# Patient Record
Sex: FEMALE | Race: BLACK OR AFRICAN AMERICAN | NOT HISPANIC OR LATINO | Employment: PART TIME | URBAN - METROPOLITAN AREA
[De-identification: names, ages, dates, MRNs, and addresses within clinical notes are randomized per-mention and may not be internally consistent; named-entity substitution may affect disease eponyms.]

---

## 2017-01-02 ENCOUNTER — HOSPITAL ENCOUNTER (EMERGENCY)
Facility: MEDICAL CENTER | Age: 37
End: 2017-01-03
Attending: EMERGENCY MEDICINE

## 2017-01-02 ENCOUNTER — APPOINTMENT (OUTPATIENT)
Dept: RADIOLOGY | Facility: MEDICAL CENTER | Age: 37
End: 2017-01-02
Attending: EMERGENCY MEDICINE

## 2017-01-02 VITALS
SYSTOLIC BLOOD PRESSURE: 147 MMHG | OXYGEN SATURATION: 98 % | WEIGHT: 158.73 LBS | RESPIRATION RATE: 14 BRPM | TEMPERATURE: 98.2 F | HEART RATE: 103 BPM | DIASTOLIC BLOOD PRESSURE: 103 MMHG | BODY MASS INDEX: 31.16 KG/M2 | HEIGHT: 60 IN

## 2017-01-02 DIAGNOSIS — F41.9 ANXIETY: ICD-10-CM

## 2017-01-02 DIAGNOSIS — R10.9 ABDOMINAL PAIN, UNSPECIFIED LOCATION: ICD-10-CM

## 2017-01-02 LAB
ALBUMIN SERPL BCP-MCNC: 4.2 G/DL (ref 3.2–4.9)
ALBUMIN/GLOB SERPL: 1.1 G/DL
ALP SERPL-CCNC: 54 U/L (ref 30–99)
ALT SERPL-CCNC: 13 U/L (ref 2–50)
ANION GAP SERPL CALC-SCNC: 8 MMOL/L (ref 0–11.9)
APPEARANCE UR: CLEAR
AST SERPL-CCNC: 20 U/L (ref 12–45)
BASOPHILS # BLD AUTO: 0.6 % (ref 0–1.8)
BASOPHILS # BLD: 0.06 K/UL (ref 0–0.12)
BILIRUB SERPL-MCNC: 0.2 MG/DL (ref 0.1–1.5)
BUN SERPL-MCNC: 8 MG/DL (ref 8–22)
CALCIUM SERPL-MCNC: 9.9 MG/DL (ref 8.5–10.5)
CHLORIDE SERPL-SCNC: 105 MMOL/L (ref 96–112)
CO2 SERPL-SCNC: 23 MMOL/L (ref 20–33)
COLOR UR AUTO: YELLOW
CREAT SERPL-MCNC: 0.61 MG/DL (ref 0.5–1.4)
EOSINOPHIL # BLD AUTO: 0.08 K/UL (ref 0–0.51)
EOSINOPHIL NFR BLD: 0.8 % (ref 0–6.9)
ERYTHROCYTE [DISTWIDTH] IN BLOOD BY AUTOMATED COUNT: 42.7 FL (ref 35.9–50)
GFR SERPL CREATININE-BSD FRML MDRD: >60 ML/MIN/1.73 M 2
GLOBULIN SER CALC-MCNC: 3.7 G/DL (ref 1.9–3.5)
GLUCOSE SERPL-MCNC: 87 MG/DL (ref 65–99)
GLUCOSE UR QL STRIP.AUTO: NEGATIVE MG/DL
HCG UR QL: NEGATIVE
HCT VFR BLD AUTO: 39.6 % (ref 37–47)
HGB BLD-MCNC: 12.8 G/DL (ref 12–16)
IMM GRANULOCYTES # BLD AUTO: 0.02 K/UL (ref 0–0.11)
IMM GRANULOCYTES NFR BLD AUTO: 0.2 % (ref 0–0.9)
KETONES UR QL STRIP.AUTO: NEGATIVE MG/DL
LEUKOCYTE ESTERASE UR QL STRIP.AUTO: ABNORMAL
LIPASE SERPL-CCNC: 39 U/L (ref 11–82)
LYMPHOCYTES # BLD AUTO: 3.11 K/UL (ref 1–4.8)
LYMPHOCYTES NFR BLD: 30.5 % (ref 22–41)
MCH RBC QN AUTO: 26.7 PG (ref 27–33)
MCHC RBC AUTO-ENTMCNC: 32.3 G/DL (ref 33.6–35)
MCV RBC AUTO: 82.5 FL (ref 81.4–97.8)
MONOCYTES # BLD AUTO: 0.79 K/UL (ref 0–0.85)
MONOCYTES NFR BLD AUTO: 7.8 % (ref 0–13.4)
NEUTROPHILS # BLD AUTO: 6.13 K/UL (ref 2–7.15)
NEUTROPHILS NFR BLD: 60.1 % (ref 44–72)
NITRITE UR QL STRIP.AUTO: NEGATIVE
NRBC # BLD AUTO: 0 K/UL
NRBC BLD AUTO-RTO: 0 /100 WBC
PH UR STRIP.AUTO: 6 [PH]
PLATELET # BLD AUTO: 429 K/UL (ref 164–446)
PMV BLD AUTO: 10.1 FL (ref 9–12.9)
POTASSIUM SERPL-SCNC: 3.6 MMOL/L (ref 3.6–5.5)
PROT SERPL-MCNC: 7.9 G/DL (ref 6–8.2)
PROT UR QL STRIP: NEGATIVE MG/DL
RBC # BLD AUTO: 4.8 M/UL (ref 4.2–5.4)
RBC UR QL AUTO: ABNORMAL
SODIUM SERPL-SCNC: 136 MMOL/L (ref 135–145)
SP GR UR: 1.02
WBC # BLD AUTO: 10.2 K/UL (ref 4.8–10.8)

## 2017-01-02 PROCEDURE — 700117 HCHG RX CONTRAST REV CODE 255: Performed by: EMERGENCY MEDICINE

## 2017-01-02 PROCEDURE — 96375 TX/PRO/DX INJ NEW DRUG ADDON: CPT

## 2017-01-02 PROCEDURE — 85025 COMPLETE CBC W/AUTO DIFF WBC: CPT

## 2017-01-02 PROCEDURE — 700111 HCHG RX REV CODE 636 W/ 250 OVERRIDE (IP): Performed by: EMERGENCY MEDICINE

## 2017-01-02 PROCEDURE — 83690 ASSAY OF LIPASE: CPT

## 2017-01-02 PROCEDURE — 700105 HCHG RX REV CODE 258: Performed by: EMERGENCY MEDICINE

## 2017-01-02 PROCEDURE — 99285 EMERGENCY DEPT VISIT HI MDM: CPT

## 2017-01-02 PROCEDURE — 96374 THER/PROPH/DIAG INJ IV PUSH: CPT

## 2017-01-02 PROCEDURE — 74177 CT ABD & PELVIS W/CONTRAST: CPT

## 2017-01-02 PROCEDURE — 80053 COMPREHEN METABOLIC PANEL: CPT

## 2017-01-02 PROCEDURE — 81002 URINALYSIS NONAUTO W/O SCOPE: CPT

## 2017-01-02 PROCEDURE — 81025 URINE PREGNANCY TEST: CPT

## 2017-01-02 PROCEDURE — 96361 HYDRATE IV INFUSION ADD-ON: CPT

## 2017-01-02 RX ORDER — SODIUM CHLORIDE 9 MG/ML
1000 INJECTION, SOLUTION INTRAVENOUS ONCE
Status: COMPLETED | OUTPATIENT
Start: 2017-01-02 | End: 2017-01-03

## 2017-01-02 RX ORDER — MORPHINE SULFATE 4 MG/ML
4 INJECTION, SOLUTION INTRAMUSCULAR; INTRAVENOUS ONCE
Status: COMPLETED | OUTPATIENT
Start: 2017-01-02 | End: 2017-01-02

## 2017-01-02 RX ORDER — ONDANSETRON 4 MG/1
4 TABLET, ORALLY DISINTEGRATING ORAL EVERY 6 HOURS PRN
Qty: 20 TAB | Refills: 0 | Status: ON HOLD | OUTPATIENT
Start: 2017-01-02 | End: 2019-11-13

## 2017-01-02 RX ORDER — ONDANSETRON 2 MG/ML
4 INJECTION INTRAMUSCULAR; INTRAVENOUS ONCE
Status: COMPLETED | OUTPATIENT
Start: 2017-01-02 | End: 2017-01-02

## 2017-01-02 RX ORDER — MORPHINE SULFATE 4 MG/ML
4 INJECTION, SOLUTION INTRAMUSCULAR; INTRAVENOUS ONCE
Status: COMPLETED | OUTPATIENT
Start: 2017-01-03 | End: 2017-01-02

## 2017-01-02 RX ORDER — ONDANSETRON 2 MG/ML
4 INJECTION INTRAMUSCULAR; INTRAVENOUS ONCE
Status: DISCONTINUED | OUTPATIENT
Start: 2017-01-03 | End: 2017-01-03 | Stop reason: HOSPADM

## 2017-01-02 RX ADMIN — MORPHINE SULFATE 4 MG: 4 INJECTION INTRAVENOUS at 22:25

## 2017-01-02 RX ADMIN — MORPHINE SULFATE 4 MG: 4 INJECTION INTRAVENOUS at 23:50

## 2017-01-02 RX ADMIN — ONDANSETRON 4 MG: 2 INJECTION, SOLUTION INTRAMUSCULAR; INTRAVENOUS at 22:25

## 2017-01-02 RX ADMIN — SODIUM CHLORIDE 1000 ML: 9 INJECTION, SOLUTION INTRAVENOUS at 22:24

## 2017-01-02 RX ADMIN — IOHEXOL 100 ML: 350 INJECTION, SOLUTION INTRAVENOUS at 22:53

## 2017-01-02 ASSESSMENT — PAIN SCALES - GENERAL: PAINLEVEL_OUTOF10: 4

## 2017-01-02 NOTE — ED AVS SNAPSHOT
Caringo Access Code: [email or web address]  Expires: 1/5/2017  9:08 AM    Your email address is not on file at [facility].  Email Addresses are required for you to sign up for Caringo, please contact [phone] to verify your personal information and to provide your email address prior to attempting to register for Caringo.    Mi Gomez  [address]  [place] [address]    Caringo  A secure, online tool to manage your health information     [facility]’s Caringo® is a secure, online tool that connects you to your personalized health information from the privacy of your home -- day or night - making it very easy for you to manage your healthcare. Once the activation process is completed, you can even access your medical information using the Caringo sean, which is available for free in the Apple Sean store or Google Play store.     To learn more about Caringo, visit [email or web address]    There are two levels of access available (as shown below):   My Chart Features  [facility] Primary Care Doctor [facility]  Specialists [facility]  Urgent  Care Non-[facility] Primary Care Doctor   Email your healthcare team securely and privately 24/7 X X X    Manage appointments: schedule your next appointment; view details of past/upcoming appointments X      Request prescription refills. X      View recent personal medical records, including lab and immunizations X X X X   View health record, including health history, allergies, medications X X X X   Read reports about your outpatient visits, procedures, consult and ER notes X X X X   See your discharge summary, which is a recap of your hospital and/or ER visit that includes your diagnosis, lab results, and care plan X X  X     How to register for [email or web address]:  Once your e-mail address has been verified, follow the following steps to sign up for [email or web address].     1. Go to  [email or web address]  2. Click on the Sign Up Now box, which takes you to the New Member Sign Up  page. You will need to provide the following information:  a. Enter your StumbleUpon Access Code exactly as it appears at the top of this page. (You will not need to use this code after you’ve completed the sign-up process. If you do not sign up before the expiration date, you must request a new code.)   b. Enter your date of birth.   c. Enter your home email address.   d. Click Submit, and follow the next screen’s instructions.  3. Create a StumbleUpon ID. This will be your StumbleUpon login ID and cannot be changed, so think of one that is secure and easy to remember.  4. Create a StumbleUpon password. You can change your password at any time.  5. Enter your Password Reset Question and Answer. This can be used at a later time if you forget your password.   6. Enter your e-mail address. This allows you to receive e-mail notifications when new information is available in StumbleUpon.  7. Click Sign Up. You can now view your health information.    For assistance activating your StumbleUpon account, call [phone]

## 2017-01-02 NOTE — ED AVS SNAPSHOT
1/3/2017          Mi Gomez  [address]  [place] [address]    Dear Mi:    [facility] wants to ensure your discharge home is safe and you or your loved ones have had all your questions answered regarding your care after you leave the hospital.    You may receive a telephone call within two days of your discharge.  This call is to make certain you understand your discharge instructions as well as ensure we provided you with the best care possible during your stay with us.     The call will only last approximately 3-5 minutes and will be done by a nurse.    Once again, we want to ensure your discharge home is safe and that you have a clear understanding of any next steps in your care.  If you have any questions or concerns, please do not hesitate to contact us, we are here for you.  Thank you for choosing [facility] for your healthcare needs.    Sincerely,    Misael Mcpherson    [facility]

## 2017-01-02 NOTE — ED AVS SNAPSHOT
After Visit Summary                                                                                                                Mi Gomez   MRN: [id number]    Department:  [facility], Emergency Dept   Date of Visit:  1/2/2017            [facility], Emergency Dept    [address] [address]    Phone:  [phone]      You were seen by     Jean Claude Levine M.D.      Your Diagnosis Was     Abdominal pain, unspecified location     R10.9       These are the medications you received during your hospitalization from 01/02/2017 1542 to 01/03/2017 0034     Date/Time Order Dose Route Action    01/02/2017 2224 NS infusion 1,000 mL 1,000 mL Intravenous New Bag    01/02/2017 2225 morphine (pf) 4 mg/ml injection 4 mg 4 mg Intravenous Given    01/02/2017 2225 ondansetron (ZOFRAN) syringe/vial injection 4 mg 4 mg Intravenous Given    01/02/2017 2253 iohexol (OMNIPAQUE) 350 mg/mL 100 mL Intravenous Given    01/02/2017 2350 morphine (pf) 4 mg/ml injection 4 mg 4 mg Intravenous Given      Follow-up Information     1. Follow up with Raegan Wilcox M.D. In 2 days.    Specialty:  Internal Medicine    Contact information    [address]  [address] [address] [phone]        Medication Information     Review all of your home medications and newly ordered medications with your primary doctor and/or pharmacist as soon as possible. Follow medication instructions as directed by your doctor and/or pharmacist.     Please keep your complete medication list with you and share with your physician. Update the information when medications are discontinued, doses are changed, or new medications (including over-the-counter products) are added; and carry medication information at all times in the event of emergency situations.               Medication List      ASK your doctor about these medications        Instructions    aripiprazole 5 MG tablet   Commonly known  as:  ABILIFY    Take 1 Tab by mouth every day.   Dose:  5 mg       budesonide 3 MG Cpep capsule   Start taking on:  1/7/2017   Commonly known as:  ENTOCROT EC    Doctor's comments:  Start AFTER completion of prednisone taper.   Take 2 Caps by mouth every morning.   Dose:  6 mg       gabapentin 300 MG Caps   Commonly known as:  NEURONTIN    Take 1 Cap by mouth every day.   Dose:  300 mg       HUMIRA PEN 40 MG/0.8ML Pnkt   Generic drug:  Adalimumab        mesalamine extended-release 500 MG capsule   Commonly known as:  PENTASA    Take 1 Cap by mouth 4 times a day.   Dose:  500 mg       mirtazapine 15 MG Tabs   Commonly known as:  REMERON    Take 0.5 Tabs by mouth every bedtime.   Dose:  7.5 mg       * ondansetron 4 MG Tbdp   What changed:  Another medication with the same name was added. Make sure you understand how and when to take each.   Commonly known as:  ZOFRAN ODT   Ask about: Which instructions should I use?    Take 1 Tab by mouth 2 times a day as needed for Nausea/Vomiting.   Dose:  4 mg       * ondansetron 4 MG Tbdp   What changed:  You were already taking a medication with the same name, and this prescription was added. Make sure you understand how and when to take each.   Commonly known as:  ZOFRAN ODT   Ask about: Which instructions should I use?    Take 1 Tab by mouth every 6 hours as needed for Nausea/Vomiting.   Dose:  4 mg       oxycodone-acetaminophen 5-325 MG Tabs   Commonly known as:  PERCOCET    Take 1 Tab by mouth 3 times a day as needed.   Dose:  1 Tab       predniSONE 10 MG Tabs   Commonly known as:  DELTASONE    Take 4 tabs daily for one week (12/2 - 12/9)  Then take 3 tabs daily for one week (12/10 - 12/16)  Then take 2 tabs daily for one week (12/17 - 12/23)  Then take 1 tab daily for one week (12/24 - 12/30)  Then take 0.5 tabs daily for one week (12/31 - 1/6 Follow this until seen by your GI doctor       sertraline 50 MG Tabs   Commonly known as:  ZOLOFT    Take 1.5 Tabs by mouth every  day.   Dose:  75 mg       * Notice:  This list has 2 medication(s) that are the same as other medications prescribed for you. Read the directions carefully, and ask your doctor or other care provider to review them with you.            Procedures and tests performed during your visit     CBC WITH DIFFERENTIAL    COMP METABOLIC PANEL    CONSENT FOR CONTRAST INJECTION    CT-ABDOMEN-PELVIS WITH    ESTIMATED GFR    LIPASE    POC UA    POC URINE PREGNANCY    SALINE LOCK        Discharge Instructions       Abdominal Pain (Nonspecific)  Your exam might not show the exact reason you have abdominal pain. Since there are many different causes of abdominal pain, another checkup and more tests may be needed. It is very important to follow up for lasting (persistent) or worsening symptoms. A possible cause of abdominal pain in any person who still has his or her appendix is acute appendicitis. Appendicitis is often hard to diagnose. Normal blood tests, urine tests, ultrasound, and CT scans do not completely rule out early appendicitis or other causes of abdominal pain. Sometimes, only the changes that happen over time will allow appendicitis and other causes of abdominal pain to be determined. Other potential problems that may require surgery may also take time to become more apparent. Because of this, it is important that you follow all of the instructions below.  HOME CARE INSTRUCTIONS   · Rest as much as possible.   · Do not eat solid food until your pain is gone.   · While adults or children have pain: A diet of water, weak decaffeinated tea, broth or bouillon, gelatin, oral rehydration solutions (ORS), frozen ice pops, or ice chips may be helpful.   · When pain is gone in adults or children: Start a light diet (dry toast, crackers, applesauce, or white rice). Increase the diet slowly as long as it does not bother you. Eat no dairy products (including cheese and eggs) and no spicy, fatty, fried, or high-fiber foods.   · Use  no alcohol, caffeine, or cigarettes.   · Take your regular medicines unless your caregiver told you not to.   · Take any prescribed medicine as directed.   · Only take over-the-counter or prescription medicines for pain, discomfort, or fever as directed by your caregiver. Do not give aspirin to children.   If your caregiver has given you a follow-up appointment, it is very important to keep that appointment. Not keeping the appointment could result in a permanent injury and/or lasting (chronic) pain and/or disability. If there is any problem keeping the appointment, you must call to reschedule.   SEEK IMMEDIATE MEDICAL CARE IF:   · Your pain is not gone in 24 hours.   · Your pain becomes worse, changes location, or feels different.   · You or your child has an oral temperature above 102° F (38.9° C), not controlled by medicine.   · Your baby is older than 3 months with a rectal temperature of 102° F (38.9° C) or higher.   · Your baby is 3 months old or younger with a rectal temperature of 100.4° F (38° C) or higher.   · You have shaking chills.   · You keep throwing up (vomiting) or cannot drink liquids.   · There is blood in your vomit or you see blood in your bowel movements.   · Your bowel movements become dark or black.   · You have frequent bowel movements.   · Your bowel movements stop (become blocked) or you cannot pass gas.   · You have bloody, frequent, or painful urination.   · You have yellow discoloration in the skin or whites of the eyes.   · Your stomach becomes bloated or bigger.   · You have dizziness or fainting.   · You have chest or back pain.   MAKE SURE YOU:   · Understand these instructions.   · Will watch your condition.   · Will get help right away if you are not doing well or get worse.   Document Released: 12/18/2006 Document Revised: 03/11/2013 Document Reviewed: 11/15/2010  ExitCare® Patient Information ©2013 [employer].          Patient Information     Patient Information    Following  emergency treatment: all patient requiring follow-up care must return either to a private physician or a clinic if your condition worsens before you are able to obtain further medical attention, please return to the emergency room.     Billing Information    [facility] we work to make the billing process streamlined for our patients.  Our Representatives are here to answer any questions you may have regarding your hospital bill.  If you have insurance coverage and have supplied your insurance information to us, we will submit a claim to your insurer on your behalf.  Should you have any questions regarding your bill, we can be reached online or by phone as follows:  Online: You are able pay your bills online or live chat with our representatives about any billing questions you may have. We are here to help Monday - Friday from 8:00am to 7:30pm and 9:00am - 12:00pm on Saturdays.  Please visit [email or web address]  for more information.   Phone:  [phone] or [phone]    Please note that your emergency physician, surgeon, pathologist, radiologist, anesthesiologist, and other specialists are not employed by [facility] and will therefore bill separately for their services.  Please contact them directly for any questions concerning their bills at the numbers below:     Emergency Physician Services:  [phone]  [facility]:  [phone]  [facility]:  [phone]  [facility]:  [phone]    1. Your final bill may vary from the amount quoted upon discharge if all procedures are not complete at that time, or if your doctor has additional procedures of which we are not aware. You will receive an additional bill if you return to the [facility] Department at [facility] for suture removal regardless of the facility of which the sutures were placed.     2. Please arrange for settlement of this account at the emergency  registration.    3. All self-pay accounts are due in full at the time of treatment.  If you are unable to meet this obligation then payment is expected within 4-5 days.     4. If you have had radiology studies (CT, X-ray, Ultrasound, MRI), you have received a preliminary result during your emergency department visit. Please contact the radiology department [phone] to receive a copy of your final result. Please discuss the Final result with your primary physician or with the follow up physician provided.     Crisis Hotline:  [place] Crisis Hotline:  8-344-VRMBULN or 1-471.106.6165  [place] Crisis Hotline:    1-340.646.6312 or 914-390-5526         ED Discharge Follow Up Questions    1. In order to provide you with very good care, we would like to follow up with a phone call in the next few days.  May we have your permission to contact you?     YES /  NO    2. What is the best phone number to call you? (       )_____-__________    3. What is the best time to call you?      Morning  /  Afternoon  /  Evening                   Patient Signature:  ____________________________________________________________    Date:  ____________________________________________________________      Your appointments     Jan 19, 2017  3:00 PM   Individual Therapy with YUE Kee   [facility]    [address]  [address]  [facility] [id number] [phone]            Jan 23, 2017  9:45 AM   Follow Up Med Management with BONNIE Morris   [facility]    [address]  [address]  [facility] [id number] [phone]

## 2017-01-03 NOTE — ED NOTES
Mi Calderónmarilynndariela discharged via ambulation with self, mother picking up in lobby.  Discharge instructions given and reviewed, patient educated to follow up with PCP, verbalized understanding.  Prescriptions given x 1.  All personal belongings in possession.  No questions at this time.

## 2017-01-03 NOTE — ED PROVIDER NOTES
ER PROVIDER NOTE    Scribed for Jean Claude Levine M.D.  by Elian Morris. 1/2/2017 at 8:10 PM.    Primary Care Provider: Raegan Wilcox M.D.  Means of Arrival: Walk-in   History obtained from: Patient   History limited by: None     CHIEF COMPLAINT  Chief Complaint   Patient presents with   • Crohn's Disease   • Nausea/Vomiting/Diarrhea   • Abdominal Pain       HPI  Mi Gomez is a 36 y.o. female who presents to the emergency department complaining of nausea, vomiting, diarrhea, and abdominal pain. Patient reports she has a history of Crohn's disease and has not been taking her Humira for two months due to insurance issues. She states she developed abdominal pain 12/29/16 and then developed diarrhea two days ago. Patient began vomiting last night. She states she is unsure of any hematochezia. Patient says her symptoms are consistent with a flare-up of Crohn's disease. She denies fever, chills, rash, chest pain, or other symptoms. No blood in her stool    REVIEW OF SYSTEMS  Pertinent positives include nausea, vomiting, diarrhea, abdominal pain. Pertinent negatives include no fever, chills, rash, chest pain, hematochezia (unsure). See HPI for details. All other systems reviewed and are negative.    PAST MEDICAL HISTORY   has a past medical history of PNA (pneumonia); IBD (inflammatory bowel disease); Depression; Anxiety; and Hepatitis C.    SURGICAL HISTORY   has past surgical history that includes colonoscopy with biopsy (N/A, 6/21/2016).    FAMILY HISTORY  Family History   Problem Relation Age of Onset   • Thyroid Mother    • Diabetes Maternal Grandmother    • Bipolar disorder Father      father possibly bipolar       SOCIAL HISTORY  Social History     Social History   • Marital Status: Single     Spouse Name: N/A   • Number of Children: N/A   • Years of Education: N/A     Social History Main Topics   • Smoking status: Never Smoker    • Smokeless tobacco: Not on file   • Alcohol Use: No       Comment: last use 7/17/2016; was daily   • Drug Use: No   • Sexual Activity: No     Other Topics Concern   • Not on file     Social History Narrative      History   Drug Use No       CURRENT MEDICATIONS  Reviewed.  See Encounter Summary.     ALLERGIES  Allergies   Allergen Reactions   • Phenergan [Promethazine]      Anxiety     • Seroquel [Quetiapine] Itching   • Trazodone Itching       PHYSICAL EXAM  VITAL SIGNS: /103 mmHg  Pulse 103  Temp(Src) 36.8 °C (98.2 °F) (Temporal)  Resp 14  Ht 1.524 m (5')  Wt 72 kg (158 lb 11.7 oz)  BMI 31.00 kg/m2  SpO2 98%  Pulse ox interpretation: I interpret this pulse ox as normal.    Constitutional: Alert in no apparent distress.  HENT: No signs of trauma, Bilateral external ears normal, Nose normal.   Eyes: Pupils are equal and reactive, Conjunctiva normal, Non-icteric.   Neck: Normal range of motion, No tenderness, Supple, No stridor.   Cardiovascular: Regular rate and rhythm, no murmurs.   Thorax & Lungs: Normal breath sounds, No respiratory distress, No wheezing, No chest tenderness.   Abdomen: mild Left lower quadrant tenderness. Bowel sounds normal, Soft, No masses, No pulsatile masses. No peritoneal signs.  Skin: Warm, Dry, No erythema, No rash.   Back: No bony tenderness, No CVA tenderness.   Extremities: Intact distal pulses, No edema, No tenderness, No cyanosis  Musculoskeletal: Good range of motion in all major joints. No tenderness to palpation or major deformities noted.   Neurologic: Alert , Normal motor function, Normal sensory function, No focal deficits noted.   Psychiatric: Affect normal, Judgment normal, Mood normal.     Filed Vitals:    01/02/17 1543 01/02/17 1624   BP: 147/103    Pulse: 103    Temp: 36.8 °C (98.2 °F)    TempSrc: Temporal    Resp: 14    Height: 1.524 m (5')    Weight:  72 kg (158 lb 11.7 oz)   SpO2: 98%          DIAGNOSTIC STUDIES / PROCEDURES    LABS  Results for orders placed or performed during the hospital encounter of 01/02/17    CBC WITH DIFFERENTIAL   Result Value Ref Range    WBC 10.2 4.8 - 10.8 K/uL    RBC 4.80 4.20 - 5.40 M/uL    Hemoglobin 12.8 12.0 - 16.0 g/dL    Hematocrit 39.6 37.0 - 47.0 %    MCV 82.5 81.4 - 97.8 fL    MCH 26.7 (L) 27.0 - 33.0 pg    MCHC 32.3 (L) 33.6 - 35.0 g/dL    RDW 42.7 35.9 - 50.0 fL    Platelet Count 429 164 - 446 K/uL    MPV 10.1 9.0 - 12.9 fL    Neutrophils-Polys 60.10 44.00 - 72.00 %    Lymphocytes 30.50 22.00 - 41.00 %    Monocytes 7.80 0.00 - 13.40 %    Eosinophils 0.80 0.00 - 6.90 %    Basophils 0.60 0.00 - 1.80 %    Immature Granulocytes 0.20 0.00 - 0.90 %    Nucleated RBC 0.00 /100 WBC    Neutrophils (Absolute) 6.13 2.00 - 7.15 K/uL    Lymphs (Absolute) 3.11 1.00 - 4.80 K/uL    Monos (Absolute) 0.79 0.00 - 0.85 K/uL    Eos (Absolute) 0.08 0.00 - 0.51 K/uL    Baso (Absolute) 0.06 0.00 - 0.12 K/uL    Immature Granulocytes (abs) 0.02 0.00 - 0.11 K/uL    NRBC (Absolute) 0.00 K/uL   COMP METABOLIC PANEL   Result Value Ref Range    Sodium 136 135 - 145 mmol/L    Potassium 3.6 3.6 - 5.5 mmol/L    Chloride 105 96 - 112 mmol/L    Co2 23 20 - 33 mmol/L    Anion Gap 8.0 0.0 - 11.9    Glucose 87 65 - 99 mg/dL    Bun 8 8 - 22 mg/dL    Creatinine 0.61 0.50 - 1.40 mg/dL    Calcium 9.9 8.5 - 10.5 mg/dL    AST(SGOT) 20 12 - 45 U/L    ALT(SGPT) 13 2 - 50 U/L    Alkaline Phosphatase 54 30 - 99 U/L    Total Bilirubin 0.2 0.1 - 1.5 mg/dL    Albumin 4.2 3.2 - 4.9 g/dL    Total Protein 7.9 6.0 - 8.2 g/dL    Globulin 3.7 (H) 1.9 - 3.5 g/dL    A-G Ratio 1.1 g/dL   LIPASE   Result Value Ref Range    Lipase 39 11 - 82 U/L   ESTIMATED GFR   Result Value Ref Range    GFR If African American >60 >60 mL/min/1.73 m 2    GFR If Non African American >60 >60 mL/min/1.73 m 2   POC UA   Result Value Ref Range    POC Color Yellow     POC Appearance Clear     POC Glucose Negative Negative mg/dL    POC Ketones Negative Negative mg/dL    POC Specific Gravity 1.020 1.005-1.030    POC Blood Moderate (A) Negative    POC Urine PH 6.0 5.0-8.0     POC Protein Negative Negative mg/dL    POC Nitrites Negative Negative    POC Leukocyte Esterase Large (A) Negative   POC URINE PREGNANCY   Result Value Ref Range    POC Urine Pregnancy Test Negative Negative      All labs reviewed by me.    RADIOLOGY  CT-ABDOMEN-PELVIS WITH   Final Result      1.  No acute intra-abdominal findings.      2.  Patchy right middle and lower lobe opacities likely represent atelectasis.        The radiologist's interpretation of all radiological studies have been reviewed by me.    COURSE & MEDICAL DECISION MAKING  Nursing notes, VS, PMSFHx reviewed in chart.    8:10 PM Patient seen and examined at bedside. I explained to the patient I will order labs and a CT scan of her abdomen to evaluate, and that her symptoms will be treated. Patient will be treated with Morphine 4 mg IV and Zofran 4 mg IV. The patient will be resuscitated with 1L NS IV. Ordered for CT-abdomen, eGFR, POC urine pregnancy, CBC, CMP, lipase, POC urinalysis to evaluate her symptoms.     9:02 PM Recheck: Patient is resting comfortably. Nurses are establishing the patient's IV.     11:47 PM Recheck: Patient is resting comfortably and reports feeling improved, abdomen is soft and nontender. I updated her on her results, which are outlined above. I explained that she is now stable for discharge with a prescription for Zofran to control her symptoms, but that she will need to follow up with her regular doctors for her medication for her Crohn's. She was made aware she will be discharged when her bag of fluids finishes. I advised her to return to the ED for worsening symptoms or any other medical concerns. She understands and will comply.      Decision Making:  This is a 36 y.o. female with history of Crohn's disease presenting with some abdominal pain. Likely a flare of her Crohn's, she actually has follow-up scheduled this week, will provide her with Zofran for symptom control as she has pain medications at home. CT  demonstrates no abscess, perforation, obstruction or other surgical intra-abdominal finding. She is overall quite well-appearing, with appropriate vital signs and no evidence of infectious process    The patient will return for new or worsening symptoms and is stable at the time of discharge.    The patient is referred to a primary physician for blood pressure management, diabetic screening, and for all other preventative health concerns.    DISPOSITION:  Patient will be discharged home in stable condition.    FOLLOW UP:  Raegan Wilcox M.D.  [address] [address]  [phone]    In 2 days        OUTPATIENT MEDICATIONS:  Discharge Medication List as of 1/3/2017 12:34 AM      START taking these medications    Details   !! ondansetron (ZOFRAN ODT) 4 MG TABLET DISPERSIBLE Take 1 Tab by mouth every 6 hours as needed for Nausea/Vomiting., Disp-20 Tab, R-0, Print Rx Paper       !! - Potential duplicate medications found. Please discuss with provider.          FINAL IMPRESSION  1. Abdominal pain, unspecified location    2. Anxiety         Elian BROWN ([employer]), am scribing for, and in the presence of, Jean Claude Levine M.D..    Electronically signed by: Elian Morris ([employer]), 1/2/2017    Jean Claude BROWN M.D. personally performed the services described in this documentation, as scribed by Elian Morris in my presence, and it is both accurate and complete.     The note accurately reflects work and decisions made by me.  Jean Claude Levine  1/3/2017  1:52 AM

## 2017-01-03 NOTE — DISCHARGE INSTRUCTIONS
Abdominal Pain (Nonspecific)  Your exam might not show the exact reason you have abdominal pain. Since there are many different causes of abdominal pain, another checkup and more tests may be needed. It is very important to follow up for lasting (persistent) or worsening symptoms. A possible cause of abdominal pain in any person who still has his or her appendix is acute appendicitis. Appendicitis is often hard to diagnose. Normal blood tests, urine tests, ultrasound, and CT scans do not completely rule out early appendicitis or other causes of abdominal pain. Sometimes, only the changes that happen over time will allow appendicitis and other causes of abdominal pain to be determined. Other potential problems that may require surgery may also take time to become more apparent. Because of this, it is important that you follow all of the instructions below.  HOME CARE INSTRUCTIONS   · Rest as much as possible.   · Do not eat solid food until your pain is gone.   · While adults or children have pain: A diet of water, weak decaffeinated tea, broth or bouillon, gelatin, oral rehydration solutions (ORS), frozen ice pops, or ice chips may be helpful.   · When pain is gone in adults or children: Start a light diet (dry toast, crackers, applesauce, or white rice). Increase the diet slowly as long as it does not bother you. Eat no dairy products (including cheese and eggs) and no spicy, fatty, fried, or high-fiber foods.   · Use no alcohol, caffeine, or cigarettes.   · Take your regular medicines unless your caregiver told you not to.   · Take any prescribed medicine as directed.   · Only take over-the-counter or prescription medicines for pain, discomfort, or fever as directed by your caregiver. Do not give aspirin to children.   If your caregiver has given you a follow-up appointment, it is very important to keep that appointment. Not keeping the appointment could result in a permanent injury and/or lasting (chronic) pain  and/or disability. If there is any problem keeping the appointment, you must call to reschedule.   SEEK IMMEDIATE MEDICAL CARE IF:   · Your pain is not gone in 24 hours.   · Your pain becomes worse, changes location, or feels different.   · You or your child has an oral temperature above 102° F (38.9° C), not controlled by medicine.   · Your baby is older than 3 months with a rectal temperature of 102° F (38.9° C) or higher.   · Your baby is 3 months old or younger with a rectal temperature of 100.4° F (38° C) or higher.   · You have shaking chills.   · You keep throwing up (vomiting) or cannot drink liquids.   · There is blood in your vomit or you see blood in your bowel movements.   · Your bowel movements become dark or black.   · You have frequent bowel movements.   · Your bowel movements stop (become blocked) or you cannot pass gas.   · You have bloody, frequent, or painful urination.   · You have yellow discoloration in the skin or whites of the eyes.   · Your stomach becomes bloated or bigger.   · You have dizziness or fainting.   · You have chest or back pain.   MAKE SURE YOU:   · Understand these instructions.   · Will watch your condition.   · Will get help right away if you are not doing well or get worse.   Document Released: 12/18/2006 Document Revised: 03/11/2013 Document Reviewed: 11/15/2010  [employer]® Patient Information ©2013 [employer].

## 2017-01-03 NOTE — ED NOTES
Pt has HX of crohns. Has not had HUmira in 2 months.  C?O N/V/D and abdominal pain which started on 12/29/16 and has worsened since then. Patient says these symptoms are consistent with a Crohns flare up.

## 2017-01-16 ENCOUNTER — HOSPITAL ENCOUNTER (EMERGENCY)
Facility: MEDICAL CENTER | Age: 37
End: 2017-01-17
Attending: EMERGENCY MEDICINE

## 2017-01-16 DIAGNOSIS — K50.90 CROHN'S DISEASE WITHOUT COMPLICATION, UNSPECIFIED GASTROINTESTINAL TRACT LOCATION (HCC): ICD-10-CM

## 2017-01-16 DIAGNOSIS — R10.30 LOWER ABDOMINAL PAIN: Primary | ICD-10-CM

## 2017-01-16 LAB
BASOPHILS # BLD AUTO: 0.4 % (ref 0–1.8)
BASOPHILS # BLD: 0.06 K/UL (ref 0–0.12)
EOSINOPHIL # BLD AUTO: 0.21 K/UL (ref 0–0.51)
EOSINOPHIL NFR BLD: 1.4 % (ref 0–6.9)
ERYTHROCYTE [DISTWIDTH] IN BLOOD BY AUTOMATED COUNT: 43.9 FL (ref 35.9–50)
HCT VFR BLD AUTO: 40.3 % (ref 37–47)
HGB BLD-MCNC: 13.4 G/DL (ref 12–16)
IMM GRANULOCYTES # BLD AUTO: 0.1 K/UL (ref 0–0.11)
IMM GRANULOCYTES NFR BLD AUTO: 0.7 % (ref 0–0.9)
LYMPHOCYTES # BLD AUTO: 3.8 K/UL (ref 1–4.8)
LYMPHOCYTES NFR BLD: 25.1 % (ref 22–41)
MCH RBC QN AUTO: 27.5 PG (ref 27–33)
MCHC RBC AUTO-ENTMCNC: 33.3 G/DL (ref 33.6–35)
MCV RBC AUTO: 82.8 FL (ref 81.4–97.8)
MONOCYTES # BLD AUTO: 1.14 K/UL (ref 0–0.85)
MONOCYTES NFR BLD AUTO: 7.5 % (ref 0–13.4)
NEUTROPHILS # BLD AUTO: 9.8 K/UL (ref 2–7.15)
NEUTROPHILS NFR BLD: 64.9 % (ref 44–72)
NRBC # BLD AUTO: 0 K/UL
NRBC BLD AUTO-RTO: 0 /100 WBC
PLATELET # BLD AUTO: 383 K/UL (ref 164–446)
PMV BLD AUTO: 11.4 FL (ref 9–12.9)
RBC # BLD AUTO: 4.87 M/UL (ref 4.2–5.4)
WBC # BLD AUTO: 15.1 K/UL (ref 4.8–10.8)

## 2017-01-16 PROCEDURE — 80053 COMPREHEN METABOLIC PANEL: CPT

## 2017-01-16 PROCEDURE — 83690 ASSAY OF LIPASE: CPT

## 2017-01-16 PROCEDURE — 85025 COMPLETE CBC W/AUTO DIFF WBC: CPT

## 2017-01-16 PROCEDURE — 99285 EMERGENCY DEPT VISIT HI MDM: CPT

## 2017-01-16 RX ORDER — SODIUM CHLORIDE 9 MG/ML
1000 INJECTION, SOLUTION INTRAVENOUS ONCE
Status: COMPLETED | OUTPATIENT
Start: 2017-01-17 | End: 2017-01-17

## 2017-01-16 RX ORDER — METOCLOPRAMIDE HYDROCHLORIDE 5 MG/ML
10 INJECTION INTRAMUSCULAR; INTRAVENOUS ONCE
Status: COMPLETED | OUTPATIENT
Start: 2017-01-17 | End: 2017-01-17

## 2017-01-16 ASSESSMENT — PAIN SCALES - GENERAL: PAINLEVEL_OUTOF10: 4

## 2017-01-16 NOTE — ED AVS SNAPSHOT
After Visit Summary                                                                                                                Mi Gomez   MRN: [id number]    Department:  [facility], Emergency Dept   Date of Visit:  1/16/2017            [facility], Emergency Dept    [address]    [place] [address]    Phone:  [phone]      You were seen by     Jessica Reveles D.O.      Your Diagnosis Was     Lower abdominal pain     R10.30       These are the medications you received during your hospitalization from 01/16/2017 1543 to 01/17/2017 0206     Date/Time Order Dose Route Action    01/17/2017 0003 NS infusion 1,000 mL 1,000 mL Intravenous New Bag    01/17/2017 0006 HYDROmorphone (DILAUDID) injection 1 mg 1 mg Intravenous Given    01/17/2017 0003 metoclopramide (REGLAN) injection 10 mg 10 mg Intravenous Given      Follow-up Information     1. Follow up with Raegan Wilcox M.D. In 1 day.    Specialty:  Internal Medicine    Contact information    [address]  [facility] [address]  [place] [address] [phone]          2. Follow up with Jessica Guzmán M.D. In 1 day.    Specialty:  Gastroenterology    Contact information    [address]  [place] [address] [phone]        Medication Information     Review all of your home medications and newly ordered medications with your primary doctor and/or pharmacist as soon as possible. Follow medication instructions as directed by your doctor and/or pharmacist.     Please keep your complete medication list with you and share with your physician. Update the information when medications are discontinued, doses are changed, or new medications (including over-the-counter products) are added; and carry medication information at all times in the event of emergency situations.               Medication List      START taking these medications        Instructions    metoclopramide 10 MG Tabs   Commonly known as:  REGLAN      Take 1 Tab by mouth every 6 hours as needed.   Dose:  10 mg         ASK your doctor about these medications        Instructions    aripiprazole 5 MG tablet   Commonly known as:  ABILIFY    Take 1 Tab by mouth every day.   Dose:  5 mg       budesonide 3 MG Cpep capsule   Commonly known as:  ENTOCROT EC    Doctor's comments:  Start AFTER completion of prednisone taper.   Take 2 Caps by mouth every morning.   Dose:  6 mg       gabapentin 300 MG Caps   Commonly known as:  NEURONTIN    Take 1 Cap by mouth every day.   Dose:  300 mg       HUMIRA PEN 40 MG/0.8ML Pnkt   Generic drug:  Adalimumab        mesalamine extended-release 500 MG capsule   Commonly known as:  PENTASA    Take 1 Cap by mouth 4 times a day.   Dose:  500 mg       mirtazapine 15 MG Tabs   Commonly known as:  REMERON    Take 0.5 Tabs by mouth every bedtime.   Dose:  7.5 mg       * ondansetron 4 MG Tbdp   Commonly known as:  ZOFRAN ODT    Take 1 Tab by mouth 2 times a day as needed for Nausea/Vomiting.   Dose:  4 mg       * ondansetron 4 MG Tbdp   Commonly known as:  ZOFRAN ODT    Take 1 Tab by mouth every 6 hours as needed for Nausea/Vomiting.   Dose:  4 mg       oxycodone-acetaminophen 5-325 MG Tabs   Commonly known as:  PERCOCET    Take 1 Tab by mouth 3 times a day as needed.   Dose:  1 Tab       sertraline 50 MG Tabs   Commonly known as:  ZOLOFT    Take 1.5 Tabs by mouth every day.   Dose:  75 mg       * Notice:  This list has 2 medication(s) that are the same as other medications prescribed for you. Read the directions carefully, and ask your doctor or other care provider to review them with you.            Procedures and tests performed during your visit     CBC WITH DIFFERENTIAL    COMP METABOLIC PANEL    Cardiac Monitoring    ESTIMATED GFR    IV Saline Lock    LIPASE    POC UA    POC URINE PREGNANCY    Pulse Ox        Discharge Instructions       Follow up with primary care and GI this week for reevaluation for close blood pressure monitoring and  medication management.    Continue home medications as previously indicated.  Zofran reportedly ineffective, try Reglan every 6 hours as needed for nausea or vomiting.  Encourage oral fluid hydration, clear liquid diet to advance as tolerated in 1-2 days.    Return to the emergency department for persistent worsening abdominal pain, rectal bleeding, fever, vomiting or other new concerns.    Crohn Disease  Crohn disease is a long-lasting (chronic) disease that affects your gastrointestinal (GI) tract. It often causes irritation and swelling (inflammation) in your small intestine and the beginning of your large intestine. However, it can affect any part of your GI tract. Crohn disease is part of a group of illnesses that are known as inflammatory bowel disease (IBD).  Crohn disease may start slowly and get worse over time. Symptoms may come and go. They may also disappear for months or even years at a time (remission).  CAUSES  The exact cause of Crohn disease is not known. It may be a response that causes your body's defense system (immune system) to mistakenly attack healthy cells and tissues (autoimmune response). Your genes and your environment may also play a role.  RISK FACTORS  You may be at greater risk for Crohn disease if you:  · Have other family members with Crohn disease or another IBD.  · Use any tobacco products, including cigarettes, chewing tobacco, or electronic cigarettes.  · Are in your 20s.  · Have Eastern  ancestry.  SIGNS AND SYMPTOMS  The main signs and symptoms of Crohn disease involve your GI tract. These include:  · Diarrhea.  · Rectal bleeding.  · An urgent need to move your bowels.  · The feeling that you are not finished having a bowel movement.  · Abdominal pain or cramping.  · Constipation.  General signs and symptoms of Crohn disease may also include:  · Unexplained weight loss.  · Fatigue.  · Fever.  · Nausea.  · Loss of appetite.  · Joint pain  · Changes in vision.  · Red  bumps on your skin.  DIAGNOSIS  Your health care provider may suspect Crohn disease based on your symptoms and your medical history. Your health care provider will do a physical exam. You may need to see a health care provider who specializes in diseases of the digestive tract (gastroenterologist). You may also have tests to help your health care providers make a diagnosis. These may include:  · Blood tests.  · Stool sample tests.  · Imaging tests, such as X-rays and CT scans.  · Tests to examine the inside of your intestines using a long, flexible tube that has a light and a camera on the end (endoscopy or colonoscopy).  · A procedure to take tissue samples from inside your bowel (biopsy) to be examined under a microscope.  TREATMENT   There is no cure for Crohn disease. Treatment will focus on managing your symptoms. Crohn disease affects each person differently. Your treatment may include:  · Resting your bowels. Drinking only clear liquids or getting nutrition through an IV for a period of time gives your bowels a chance to heal because they are not passing stools.  · Medicines. These may be used alone or in combination (combination therapy). These may include antibiotic medicines. You may be given medicines that help to:  ¨ Reduce inflammation.  ¨ Control your immune system activity.  ¨ Fight infections.  ¨ Relieve cramps and prevent diarrhea.  ¨ Control your pain.  · Surgery. You may need surgery if:  ¨ Medicines and other treatments are no longer working.  ¨ You develop complications from severe Crohn disease.  ¨ A section of your intestine becomes so damaged that it needs to be removed.  HOME CARE INSTRUCTIONS  · Take medicines only as directed by your health care provider.  · If you were prescribed an antibiotic medicine, finish it all even if you start to feel better.  · Keep all follow-up visits as directed by your health care provider. This is important.  · Talk with your health care provider about  changing your diet. This may help your symptoms. Your health care provide may recommend changes, such as:  ¨ Drinking more fluids.  ¨ Avoiding milk and other foods that contain lactose.  ¨ Eating a low-fat diet.  ¨ Avoiding high-fiber foods, such as popcorn and nuts.  ¨ Avoiding carbonated beverages, such as soda.  ¨ Eating smaller meals more often rather than eating large meals.  ¨ Keeping a food diary to identify foods that make your symptoms better or worse.  · Do not use any tobacco products, including cigarettes, chewing tobacco, or electronic cigarettes. If you need help quitting, ask your health care provider.  · Limit alcohol intake to no more than 1 drink per day for nonpregnant women and 2 drinks per day for men. One drink equals 12 ounces of beer, 5 ounces of wine, or 1½ ounces of hard liquor.  · Exercise daily or as directed by your health care provider.  SEEK MEDICAL CARE IF:  · You have diarrhea, abdominal cramps, and other gastrointestinal problems that are present almost all of the time.  · Your symptoms do not improve with treatment.  · You continue to lose weight.  · You develop a rash or sores on your skin.  · You develop eye problems.  · You have a fever.    · Your symptoms get worse.  · You develop new symptoms.  SEEK IMMEDIATE MEDICAL CARE IF:  · You have bloody diarrhea.  · You develop severe abdominal pain.  · You cannot pass stools.     This information is not intended to replace advice given to you by your health care provider. Make sure you discuss any questions you have with your health care provider.     Document Released: 09/27/2006 Document Revised: 01/08/2016 Document Reviewed: 08/05/2015  Elsevier Interactive Patient Education ©2016 Elsevier Inc.            Patient Information     Patient Information    Following emergency treatment: all patient requiring follow-up care must return either to a private physician or a clinic if your condition worsens before you are able to obtain  further medical attention, please return to the emergency room.     Billing Information    At [facility], we work to make the billing process streamlined for our patients.  Our Representatives are here to answer any questions you may have regarding your hospital bill.  If you have insurance coverage and have supplied your insurance information to us, we will submit a claim to your insurer on your behalf.  Should you have any questions regarding your bill, we can be reached online or by phone as follows:  Online: You are able pay your bills online or live chat with our representatives about any billing questions you may have. We are here to help Monday - Friday from 8:00am to 7:30pm and 9:00am - 12:00pm on Saturdays.  Please visit [email or web address]  for more information.   Phone:  [phone] or [phone]    Please note that your emergency physician, surgeon, pathologist, radiologist, anesthesiologist, and other specialists are not employed by [facility] and will therefore bill separately for their services.  Please contact them directly for any questions concerning their bills at the numbers below:     Emergency Physician Services:  [phone]  [facility]:  [phone]  [facility]:  [phone]  [facility]:  [phone]    1. Your final bill may vary from the amount quoted upon discharge if all procedures are not complete at that time, or if your doctor has additional procedures of which we are not aware. You will receive an additional bill if you return to the Emergency Department at [facility] for suture removal regardless of the facility of which the sutures were placed.     2. Please arrange for settlement of this account at the emergency registration.    3. All self-pay accounts are due in full at the time of treatment.  If you are unable to meet this obligation then payment is expected within 4-5 days.      4. If you have had radiology studies (CT, X-ray, Ultrasound, MRI), you have received a preliminary result during your emergency department visit. Please contact the radiology department [phone] to receive a copy of your final result. Please discuss the Final result with your primary physician or with the follow up physician provided.     Crisis Hotline:  [place] Crisis Hotline:  3-228-EAXFMNY or 1-338.221.6828  [place] Crisis Hotline:    1-572.939.9250 or 476-925-0603         ED Discharge Follow Up Questions    1. In order to provide you with very good care, we would like to follow up with a phone call in the next few days.  May we have your permission to contact you?     YES /  NO    2. What is the best phone number to call you? (       )_____-__________    3. What is the best time to call you?      Morning  /  Afternoon  /  Evening                   Patient Signature:  ____________________________________________________________    Date:  ____________________________________________________________      Your appointments     Jan 19, 2017  3:00 PM   Individual Therapy with YUE Kee   [facility]    [address]  [address]  [facility] [id number] [phone]            Jan 23, 2017  9:45 AM   Follow Up Med Management with BONNIE Morris   [facility]    [address]  [address]  [facility] [id number] [phone]

## 2017-01-16 NOTE — ED AVS SNAPSHOT
1/17/2017          Mi Gomez  [address]  [place] [address]    Dear Mi:    [facility] wants to ensure your discharge home is safe and you or your loved ones have had all your questions answered regarding your care after you leave the hospital.    You may receive a telephone call within two days of your discharge.  This call is to make certain you understand your discharge instructions as well as ensure we provided you with the best care possible during your stay with us.     The call will only last approximately 3-5 minutes and will be done by a nurse.    Once again, we want to ensure your discharge home is safe and that you have a clear understanding of any next steps in your care.  If you have any questions or concerns, please do not hesitate to contact us, we are here for you.  Thank you for choosing [facility] for your healthcare needs.    Sincerely,    Misael Mcpherson    [facility]

## 2017-01-16 NOTE — ED AVS SNAPSHOT
Altacor Access Code: [email or web address]  Expires: 2/3/2017  9:24 AM    Your email address is not on file at [facility].  Email Addresses are required for you to sign up for Altacor, please contact [phone] to verify your personal information and to provide your email address prior to attempting to register for Altacor.    Mi Rodriguez Jason  [address]  [place] [address]    Altacor  A secure, online tool to manage your health information     [facility]’s Altacor® is a secure, online tool that connects you to your personalized health information from the privacy of your home -- day or night - making it very easy for you to manage your healthcare. Once the activation process is completed, you can even access your medical information using the Altacor sean, which is available for free in the Apple Sean store or Google Play store.     To learn more about Altacor, visit [email or web address]    There are two levels of access available (as shown below):   My Chart Features  [facility] Primary Care Doctor [facility]  Specialists [facility]  Urgent  Care Non-[facility] Primary Care Doctor   Email your healthcare team securely and privately 24/7 X X X    Manage appointments: schedule your next appointment; view details of past/upcoming appointments X      Request prescription refills. X      View recent personal medical records, including lab and immunizations X X X X   View health record, including health history, allergies, medications X X X X   Read reports about your outpatient visits, procedures, consult and ER notes X X X X   See your discharge summary, which is a recap of your hospital and/or ER visit that includes your diagnosis, lab results, and care plan X X  X     How to register for Kelwayt:  Once your e-mail address has been verified, follow the following steps to sign up for Kelwayt.     1. Go to  [email or web address]  2. Click on the Sign Up Now box, which takes you to the New Member Sign Up  page. You will need to provide the following information:  a. Enter your jaeyos Access Code exactly as it appears at the top of this page. (You will not need to use this code after you’ve completed the sign-up process. If you do not sign up before the expiration date, you must request a new code.)   b. Enter your date of birth.   c. Enter your home email address.   d. Click Submit, and follow the next screen’s instructions.  3. Create a jaeyos ID. This will be your jaeyos login ID and cannot be changed, so think of one that is secure and easy to remember.  4. Create a jaeyos password. You can change your password at any time.  5. Enter your Password Reset Question and Answer. This can be used at a later time if you forget your password.   6. Enter your e-mail address. This allows you to receive e-mail notifications when new information is available in jaeyos.  7. Click Sign Up. You can now view your health information.    For assistance activating your jaeyos account, call [phone]

## 2017-01-17 VITALS
WEIGHT: 155.2 LBS | HEART RATE: 80 BPM | BODY MASS INDEX: 30.47 KG/M2 | OXYGEN SATURATION: 99 % | TEMPERATURE: 98.6 F | RESPIRATION RATE: 16 BRPM | HEIGHT: 60 IN | SYSTOLIC BLOOD PRESSURE: 130 MMHG | DIASTOLIC BLOOD PRESSURE: 70 MMHG

## 2017-01-17 LAB
ALBUMIN SERPL BCP-MCNC: 4.6 G/DL (ref 3.2–4.9)
ALBUMIN/GLOB SERPL: 1 G/DL
ALP SERPL-CCNC: 71 U/L (ref 30–99)
ALT SERPL-CCNC: 14 U/L (ref 2–50)
ANION GAP SERPL CALC-SCNC: 8 MMOL/L (ref 0–11.9)
APPEARANCE UR: ABNORMAL
AST SERPL-CCNC: 23 U/L (ref 12–45)
BILIRUB SERPL-MCNC: 0.4 MG/DL (ref 0.1–1.5)
BUN SERPL-MCNC: 8 MG/DL (ref 8–22)
CALCIUM SERPL-MCNC: 10 MG/DL (ref 8.5–10.5)
CHLORIDE SERPL-SCNC: 104 MMOL/L (ref 96–112)
CO2 SERPL-SCNC: 23 MMOL/L (ref 20–33)
COLOR UR AUTO: YELLOW
CREAT SERPL-MCNC: 0.67 MG/DL (ref 0.5–1.4)
GFR SERPL CREATININE-BSD FRML MDRD: >60 ML/MIN/1.73 M 2
GLOBULIN SER CALC-MCNC: 4.4 G/DL (ref 1.9–3.5)
GLUCOSE SERPL-MCNC: 89 MG/DL (ref 65–99)
GLUCOSE UR QL STRIP.AUTO: NEGATIVE MG/DL
HCG UR QL: NEGATIVE
KETONES UR QL STRIP.AUTO: NEGATIVE MG/DL
LEUKOCYTE ESTERASE UR QL STRIP.AUTO: NEGATIVE
LIPASE SERPL-CCNC: 28 U/L (ref 11–82)
NITRITE UR QL STRIP.AUTO: NEGATIVE
PH UR STRIP.AUTO: 5.5 [PH]
POTASSIUM SERPL-SCNC: 3.7 MMOL/L (ref 3.6–5.5)
PROT SERPL-MCNC: 9 G/DL (ref 6–8.2)
PROT UR QL STRIP: ABNORMAL MG/DL
RBC UR QL AUTO: ABNORMAL
SODIUM SERPL-SCNC: 135 MMOL/L (ref 135–145)
SP GR UR: >=1.03

## 2017-01-17 PROCEDURE — 700105 HCHG RX REV CODE 258: Performed by: EMERGENCY MEDICINE

## 2017-01-17 PROCEDURE — 96375 TX/PRO/DX INJ NEW DRUG ADDON: CPT

## 2017-01-17 PROCEDURE — 36415 COLL VENOUS BLD VENIPUNCTURE: CPT

## 2017-01-17 PROCEDURE — 96361 HYDRATE IV INFUSION ADD-ON: CPT

## 2017-01-17 PROCEDURE — 96374 THER/PROPH/DIAG INJ IV PUSH: CPT

## 2017-01-17 PROCEDURE — 700111 HCHG RX REV CODE 636 W/ 250 OVERRIDE (IP): Performed by: EMERGENCY MEDICINE

## 2017-01-17 PROCEDURE — 81025 URINE PREGNANCY TEST: CPT

## 2017-01-17 PROCEDURE — 81002 URINALYSIS NONAUTO W/O SCOPE: CPT

## 2017-01-17 RX ORDER — METOCLOPRAMIDE 10 MG/1
10 TABLET ORAL EVERY 6 HOURS PRN
Qty: 20 TAB | Refills: 0 | Status: ON HOLD | OUTPATIENT
Start: 2017-01-17 | End: 2019-11-15

## 2017-01-17 RX ADMIN — SODIUM CHLORIDE 1000 ML: 9 INJECTION, SOLUTION INTRAVENOUS at 00:03

## 2017-01-17 RX ADMIN — METOCLOPRAMIDE 10 MG: 5 INJECTION, SOLUTION INTRAMUSCULAR; INTRAVENOUS at 00:03

## 2017-01-17 RX ADMIN — HYDROMORPHONE HYDROCHLORIDE 1 MG: 1 INJECTION, SOLUTION INTRAMUSCULAR; INTRAVENOUS; SUBCUTANEOUS at 00:06

## 2017-01-17 ASSESSMENT — PAIN SCALES - GENERAL: PAINLEVEL_OUTOF10: 4

## 2017-01-17 NOTE — ED NOTES
Patient to follow up with PCP in 1 day. Patient to get ride home from friend. Patient verbalized understanding of home medications and discharge instructions. Patient ambulatory upon discharge.

## 2017-01-17 NOTE — DISCHARGE PLANNING
Medical Social Work    Referral: Consult    Intervention: MSW received a call from ERP requesting MSW meet with pt regarding pt's difficulty in obtaining needed medication for her Chron's Disease.  MSW reviewed pt's chart and pt was admitted on 11/30/2016 due to same complaint.  During pt's visit MD's spoke with pt's GI doctor's office and they were working on obtaining insurance auth for pt's Humira medication; however, they reported that pt didn't follow up with paperwork and appointments.  MSW met with pt at bedside.  Pt states that she has been working with her GI doctor's office and her insurance company regarding her Humira; however, due to the holidays she reports it's been difficult to get a hold of anyone.  Pt was advised to continue to try or look at paying out of pocket.  Pt was also advised to utilize her PCP's office for flare ups to have another possible advocate for the request of Humira through her insurance company.  Pt states that she never thought to go to her PCP for flare ups and has been coming to the ER.  Pt has had two ER visits since her last admit.  Pt will follow up with her GI doctors office and her insurance company.  ERP and bedside RN updated.    Plan: SW will D/C home once medically cleared.

## 2017-01-17 NOTE — ED NOTES
Pt comes in complaining of lower abd pain, diarrhea, and vomiting for approx 2 days. Pt reporting hx of chrons.

## 2017-01-17 NOTE — ED PROVIDER NOTES
ED Provider Note    CHIEF COMPLAINT  Chief Complaint   Patient presents with   • Abdominal Pain   • Diarrhea       HPI  Mi Gomez is a 36 y.o. female who presents to the emergency department for abdominal pain, nausea, vomiting and diarrhea intermittent recurring in greatest over the last couple of weeks. Patient discusses history of Crohn's, poorly controlled on Remicade, started on Humira but is unable to continue maintenance dosing to determine insurance problem. Followed by GI, Dr. Guzmán. Patient states that she has been seen previously for for the same, she uses prednisone or Medrol Dosepak with temporary improvement. Zofran home as been ineffective. For the last 3-4 days she continues to not tolerate oral fluids, multiple episodes of vomiting and diarrhea. Patient describes dark stools, occasionally blood-tinged. Low abdominal cramping, intermittent, 5 out of 10. No fever or chills.No dysuria, hematuria or frequency.    REVIEW OF SYSTEMS  See HPI for further details. All other systems are negative.     PAST MEDICAL HISTORY   has a past medical history of PNA (pneumonia); IBD (inflammatory bowel disease); Depression; Anxiety; Hepatitis C; and Crohn's disease (CMS-HCC).    SOCIAL HISTORY  Social History     Social History Main Topics   • Smoking status: Never Smoker    • Smokeless tobacco: Not on file   • Alcohol Use: No      Comment: last use 7/17/2016; was daily   • Drug Use: No   • Sexual Activity: No       SURGICAL HISTORY   has past surgical history that includes colonoscopy with biopsy (N/A, 6/21/2016).    CURRENT MEDICATIONS  Home Medications     **Home medications have not yet been reviewed for this encounter**          ALLERGIES  Allergies   Allergen Reactions   • Phenergan [Promethazine]      Anxiety     • Seroquel [Quetiapine] Itching   • Trazodone Itching       PHYSICAL EXAM  VITAL SIGNS: /94 mmHg  Pulse 87  Temp(Src) 37 °C (98.6 °F)  Resp 16  Ht 1.524 m (5')  Wt 70.4 kg (155 lb  3.3 oz)  BMI 30.31 kg/m2  SpO2 96%  LMP 12/21/2016 (Exact Date)  Pulse ox interpretation: I interpret this pulse ox as normal.  Constitutional: Alert in no apparent distress.  HENT: Normocephalic, atraumatic. Bilateral external ears normal, Nose normal. Slightly dry mucous membranes.    Eyes: Pupils are equal and reactive, Conjunctiva normal.   Neck: Normal range of motion, Supple.  Lymphatic: No lymphadenopathy noted.   Cardiovascular: Regular rate and rhythm, no murmurs. Distal pulses intact.  No peripheral edema.  Thorax & Lungs: Normal breath sounds.  No wheezing/rales/ronchi. No increased work of breathing.  Abdomen: Soft, minimal tenderness to palpation in the low abdomen. No rebound, guarding or peritonitis.  Rectal: Normal external anus, no hemorrhoids. Normal DR E, trace brown stool. Guaiac negative.  Skin: Warm, Dry, No erythema, No rash.   Musculoskeletal: Good range of motion in all major joints.   Neurologic: Alert , no gross focal deficit noted.  Psychiatric: Affect normal, Judgment normal, Mood normal.       DIAGNOSTIC STUDIES / PROCEDURES    LABS  Results for orders placed or performed during the hospital encounter of 01/16/17   CBC WITH DIFFERENTIAL   Result Value Ref Range    WBC 15.1 (H) 4.8 - 10.8 K/uL    RBC 4.87 4.20 - 5.40 M/uL    Hemoglobin 13.4 12.0 - 16.0 g/dL    Hematocrit 40.3 37.0 - 47.0 %    MCV 82.8 81.4 - 97.8 fL    MCH 27.5 27.0 - 33.0 pg    MCHC 33.3 (L) 33.6 - 35.0 g/dL    RDW 43.9 35.9 - 50.0 fL    Platelet Count 383 164 - 446 K/uL    MPV 11.4 9.0 - 12.9 fL    Neutrophils-Polys 64.90 44.00 - 72.00 %    Lymphocytes 25.10 22.00 - 41.00 %    Monocytes 7.50 0.00 - 13.40 %    Eosinophils 1.40 0.00 - 6.90 %    Basophils 0.40 0.00 - 1.80 %    Immature Granulocytes 0.70 0.00 - 0.90 %    Nucleated RBC 0.00 /100 WBC    Neutrophils (Absolute) 9.80 (H) 2.00 - 7.15 K/uL    Lymphs (Absolute) 3.80 1.00 - 4.80 K/uL    Monos (Absolute) 1.14 (H) 0.00 - 0.85 K/uL    Eos (Absolute) 0.21 0.00 -  0.51 K/uL    Baso (Absolute) 0.06 0.00 - 0.12 K/uL    Immature Granulocytes (abs) 0.10 0.00 - 0.11 K/uL    NRBC (Absolute) 0.00 K/uL   COMP METABOLIC PANEL   Result Value Ref Range    Sodium 135 135 - 145 mmol/L    Potassium 3.7 3.6 - 5.5 mmol/L    Chloride 104 96 - 112 mmol/L    Co2 23 20 - 33 mmol/L    Anion Gap 8.0 0.0 - 11.9    Glucose 89 65 - 99 mg/dL    Bun 8 8 - 22 mg/dL    Creatinine 0.67 0.50 - 1.40 mg/dL    Calcium 10.0 8.5 - 10.5 mg/dL    AST(SGOT) 23 12 - 45 U/L    ALT(SGPT) 14 2 - 50 U/L    Alkaline Phosphatase 71 30 - 99 U/L    Total Bilirubin 0.4 0.1 - 1.5 mg/dL    Albumin 4.6 3.2 - 4.9 g/dL    Total Protein 9.0 (H) 6.0 - 8.2 g/dL    Globulin 4.4 (H) 1.9 - 3.5 g/dL    A-G Ratio 1.0 g/dL   LIPASE   Result Value Ref Range    Lipase 28 11 - 82 U/L   ESTIMATED GFR   Result Value Ref Range    GFR If African American >60 >60 mL/min/1.73 m 2    GFR If Non African American >60 >60 mL/min/1.73 m 2   POC UA   Result Value Ref Range    POC Color Yellow     POC Appearance Cloudy (A)     POC Glucose Negative Negative mg/dL    POC Ketones Negative Negative mg/dL    POC Specific Gravity >=1.030 (A) 1.005-1.030    POC Blood Moderate (A) Negative    POC Urine PH 5.5 5.0-8.0    POC Protein Trace (A) Negative mg/dL    POC Nitrites Negative Negative    POC Leukocyte Esterase Negative Negative   POC URINE PREGNANCY   Result Value Ref Range    POC Urine Pregnancy Test Negative Negative     COURSE & MEDICAL DECISION MAKING  Nursing notes and vital signs were reviewed. (See chart for details)  The patients  records were reviewed, history was obtained from the patient ;     Medical record review: ED evaluation for the same January 2, 2017. Labs normal, symptoms controlled with IV fluid and Zofran. Patient discharged home with Zofran.  Discussion with social work today, Chanel, patient admitted in December for similar symptoms, contact had been made with GI office, apparently patient had been tardy in follow-up and with filling  out paperwork. She needs to do so in order to receive her Humira. She'll be reminded of this conversation referred back to    Evaluation for abdominal pain, vomiting and diarrhea is unrevealing, symptomatology is consistent with a Crohn exacerbation. Mild nonspecific leukocytosis without left shift or bandemia is appropriate in this setting. Labs are otherwise unremarkable, no A derangement or dehydration. Pregnancy negative. Urinalysis within normal limits. Vital signs are stable without fever persistent tachycardia. Abdominal exam is benign without peritonitis or acute abdomen. Patient does have chronic recurring similar symptoms, she has been strongly encouraged to follow-up with her primary care as well as GI to ensure compliance with Humira as previously recommended.    Patient is stable for discharge at this time, anticipatory guidance provided, Reglan for nausea or vomiting, close follow-up is encouraged, and strict ED return instructions have been detailed. Patient is agreeable to the disposition and plan.    Patient's blood pressure was elevated in the emergency department, and has been referred to primary care for close monitoring.    FINAL IMPRESSION  (R10.30) Lower abdominal pain  (primary encounter diagnosis)  (K50.90) Crohn's disease without complication, unspecified gastrointestinal tract location (CMS-[facility])      Electronically signed by: Jessica Reveles, 1/16/2017 11:24 PM      This dictation was created using voice recognition software. The accuracy of the dictation is limited to the abilities of the software. I expect there may be some errors of grammar and possibly content. The nursing notes were reviewed and certain aspects of this information were incorporated into this note.

## 2017-01-17 NOTE — DISCHARGE INSTRUCTIONS
Follow up with primary care and GI this week for reevaluation for close blood pressure monitoring and medication management.    Continue home medications as previously indicated.  Zofran reportedly ineffective, try Reglan every 6 hours as needed for nausea or vomiting.  Encourage oral fluid hydration, clear liquid diet to advance as tolerated in 1-2 days.    Return to the emergency department for persistent worsening abdominal pain, rectal bleeding, fever, vomiting or other new concerns.    Crohn Disease  Crohn disease is a long-lasting (chronic) disease that affects your gastrointestinal (GI) tract. It often causes irritation and swelling (inflammation) in your small intestine and the beginning of your large intestine. However, it can affect any part of your GI tract. Crohn disease is part of a group of illnesses that are known as inflammatory bowel disease (IBD).  Crohn disease may start slowly and get worse over time. Symptoms may come and go. They may also disappear for months or even years at a time (remission).  CAUSES  The exact cause of Crohn disease is not known. It may be a response that causes your body's defense system (immune system) to mistakenly attack healthy cells and tissues (autoimmune response). Your genes and your environment may also play a role.  RISK FACTORS  You may be at greater risk for Crohn disease if you:  · Have other family members with Crohn disease or another IBD.  · Use any tobacco products, including cigarettes, chewing tobacco, or electronic cigarettes.  · Are in your 20s.  · Have Eastern  ancestry.  SIGNS AND SYMPTOMS  The main signs and symptoms of Crohn disease involve your GI tract. These include:  · Diarrhea.  · Rectal bleeding.  · An urgent need to move your bowels.  · The feeling that you are not finished having a bowel movement.  · Abdominal pain or cramping.  · Constipation.  General signs and symptoms of Crohn disease may also include:  · Unexplained weight  loss.  · Fatigue.  · Fever.  · Nausea.  · Loss of appetite.  · Joint pain  · Changes in vision.  · Red bumps on your skin.  DIAGNOSIS  Your health care provider may suspect Crohn disease based on your symptoms and your medical history. Your health care provider will do a physical exam. You may need to see a health care provider who specializes in diseases of the digestive tract (gastroenterologist). You may also have tests to help your health care providers make a diagnosis. These may include:  · Blood tests.  · Stool sample tests.  · Imaging tests, such as X-rays and CT scans.  · Tests to examine the inside of your intestines using a long, flexible tube that has a light and a camera on the end (endoscopy or colonoscopy).  · A procedure to take tissue samples from inside your bowel (biopsy) to be examined under a microscope.  TREATMENT   There is no cure for Crohn disease. Treatment will focus on managing your symptoms. Crohn disease affects each person differently. Your treatment may include:  · Resting your bowels. Drinking only clear liquids or getting nutrition through an IV for a period of time gives your bowels a chance to heal because they are not passing stools.  · Medicines. These may be used alone or in combination (combination therapy). These may include antibiotic medicines. You may be given medicines that help to:  ¨ Reduce inflammation.  ¨ Control your immune system activity.  ¨ Fight infections.  ¨ Relieve cramps and prevent diarrhea.  ¨ Control your pain.  · Surgery. You may need surgery if:  ¨ Medicines and other treatments are no longer working.  ¨ You develop complications from severe Crohn disease.  ¨ A section of your intestine becomes so damaged that it needs to be removed.  HOME CARE INSTRUCTIONS  · Take medicines only as directed by your health care provider.  · If you were prescribed an antibiotic medicine, finish it all even if you start to feel better.  · Keep all follow-up visits as  directed by your health care provider. This is important.  · Talk with your health care provider about changing your diet. This may help your symptoms. Your health care provide may recommend changes, such as:  ¨ Drinking more fluids.  ¨ Avoiding milk and other foods that contain lactose.  ¨ Eating a low-fat diet.  ¨ Avoiding high-fiber foods, such as popcorn and nuts.  ¨ Avoiding carbonated beverages, such as soda.  ¨ Eating smaller meals more often rather than eating large meals.  ¨ Keeping a food diary to identify foods that make your symptoms better or worse.  · Do not use any tobacco products, including cigarettes, chewing tobacco, or electronic cigarettes. If you need help quitting, ask your health care provider.  · Limit alcohol intake to no more than 1 drink per day for nonpregnant women and 2 drinks per day for men. One drink equals 12 ounces of beer, 5 ounces of wine, or 1½ ounces of hard liquor.  · Exercise daily or as directed by your health care provider.  SEEK MEDICAL CARE IF:  · You have diarrhea, abdominal cramps, and other gastrointestinal problems that are present almost all of the time.  · Your symptoms do not improve with treatment.  · You continue to lose weight.  · You develop a rash or sores on your skin.  · You develop eye problems.  · You have a fever.    · Your symptoms get worse.  · You develop new symptoms.  SEEK IMMEDIATE MEDICAL CARE IF:  · You have bloody diarrhea.  · You develop severe abdominal pain.  · You cannot pass stools.     This information is not intended to replace advice given to you by your health care provider. Make sure you discuss any questions you have with your health care provider.     Document Released: 09/27/2006 Document Revised: 01/08/2016 Document Reviewed: 08/05/2015  [employer] Interactive Patient Education ©2016 [employer]

## 2017-08-18 ENCOUNTER — DOCUMENTATION (OUTPATIENT)
Dept: BEHAVIORAL HEALTH | Facility: PHYSICIAN GROUP | Age: 37
End: 2017-08-18

## 2019-11-13 ENCOUNTER — HOSPITAL ENCOUNTER (INPATIENT)
Facility: MEDICAL CENTER | Age: 39
LOS: 2 days | DRG: 386 | End: 2019-11-15
Attending: EMERGENCY MEDICINE | Admitting: HOSPITALIST

## 2019-11-13 ENCOUNTER — APPOINTMENT (OUTPATIENT)
Dept: RADIOLOGY | Facility: MEDICAL CENTER | Age: 39
DRG: 386 | End: 2019-11-13
Attending: EMERGENCY MEDICINE

## 2019-11-13 DIAGNOSIS — K52.9 COLITIS: ICD-10-CM

## 2019-11-13 DIAGNOSIS — F41.9 ANXIETY: ICD-10-CM

## 2019-11-13 DIAGNOSIS — K50.90 CROHN'S DISEASE WITHOUT COMPLICATION (HCC): ICD-10-CM

## 2019-11-13 PROBLEM — D50.9 MICROCYTIC ANEMIA: Status: ACTIVE | Noted: 2019-11-13

## 2019-11-13 LAB
ALBUMIN SERPL BCP-MCNC: 3.9 G/DL (ref 3.2–4.9)
ALBUMIN/GLOB SERPL: 0.8 G/DL
ALP SERPL-CCNC: 76 U/L (ref 30–99)
ALT SERPL-CCNC: 7 U/L (ref 2–50)
ANION GAP SERPL CALC-SCNC: 14 MMOL/L (ref 0–11.9)
ANISOCYTOSIS BLD QL SMEAR: ABNORMAL
APPEARANCE UR: CLEAR
AST SERPL-CCNC: 21 U/L (ref 12–45)
BACTERIA #/AREA URNS HPF: ABNORMAL /HPF
BASOPHILS # BLD AUTO: 0 % (ref 0–1.8)
BASOPHILS # BLD: 0 K/UL (ref 0–0.12)
BILIRUB SERPL-MCNC: 0.3 MG/DL (ref 0.1–1.5)
BILIRUB UR QL STRIP.AUTO: NEGATIVE
BUN SERPL-MCNC: 11 MG/DL (ref 8–22)
CALCIUM SERPL-MCNC: 9.6 MG/DL (ref 8.5–10.5)
CHLORIDE SERPL-SCNC: 99 MMOL/L (ref 96–112)
CO2 SERPL-SCNC: 27 MMOL/L (ref 20–33)
COLOR UR: YELLOW
CREAT SERPL-MCNC: 0.61 MG/DL (ref 0.5–1.4)
CRP SERPL HS-MCNC: 23.44 MG/DL (ref 0–0.75)
EOSINOPHIL # BLD AUTO: 0.16 K/UL (ref 0–0.51)
EOSINOPHIL NFR BLD: 0.9 % (ref 0–6.9)
EPI CELLS #/AREA URNS HPF: NEGATIVE /HPF
ERYTHROCYTE [DISTWIDTH] IN BLOOD BY AUTOMATED COUNT: 46.7 FL (ref 35.9–50)
ERYTHROCYTE [SEDIMENTATION RATE] IN BLOOD BY WESTERGREN METHOD: >120 MM/HOUR (ref 0–20)
GLOBULIN SER CALC-MCNC: 4.6 G/DL (ref 1.9–3.5)
GLUCOSE SERPL-MCNC: 88 MG/DL (ref 65–99)
GLUCOSE UR STRIP.AUTO-MCNC: NEGATIVE MG/DL
HCG SERPL QL: NEGATIVE
HCT VFR BLD AUTO: 33.4 % (ref 37–47)
HGB BLD-MCNC: 9.8 G/DL (ref 12–16)
HYALINE CASTS #/AREA URNS LPF: ABNORMAL /LPF
KETONES UR STRIP.AUTO-MCNC: 40 MG/DL
LACTATE BLD-SCNC: 1.2 MMOL/L (ref 0.5–2)
LEUKOCYTE ESTERASE UR QL STRIP.AUTO: NEGATIVE
LIPASE SERPL-CCNC: 7 U/L (ref 11–82)
LYMPHOCYTES # BLD AUTO: 1.99 K/UL (ref 1–4.8)
LYMPHOCYTES NFR BLD: 11.3 % (ref 22–41)
MANUAL DIFF BLD: NORMAL
MCH RBC QN AUTO: 23.1 PG (ref 27–33)
MCHC RBC AUTO-ENTMCNC: 29.3 G/DL (ref 33.6–35)
MCV RBC AUTO: 78.8 FL (ref 81.4–97.8)
MICRO URNS: ABNORMAL
MICROCYTES BLD QL SMEAR: ABNORMAL
MONOCYTES # BLD AUTO: 0.62 K/UL (ref 0–0.85)
MONOCYTES NFR BLD AUTO: 3.5 % (ref 0–13.4)
MORPHOLOGY BLD-IMP: NORMAL
NEUTROPHILS # BLD AUTO: 14.84 K/UL (ref 2–7.15)
NEUTROPHILS NFR BLD: 84.3 % (ref 44–72)
NITRITE UR QL STRIP.AUTO: NEGATIVE
NRBC # BLD AUTO: 0 K/UL
NRBC BLD-RTO: 0 /100 WBC
PH UR STRIP.AUTO: 6.5 [PH] (ref 5–8)
PLATELET # BLD AUTO: 545 K/UL (ref 164–446)
PLATELET BLD QL SMEAR: NORMAL
PMV BLD AUTO: 10.3 FL (ref 9–12.9)
POTASSIUM SERPL-SCNC: 3.1 MMOL/L (ref 3.6–5.5)
PROT SERPL-MCNC: 8.5 G/DL (ref 6–8.2)
PROT UR QL STRIP: NEGATIVE MG/DL
RBC # BLD AUTO: 4.24 M/UL (ref 4.2–5.4)
RBC # URNS HPF: ABNORMAL /HPF
RBC BLD AUTO: PRESENT
RBC UR QL AUTO: ABNORMAL
SODIUM SERPL-SCNC: 140 MMOL/L (ref 135–145)
SP GR UR REFRACTOMETRY: >1.045
SP GR UR STRIP.AUTO: <=1.005
UROBILINOGEN UR STRIP.AUTO-MCNC: 0.2 MG/DL
WBC # BLD AUTO: 17.6 K/UL (ref 4.8–10.8)
WBC #/AREA URNS HPF: ABNORMAL /HPF

## 2019-11-13 PROCEDURE — 96375 TX/PRO/DX INJ NEW DRUG ADDON: CPT

## 2019-11-13 PROCEDURE — 99223 1ST HOSP IP/OBS HIGH 75: CPT | Performed by: HOSPITALIST

## 2019-11-13 PROCEDURE — 81001 URINALYSIS AUTO W/SCOPE: CPT

## 2019-11-13 PROCEDURE — A9270 NON-COVERED ITEM OR SERVICE: HCPCS | Performed by: HOSPITALIST

## 2019-11-13 PROCEDURE — 74177 CT ABD & PELVIS W/CONTRAST: CPT

## 2019-11-13 PROCEDURE — 700117 HCHG RX CONTRAST REV CODE 255: Performed by: EMERGENCY MEDICINE

## 2019-11-13 PROCEDURE — 700105 HCHG RX REV CODE 258: Performed by: HOSPITALIST

## 2019-11-13 PROCEDURE — 87086 URINE CULTURE/COLONY COUNT: CPT

## 2019-11-13 PROCEDURE — 85007 BL SMEAR W/DIFF WBC COUNT: CPT

## 2019-11-13 PROCEDURE — 86140 C-REACTIVE PROTEIN: CPT

## 2019-11-13 PROCEDURE — 85652 RBC SED RATE AUTOMATED: CPT

## 2019-11-13 PROCEDURE — 700102 HCHG RX REV CODE 250 W/ 637 OVERRIDE(OP): Performed by: HOSPITALIST

## 2019-11-13 PROCEDURE — 700111 HCHG RX REV CODE 636 W/ 250 OVERRIDE (IP): Performed by: HOSPITALIST

## 2019-11-13 PROCEDURE — 87493 C DIFF AMPLIFIED PROBE: CPT

## 2019-11-13 PROCEDURE — 700105 HCHG RX REV CODE 258: Performed by: EMERGENCY MEDICINE

## 2019-11-13 PROCEDURE — 84703 CHORIONIC GONADOTROPIN ASSAY: CPT

## 2019-11-13 PROCEDURE — 770006 HCHG ROOM/CARE - MED/SURG/GYN SEMI*

## 2019-11-13 PROCEDURE — 96374 THER/PROPH/DIAG INJ IV PUSH: CPT

## 2019-11-13 PROCEDURE — 87046 STOOL CULTR AEROBIC BACT EA: CPT

## 2019-11-13 PROCEDURE — 83605 ASSAY OF LACTIC ACID: CPT

## 2019-11-13 PROCEDURE — 87045 FECES CULTURE AEROBIC BACT: CPT

## 2019-11-13 PROCEDURE — 85027 COMPLETE CBC AUTOMATED: CPT

## 2019-11-13 PROCEDURE — 700111 HCHG RX REV CODE 636 W/ 250 OVERRIDE (IP): Performed by: EMERGENCY MEDICINE

## 2019-11-13 PROCEDURE — 36415 COLL VENOUS BLD VENIPUNCTURE: CPT

## 2019-11-13 PROCEDURE — 700101 HCHG RX REV CODE 250: Performed by: HOSPITALIST

## 2019-11-13 PROCEDURE — 80053 COMPREHEN METABOLIC PANEL: CPT

## 2019-11-13 PROCEDURE — 83690 ASSAY OF LIPASE: CPT

## 2019-11-13 PROCEDURE — 99285 EMERGENCY DEPT VISIT HI MDM: CPT

## 2019-11-13 RX ORDER — ONDANSETRON 2 MG/ML
4 INJECTION INTRAMUSCULAR; INTRAVENOUS ONCE
Status: COMPLETED | OUTPATIENT
Start: 2019-11-13 | End: 2019-11-13

## 2019-11-13 RX ORDER — PREDNISONE 20 MG/1
40 TABLET ORAL DAILY
Status: DISCONTINUED | OUTPATIENT
Start: 2019-11-13 | End: 2019-11-13

## 2019-11-13 RX ORDER — MORPHINE SULFATE 4 MG/ML
2 INJECTION, SOLUTION INTRAMUSCULAR; INTRAVENOUS
Status: DISCONTINUED | OUTPATIENT
Start: 2019-11-13 | End: 2019-11-15 | Stop reason: HOSPADM

## 2019-11-13 RX ORDER — SODIUM CHLORIDE 9 MG/ML
1000 INJECTION, SOLUTION INTRAVENOUS ONCE
Status: COMPLETED | OUTPATIENT
Start: 2019-11-13 | End: 2019-11-13

## 2019-11-13 RX ORDER — ONDANSETRON 2 MG/ML
4 INJECTION INTRAMUSCULAR; INTRAVENOUS EVERY 4 HOURS PRN
Status: DISCONTINUED | OUTPATIENT
Start: 2019-11-13 | End: 2019-11-15 | Stop reason: HOSPADM

## 2019-11-13 RX ORDER — PROMETHAZINE HYDROCHLORIDE 25 MG/1
12.5-25 TABLET ORAL EVERY 4 HOURS PRN
Status: DISCONTINUED | OUTPATIENT
Start: 2019-11-13 | End: 2019-11-15 | Stop reason: HOSPADM

## 2019-11-13 RX ORDER — ACETAMINOPHEN 325 MG/1
650 TABLET ORAL EVERY 6 HOURS PRN
Status: DISCONTINUED | OUTPATIENT
Start: 2019-11-13 | End: 2019-11-15 | Stop reason: HOSPADM

## 2019-11-13 RX ORDER — BISACODYL 10 MG
10 SUPPOSITORY, RECTAL RECTAL
Status: DISCONTINUED | OUTPATIENT
Start: 2019-11-13 | End: 2019-11-13

## 2019-11-13 RX ORDER — OXYCODONE HYDROCHLORIDE 5 MG/1
2.5 TABLET ORAL
Status: DISCONTINUED | OUTPATIENT
Start: 2019-11-13 | End: 2019-11-15 | Stop reason: HOSPADM

## 2019-11-13 RX ORDER — PROMETHAZINE HYDROCHLORIDE 25 MG/1
12.5-25 SUPPOSITORY RECTAL EVERY 4 HOURS PRN
Status: DISCONTINUED | OUTPATIENT
Start: 2019-11-13 | End: 2019-11-15 | Stop reason: HOSPADM

## 2019-11-13 RX ORDER — AMOXICILLIN 250 MG
2 CAPSULE ORAL 2 TIMES DAILY
Status: DISCONTINUED | OUTPATIENT
Start: 2019-11-13 | End: 2019-11-13

## 2019-11-13 RX ORDER — CLONIDINE HYDROCHLORIDE 0.1 MG/1
0.1 TABLET ORAL EVERY 6 HOURS PRN
Status: DISCONTINUED | OUTPATIENT
Start: 2019-11-13 | End: 2019-11-15 | Stop reason: HOSPADM

## 2019-11-13 RX ORDER — POLYETHYLENE GLYCOL 3350 17 G/17G
1 POWDER, FOR SOLUTION ORAL
Status: DISCONTINUED | OUTPATIENT
Start: 2019-11-13 | End: 2019-11-13

## 2019-11-13 RX ORDER — ONDANSETRON 4 MG/1
4 TABLET, ORALLY DISINTEGRATING ORAL EVERY 4 HOURS PRN
Status: DISCONTINUED | OUTPATIENT
Start: 2019-11-13 | End: 2019-11-15 | Stop reason: HOSPADM

## 2019-11-13 RX ORDER — PROCHLORPERAZINE EDISYLATE 5 MG/ML
5-10 INJECTION INTRAMUSCULAR; INTRAVENOUS EVERY 4 HOURS PRN
Status: DISCONTINUED | OUTPATIENT
Start: 2019-11-13 | End: 2019-11-15 | Stop reason: HOSPADM

## 2019-11-13 RX ORDER — SODIUM CHLORIDE, SODIUM LACTATE, POTASSIUM CHLORIDE, CALCIUM CHLORIDE 600; 310; 30; 20 MG/100ML; MG/100ML; MG/100ML; MG/100ML
INJECTION, SOLUTION INTRAVENOUS CONTINUOUS
Status: DISCONTINUED | OUTPATIENT
Start: 2019-11-13 | End: 2019-11-14

## 2019-11-13 RX ORDER — MORPHINE SULFATE 4 MG/ML
4 INJECTION, SOLUTION INTRAMUSCULAR; INTRAVENOUS ONCE
Status: COMPLETED | OUTPATIENT
Start: 2019-11-13 | End: 2019-11-13

## 2019-11-13 RX ORDER — OXYCODONE HYDROCHLORIDE 5 MG/1
5 TABLET ORAL
Status: DISCONTINUED | OUTPATIENT
Start: 2019-11-13 | End: 2019-11-15 | Stop reason: HOSPADM

## 2019-11-13 RX ORDER — POTASSIUM CHLORIDE 20 MEQ/1
40 TABLET, EXTENDED RELEASE ORAL DAILY
Status: DISCONTINUED | OUTPATIENT
Start: 2019-11-13 | End: 2019-11-14

## 2019-11-13 RX ORDER — METHYLPREDNISOLONE SODIUM SUCCINATE 125 MG/2ML
62.5 INJECTION, POWDER, LYOPHILIZED, FOR SOLUTION INTRAMUSCULAR; INTRAVENOUS DAILY
Status: DISCONTINUED | OUTPATIENT
Start: 2019-11-13 | End: 2019-11-14

## 2019-11-13 RX ORDER — MIRTAZAPINE 15 MG/1
7.5 TABLET, FILM COATED ORAL
Status: DISCONTINUED | OUTPATIENT
Start: 2019-11-13 | End: 2019-11-15 | Stop reason: HOSPADM

## 2019-11-13 RX ORDER — ARIPIPRAZOLE 10 MG/1
5 TABLET ORAL DAILY
Status: DISCONTINUED | OUTPATIENT
Start: 2019-11-13 | End: 2019-11-15 | Stop reason: HOSPADM

## 2019-11-13 RX ADMIN — METHYLPREDNISOLONE SODIUM SUCCINATE 62.5 MG: 125 INJECTION, POWDER, FOR SOLUTION INTRAMUSCULAR; INTRAVENOUS at 07:33

## 2019-11-13 RX ADMIN — MORPHINE SULFATE 2 MG: 4 INJECTION INTRAVENOUS at 07:33

## 2019-11-13 RX ADMIN — ONDANSETRON 4 MG: 2 INJECTION INTRAMUSCULAR; INTRAVENOUS at 03:49

## 2019-11-13 RX ADMIN — SODIUM CHLORIDE, POTASSIUM CHLORIDE, SODIUM LACTATE AND CALCIUM CHLORIDE: 600; 310; 30; 20 INJECTION, SOLUTION INTRAVENOUS at 06:02

## 2019-11-13 RX ADMIN — MORPHINE SULFATE 2 MG: 4 INJECTION INTRAVENOUS at 17:26

## 2019-11-13 RX ADMIN — ENOXAPARIN SODIUM 40 MG: 40 INJECTION SUBCUTANEOUS at 07:33

## 2019-11-13 RX ADMIN — PREDNISONE 40 MG: 20 TABLET ORAL at 05:07

## 2019-11-13 RX ADMIN — SODIUM CHLORIDE, POTASSIUM CHLORIDE, SODIUM LACTATE AND CALCIUM CHLORIDE: 600; 310; 30; 20 INJECTION, SOLUTION INTRAVENOUS at 20:30

## 2019-11-13 RX ADMIN — SODIUM CHLORIDE 1000 ML: 9 INJECTION, SOLUTION INTRAVENOUS at 03:49

## 2019-11-13 RX ADMIN — MORPHINE SULFATE 2 MG: 4 INJECTION INTRAVENOUS at 14:14

## 2019-11-13 RX ADMIN — CEFTRIAXONE SODIUM 1 G: 1 INJECTION, POWDER, FOR SOLUTION INTRAMUSCULAR; INTRAVENOUS at 12:04

## 2019-11-13 RX ADMIN — MIRTAZAPINE 7.5 MG: 15 TABLET, FILM COATED ORAL at 20:31

## 2019-11-13 RX ADMIN — IOHEXOL 100 ML: 350 INJECTION, SOLUTION INTRAVENOUS at 04:26

## 2019-11-13 RX ADMIN — MORPHINE SULFATE 2 MG: 4 INJECTION INTRAVENOUS at 20:31

## 2019-11-13 RX ADMIN — METRONIDAZOLE 500 MG: 500 INJECTION, SOLUTION INTRAVENOUS at 20:30

## 2019-11-13 RX ADMIN — POTASSIUM CHLORIDE 40 MEQ: 1500 TABLET, EXTENDED RELEASE ORAL at 11:36

## 2019-11-13 RX ADMIN — MORPHINE SULFATE 4 MG: 4 INJECTION INTRAVENOUS at 03:50

## 2019-11-13 RX ADMIN — METRONIDAZOLE 500 MG: 500 INJECTION, SOLUTION INTRAVENOUS at 14:14

## 2019-11-13 ASSESSMENT — ENCOUNTER SYMPTOMS
NEUROLOGICAL NEGATIVE: 1
CONSTITUTIONAL NEGATIVE: 1
DIARRHEA: 1
ABDOMINAL PAIN: 1
VOMITING: 1
RESPIRATORY NEGATIVE: 1
NAUSEA: 1
EYES NEGATIVE: 1
CARDIOVASCULAR NEGATIVE: 1
PSYCHIATRIC NEGATIVE: 1

## 2019-11-13 NOTE — ED TRIAGE NOTES
[place]  Chief Complaint   Patient presents with   • Nausea/Vomiting/Diarrhea     Pt complains of n/v/d for the last month. Pt states she was diagnosed with Crohns disease. Pt states she usually is on humira but has not had it in the last 2 months due to moving back to [place]. Pt states that IV prednisone usually helps with this issue.      Pt ambulatory to triage with above complaint.     /101   Pulse (!) 120   Temp 36.6 °C (97.9 °F) (Temporal)   Resp 14   Ht 1.524 m (5')   Wt 54.3 kg (119 lb 11.4 oz)   LMP 11/06/2019   SpO2 97%   BMI 23.38 kg/m²     Pt informed of triage process and encouraged to notify staff of any changes or concerns. Pt verbalized understanding of instructions. Apologized for long wait time. Pt placed back in lobby.

## 2019-11-13 NOTE — PROGRESS NOTES
Pt arrived via wheelchair with pt transport approx 0640. Pt is A&Ox4. Oriented to room. Educated on scheduled/prn medications, admission process. Pt acknowledges understanding. Pt ambulatory to restroom, UA collected and sent to lab. All needs met at this time.

## 2019-11-13 NOTE — ED NOTES
Attending Hospitalist is Dr Sharp starting at 0700. Please contact this physician for orders, updates or questions today.

## 2019-11-13 NOTE — ED PROVIDER NOTES
"ED Provider Note    Scribed for Reece Scott M.D. by Deepti Mtz. 11/13/2019, 3:29 AM.    Primary care provider: Raegan Wilcox M.D.  Means of arrival: Walk-in  History obtained from: Patient  History limited by: None    CHIEF COMPLAINT  Chief Complaint   Patient presents with   • Nausea/Vomiting/Diarrhea     Pt complains of n/v/d for the last month. Pt states she was diagnosed with Crohns disease. Pt states she usually is on humira but has not had it in the last 2 months due to moving back to [place]. Pt states that IV prednisone usually helps with this issue.        HPI  [place] is a 39 y.o. female, with a history of Crohn's disease, who presents to the Emergency Department for evaluation of nausea and vomiting onset one months ago with worsening severity in the past five days which prompted her to visit the ED for further evaluation of her condition. The patient reports that she is supposed to be receiving a Humera shot every eight weeks however her last Humera shot was eight weeks ago and she is due for another shot. She notes that she has not yet received her Humera shot because just moved from [place] and has not yet established care with a gastroenterologist. The patient reports that a month ago she started to develop symptoms of intermittent nausea, non-bloody vomiting, diarrhea and generalized joint pain. However in the past five days, the patient has developed symptoms have now become constant and have worsened in severity states that she has now developed left-sided abdominal pain which has is consistent with her Crohn's flare up symptoms. She reports that she has taken four doses of Prednisone 10 mg with minimal relief of her dysarthria. No exacerbating or alleviating factors were reported for the patient's abdominal pain. She notes that she is no longer able to tolerate solid foods or liquid intake, she notes \"I throw up even the smallest amount of water\". The patient states that her " episodes of diarrhea have been intermittently green and white in appearance. Denies painful urination or recent fevers.     REVIEW OF SYSTEMS  Pertinent positives include nausea, vomiting, diarrhea, left-sided abdominal pain, joint pain, inability to tolerate oral intake.  Pertinent negatives include no painful urination or recent fevers.. As above, all other systems reviewed and are negative.   See HPI for further details.     PAST MEDICAL HISTORY   has a past medical history of Anxiety, Crohn's disease (HCC), Depression, Hepatitis C, IBD (inflammatory bowel disease), and PNA (pneumonia).    SURGICAL HISTORY   has a past surgical history that includes colonoscopy with biopsy (N/A, 6/21/2016).    SOCIAL HISTORY  Social History     Tobacco Use   • Smoking status: Never Smoker   • Smokeless tobacco: Never Used   Substance Use Topics   • Alcohol use: No     Comment: last use 7/17/2016; was daily   • Drug use: No      Social History     Substance and Sexual Activity   Drug Use No       FAMILY HISTORY  Family History   Problem Relation Age of Onset   • Thyroid Mother    • Bipolar disorder Father         father possibly bipolar   • Diabetes Maternal Grandmother        CURRENT MEDICATIONS   Current Outpatient Medications:   •  metoclopramide (REGLAN) 10 MG Tab, Take 1 Tab by mouth every 6 hours as needed., Disp: 20 Tab, Rfl: 0  •  ondansetron (ZOFRAN ODT) 4 MG TABLET DISPERSIBLE, Take 1 Tab by mouth every 6 hours as needed for Nausea/Vomiting., Disp: 20 Tab, Rfl: 0  •  sertraline (ZOLOFT) 50 MG Tab, Take 1.5 Tabs by mouth every day., Disp: 45 Tab, Rfl: 1  •  mirtazapine (REMERON) 15 MG Tab, Take 0.5 Tabs by mouth every bedtime., Disp: 15 Tab, Rfl: 1  •  aripiprazole (ABILIFY) 5 MG tablet, Take 1 Tab by mouth every day., Disp: 30 Tab, Rfl: 1  •  budesonide (ENTOCROT EC) 3 MG Cap DR Particles capsule, Take 2 Caps by mouth every morning., Disp: 90 Cap, Rfl: 0  •  ondansetron (ZOFRAN ODT) 4 MG TABLET DISPERSIBLE, Take 1 Tab by  mouth 2 times a day as needed for Nausea/Vomiting., Disp: 20 Tab, Rfl: 0  •  oxycodone-acetaminophen (PERCOCET) 5-325 MG Tab, Take 1 Tab by mouth 3 times a day as needed., Disp: , Rfl:   •  HUMIRA PEN 40 MG/0.8ML Pen-injector Kit, , Disp: , Rfl:   •  gabapentin (NEURONTIN) 300 MG Cap, Take 1 Cap by mouth every day., Disp: 30 Cap, Rfl: 1  •  mesalamine (PENTASA) 500 MG CR capsule, Take 1 Cap by mouth 4 times a day., Disp: 240 Cap, Rfl:     ALLERGIES  Allergies   Allergen Reactions   • Phenergan [Promethazine]      Anxiety     • Seroquel [Quetiapine] Itching   • Trazodone Itching       PHYSICAL EXAM  VITAL SIGNS: /95   Pulse (!) 106   Temp 36.6 °C (97.9 °F) (Temporal)   Resp 14   Ht 1.524 m (5')   Wt 54.3 kg (119 lb 11.4 oz)   LMP 11/06/2019   SpO2 97%   BMI 23.38 kg/m²   Vitals reviewed.  Constitutional: Alert.   HENT: No signs of trauma, Bilateral external ears normal, Nose normal.   Eyes: Pupils are equal and reactive, Conjunctiva normal, Non-icteric.   Neck: Normal range of motion, No tenderness, Supple, No stridor.   Lymphatic: No lymphadenopathy noted.   Cardiovascular: Tachycardic rate and rhythm, no murmurs.   Thorax & Lungs: Normal breath sounds, No respiratory distress, No wheezing, No chest tenderness.   Abdomen: Increased bowel sounds. Abdomen is soft with diffuse abdominal tenderness, No peritoneal signs, No masses, No pulsatile masses.   Skin: Warm, Dry, No erythema, No rash.   Back: Normal alignment.   Extremities: Intact distal pulses, No edema, No tenderness, No cyanosis  Musculoskeletal: Good range of motion in all major joints. No major deformities noted.   Neurologic: Alert, Normal motor function, Normal sensory function, No focal deficits noted.   Psychiatric: Affect normal, Judgment normal, Mood normal.       DIAGNOSTIC STUDIES / PROCEDURES    LABS  Labs Reviewed   CBC WITH DIFFERENTIAL - Abnormal; Notable for the following components:       Result Value    WBC 17.6 (*)      Hemoglobin 9.8 (*)     Hematocrit 33.4 (*)     MCV 78.8 (*)     MCH 23.1 (*)     MCHC 29.3 (*)     Platelet Count 545 (*)     Neutrophils-Polys 84.30 (*)     Lymphocytes 11.30 (*)     Neutrophils (Absolute) 14.84 (*)     All other components within normal limits   COMP METABOLIC PANEL - Abnormal; Notable for the following components:    Potassium 3.1 (*)     Anion Gap 14.0 (*)     Total Protein 8.5 (*)     Globulin 4.6 (*)     All other components within normal limits   LIPASE - Abnormal; Notable for the following components:    Lipase 7 (*)     All other components within normal limits   WESTERGREN SED RATE - Abnormal; Notable for the following components:    Sed Rate Westergren >120 (*)     All other components within normal limits   CRP QUANTITIVE (NON-CARDIAC) - Abnormal; Notable for the following components:    Stat C-Reactive Protein 23.44 (*)     All other components within normal limits   LACTIC ACID   HCG QUAL SERUM   ESTIMATED GFR   DIFFERENTIAL MANUAL   PERIPHERAL SMEAR REVIEW   PLATELET ESTIMATE   MORPHOLOGY   URINALYSIS,CULTURE IF INDICATED      All labs reviewed by me.    RADIOLOGY  CT-ABDOMEN-PELVIS WITH   Final Result         1.  Mild diffuse colonic wall thickening and adjacent hazy fat stranding, appearance shows mild diffuse colitis.        The radiologist's interpretation of all radiological studies have been reviewed by me.    COURSE & MEDICAL DECISION MAKING  Nursing notes, VS, PMSFHx reviewed in chart.  Differential diagnoses include but not limited to: Crohn's flare, intraabdominal infection, pregnancy      3:29 AM Patient seen and examined at bedside. Patient arrives tachycardic but afebrile with otherwise normal vital signs. Patient appears well hydrated and non-toxic.  Patient does have diffuse abdominal tenderness without any peritoneal signs to suggest acute abdomen. Discussed plan of care with patient which includes ordering lab work and treating the patient for symptoms. I informed the  patient that she will likely need to be admitted overnight for further monitoring of her condition. Patient verbalizes her understanding and agreement to the plan of care. Ordered for morphology, platelet estimate, peripheral smear review, differential manual, estimated GFR, UA culture, lipase, CMP, CBC with differential, lactic acid, CRP quantitive, westergren SED rate, beat-hCG qualitative serum and CT-abdomen-pelvis to evaluate. Patient will be treated with Morphine 4 mg for pain control, Zofran 4 mg for nausea and IV fluids 1,000 mL for inability to tolerate oral intake and dehydration secondary to acute vomiting and diarrhea.    Labs are consistent with a Crohn's flare with elevated ESR and CRP.  Also noted is a leukocytosis to 17.6 along with a neutrophilic predominance.  Potassium is slightly low at 3.1 with an anion gap of 14.  hCG is negative.    CT of the abdomen/pelvis reveals mild, diffuse colonic wall thickening and adjacent hazy fat stranding consistent with colitis.  I suspect Crohn's flare.  Patient will require admission to the hospital for continued IV fluid hydration, steroids, and likely GI consultation if she is not established in the local area.  Patient given a dose of prednisone in the ED.    HYDRATION: Based on the patient's presentation of Inability to take oral fluids and dehydration secondary to acute vomiting and diarrhea the patient was given IV fluids. IV Hydration was used because oral hydration was not adequate alone. Upon recheck following hydration, the patient was improved.    4:36 AM I discussed the patient's case and the above findings with Dr. Zarco (Hospitalist) who agrees to evaluate the patient for hospitalization.    DISPOSITION:  Patient will be admitted to Dr. Zarco (Hospitalist) in guarded condition.      FINAL IMPRESSION  1. Colitis          I, Deepti Mtz (Scribe), am scribing for, and in the presence of, Reece Scott M.D..    Electronically signed by: Deepti Mtz  (Scribe), 11/13/2019    IReece M.D. personally performed the services described in this documentation, as scribed by Deepti Mtz in my presence, and it is both accurate and complete.    C    The note accurately reflects work and decisions made by me.  Reece Scott  11/13/2019  7:46 AM

## 2019-11-13 NOTE — ASSESSMENT & PLAN NOTE
Suspect due to chronic disease.  No current evidence of bleed.  Monitor.  Transfuse as needed for hemoglobin less than 7 g/Catherine  Anemia work-up ordered.

## 2019-11-13 NOTE — ASSESSMENT & PLAN NOTE
With current exacerbation or flare.  Patient is a few days out from her next Remicade and injection.   Pain management as needed.  Monitor.  Start tapering of Solu-Medrol.

## 2019-11-13 NOTE — H&P
Hospital Medicine History & Physical Note    Date of Service  11/13/2019    Primary Care Physician  No primary care provider on file.    Consultants  None    Code Status  Full code     Chief Complaint  Abdominal pain     History of Presenting Illness  39 y.o. female with history of Crohn's disease on immunomodulatory therapy, depression with psychotic features which is controlled, and prior alcoholism currently in remission, was in her usual state of health until several days prior to admission.  She began to have diffuse abdominal pain, without radiation, dull in nature, with associated joint pain.  She reports her last Remicade dose approximately 2 months prior to admission, and that she has not yet scheduled her next dose.  She did have some leftover prednisone at home which she took a few doses with some relief of her pain, otherwise no exacerbating or alleviating factors.  In the 2 days prior to admission, she was unable to tolerate oral intake instead having multiple bowel movements around 15 every day, as well as persistent nausea and vomiting.  She denies fever or chills.  She has no chest pain or shortness of breath, no other complaints.      Review of Systems  Review of Systems   Constitutional: Negative.    HENT: Negative.    Eyes: Negative.    Respiratory: Negative.    Cardiovascular: Negative.    Gastrointestinal: Positive for abdominal pain, diarrhea, nausea and vomiting.   Genitourinary: Negative.    Musculoskeletal: Positive for joint pain.   Skin: Negative.    Neurological: Negative.    Endo/Heme/Allergies: Negative.    Psychiatric/Behavioral: Negative.        Past Medical History   has a past medical history of Anxiety, Crohn's disease (HCC), Depression, Hepatitis C, IBD (inflammatory bowel disease), and PNA (pneumonia).    Surgical History   has a past surgical history that includes colonoscopy with biopsy (N/A, 6/21/2016).     Family History  family history includes Bipolar disorder in her  father; Diabetes in her maternal grandmother; Thyroid in her mother.     Social History   reports that she has never smoked. She has never used smokeless tobacco. She reports that she does not drink alcohol or use drugs.    Allergies  Allergies   Allergen Reactions   • Phenergan [Promethazine]      Anxiety     • Seroquel [Quetiapine] Itching   • Trazodone Itching       Medications  Prior to Admission Medications   Prescriptions Last Dose Informant Patient Reported? Taking?   HUMIRA PEN 40 MG/0.8ML Pen-injector Kit   Yes No   aripiprazole (ABILIFY) 5 MG tablet   No No   Sig: Take 1 Tab by mouth every day.   budesonide (ENTOCROT EC) 3 MG Cap DR Particles capsule   No No   Sig: Take 2 Caps by mouth every morning.   gabapentin (NEURONTIN) 300 MG Cap   No No   Sig: Take 1 Cap by mouth every day.   mesalamine (PENTASA) 500 MG CR capsule  Patient No No   Sig: Take 1 Cap by mouth 4 times a day.   metoclopramide (REGLAN) 10 MG Tab   No No   Sig: Take 1 Tab by mouth every 6 hours as needed.   mirtazapine (REMERON) 15 MG Tab   No No   Sig: Take 0.5 Tabs by mouth every bedtime.   ondansetron (ZOFRAN ODT) 4 MG TABLET DISPERSIBLE   No No   Sig: Take 1 Tab by mouth 2 times a day as needed for Nausea/Vomiting.   ondansetron (ZOFRAN ODT) 4 MG TABLET DISPERSIBLE   No No   Sig: Take 1 Tab by mouth every 6 hours as needed for Nausea/Vomiting.   oxycodone-acetaminophen (PERCOCET) 5-325 MG Tab   Yes No   Sig: Take 1 Tab by mouth 3 times a day as needed.   sertraline (ZOLOFT) 50 MG Tab   No No   Sig: Take 1.5 Tabs by mouth every day.      Facility-Administered Medications: None       Physical Exam  Temp:  [36.6 °C (97.9 °F)] 36.6 °C (97.9 °F)  Pulse:  [106-120] 109  Resp:  [14] 14  BP: (138-149)/() 144/90  SpO2:  [91 %-98 %] 91 %    Physical Exam  Vitals signs and nursing note reviewed.   Constitutional:       General: She is not in acute distress.     Appearance: Normal appearance. She is normal weight. She is not ill-appearing.    HENT:      Head: Normocephalic and atraumatic.      Nose: Nose normal.      Mouth/Throat:      Mouth: Mucous membranes are moist.      Pharynx: No oropharyngeal exudate.   Eyes:      Extraocular Movements: Extraocular movements intact.      Conjunctiva/sclera: Conjunctivae normal.      Pupils: Pupils are equal, round, and reactive to light.   Neck:      Musculoskeletal: Normal range of motion and neck supple.   Cardiovascular:      Rate and Rhythm: Normal rate and regular rhythm.      Pulses: Normal pulses.      Heart sounds: Normal heart sounds. No murmur. No friction rub. No gallop.    Pulmonary:      Effort: Pulmonary effort is normal. No respiratory distress.      Breath sounds: Normal breath sounds.   Abdominal:      General: Abdomen is flat.      Palpations: Abdomen is soft.      Tenderness: There is no tenderness.   Musculoskeletal: Normal range of motion.   Skin:     General: Skin is warm and dry.   Neurological:      General: No focal deficit present.      Mental Status: She is alert and oriented to person, place, and time.   Psychiatric:         Mood and Affect: Mood normal.         Behavior: Behavior normal.         Laboratory:  Recent Labs     11/13/19  0300   WBC 17.6*   RBC 4.24   HEMOGLOBIN 9.8*   HEMATOCRIT 33.4*   MCV 78.8*   MCH 23.1*   MCHC 29.3*   RDW 46.7   PLATELETCT 545*   MPV 10.3     Recent Labs     11/13/19  0300   SODIUM 140   POTASSIUM 3.1*   CHLORIDE 99   CO2 27   GLUCOSE 88   BUN 11   CREATININE 0.61   CALCIUM 9.6     Recent Labs     11/13/19  0300   ALTSGPT 7   ASTSGOT 21   ALKPHOSPHAT 76   TBILIRUBIN 0.3   LIPASE 7*   GLUCOSE 88         No results for input(s): NTPROBNP in the last 72 hours.      No results for input(s): TROPONINT in the last 72 hours.    Urinalysis:    No results found     Imaging:  CT-ABDOMEN-PELVIS WITH   Final Result         1.  Mild diffuse colonic wall thickening and adjacent hazy fat stranding, appearance shows mild diffuse colitis.             Assessment/Plan:  I anticipate this patient will require at least two midnights for appropriate medical management, necessitating inpatient admission.    * Crohn's disease (HCC)- (present on admission)  Assessment & Plan  With current exacerbation or flare.  Patient is a few days out from her next Remicade and injection.  We will start with intravenous methylprednisolone at 60 mg daily.  Pain management as needed.  Monitor.    Microcytic anemia  Assessment & Plan  Suspect due to chronic disease.  No current evidence of bleed.  Monitor.  Transfuse as needed for hemoglobin less than 7 g/Catherine    Alcohol use disorder, moderate, in early remission (HCC)- (present on admission)  Assessment & Plan  Currently in remission.    Major depressive disorder, recurrent, severe without psychotic features (HCC)- (present on admission)  Assessment & Plan  Controlled with current medication regimen which will be continued.  Monitor.        VTE prophylaxis: SCD, lovenox

## 2019-11-14 LAB
ANION GAP SERPL CALC-SCNC: 12 MMOL/L (ref 0–11.9)
BASOPHILS # BLD AUTO: 0.1 % (ref 0–1.8)
BASOPHILS # BLD: 0.03 K/UL (ref 0–0.12)
BUN SERPL-MCNC: 9 MG/DL (ref 8–22)
C DIFF DNA SPEC QL NAA+PROBE: NEGATIVE
C DIFF TOX GENS STL QL NAA+PROBE: NEGATIVE
CALCIUM SERPL-MCNC: 9.1 MG/DL (ref 8.5–10.5)
CHLORIDE SERPL-SCNC: 102 MMOL/L (ref 96–112)
CO2 SERPL-SCNC: 27 MMOL/L (ref 20–33)
CREAT SERPL-MCNC: 0.51 MG/DL (ref 0.5–1.4)
EOSINOPHIL # BLD AUTO: 0 K/UL (ref 0–0.51)
EOSINOPHIL NFR BLD: 0 % (ref 0–6.9)
ERYTHROCYTE [DISTWIDTH] IN BLOOD BY AUTOMATED COUNT: 46.5 FL (ref 35.9–50)
GLUCOSE SERPL-MCNC: 96 MG/DL (ref 65–99)
HCT VFR BLD AUTO: 30.5 % (ref 37–47)
HGB BLD-MCNC: 9.4 G/DL (ref 12–16)
IMM GRANULOCYTES # BLD AUTO: 0.17 K/UL (ref 0–0.11)
IMM GRANULOCYTES NFR BLD AUTO: 0.7 % (ref 0–0.9)
LYMPHOCYTES # BLD AUTO: 2.51 K/UL (ref 1–4.8)
LYMPHOCYTES NFR BLD: 10.2 % (ref 22–41)
MCH RBC QN AUTO: 24 PG (ref 27–33)
MCHC RBC AUTO-ENTMCNC: 30.7 G/DL (ref 33.6–35)
MCV RBC AUTO: 78 FL (ref 81.4–97.8)
MONOCYTES # BLD AUTO: 1.58 K/UL (ref 0–0.85)
MONOCYTES NFR BLD AUTO: 6.4 % (ref 0–13.4)
NEUTROPHILS # BLD AUTO: 20.23 K/UL (ref 2–7.15)
NEUTROPHILS NFR BLD: 82.6 % (ref 44–72)
NRBC # BLD AUTO: 0 K/UL
NRBC BLD-RTO: 0 /100 WBC
PLATELET # BLD AUTO: 432 K/UL (ref 164–446)
PMV BLD AUTO: 10.4 FL (ref 9–12.9)
POTASSIUM SERPL-SCNC: 3.5 MMOL/L (ref 3.6–5.5)
RBC # BLD AUTO: 5.05 M/UL (ref 4.2–5.4)
SODIUM SERPL-SCNC: 141 MMOL/L (ref 135–145)
WBC # BLD AUTO: 24.5 K/UL (ref 4.8–10.8)

## 2019-11-14 PROCEDURE — 700105 HCHG RX REV CODE 258: Performed by: HOSPITALIST

## 2019-11-14 PROCEDURE — 99233 SBSQ HOSP IP/OBS HIGH 50: CPT | Performed by: HOSPITALIST

## 2019-11-14 PROCEDURE — 700102 HCHG RX REV CODE 250 W/ 637 OVERRIDE(OP): Performed by: HOSPITALIST

## 2019-11-14 PROCEDURE — 770021 HCHG ROOM/CARE - ISO PRIVATE

## 2019-11-14 PROCEDURE — 700101 HCHG RX REV CODE 250: Performed by: HOSPITALIST

## 2019-11-14 PROCEDURE — 80048 BASIC METABOLIC PNL TOTAL CA: CPT

## 2019-11-14 PROCEDURE — A9270 NON-COVERED ITEM OR SERVICE: HCPCS | Performed by: HOSPITALIST

## 2019-11-14 PROCEDURE — 700111 HCHG RX REV CODE 636 W/ 250 OVERRIDE (IP): Performed by: HOSPITALIST

## 2019-11-14 PROCEDURE — 85025 COMPLETE CBC W/AUTO DIFF WBC: CPT

## 2019-11-14 PROCEDURE — 36415 COLL VENOUS BLD VENIPUNCTURE: CPT

## 2019-11-14 RX ORDER — POTASSIUM CHLORIDE 20 MEQ/1
40 TABLET, EXTENDED RELEASE ORAL 2 TIMES DAILY
Status: DISCONTINUED | OUTPATIENT
Start: 2019-11-14 | End: 2019-11-15 | Stop reason: HOSPADM

## 2019-11-14 RX ORDER — METHYLPREDNISOLONE SODIUM SUCCINATE 40 MG/ML
40 INJECTION, POWDER, LYOPHILIZED, FOR SOLUTION INTRAMUSCULAR; INTRAVENOUS DAILY
Status: DISCONTINUED | OUTPATIENT
Start: 2019-11-15 | End: 2019-11-15 | Stop reason: HOSPADM

## 2019-11-14 RX ADMIN — MORPHINE SULFATE 2 MG: 4 INJECTION INTRAVENOUS at 04:56

## 2019-11-14 RX ADMIN — MORPHINE SULFATE 2 MG: 4 INJECTION INTRAVENOUS at 10:13

## 2019-11-14 RX ADMIN — MORPHINE SULFATE 2 MG: 4 INJECTION INTRAVENOUS at 00:08

## 2019-11-14 RX ADMIN — POTASSIUM CHLORIDE 40 MEQ: 1500 TABLET, EXTENDED RELEASE ORAL at 18:03

## 2019-11-14 RX ADMIN — MORPHINE SULFATE 2 MG: 4 INJECTION INTRAVENOUS at 14:20

## 2019-11-14 RX ADMIN — MORPHINE SULFATE 2 MG: 4 INJECTION INTRAVENOUS at 18:42

## 2019-11-14 RX ADMIN — ONDANSETRON 4 MG: 2 INJECTION INTRAMUSCULAR; INTRAVENOUS at 22:01

## 2019-11-14 RX ADMIN — CEFTRIAXONE SODIUM 1 G: 1 INJECTION, POWDER, FOR SOLUTION INTRAMUSCULAR; INTRAVENOUS at 04:25

## 2019-11-14 RX ADMIN — METRONIDAZOLE 500 MG: 500 INJECTION, SOLUTION INTRAVENOUS at 14:15

## 2019-11-14 RX ADMIN — METRONIDAZOLE 500 MG: 500 INJECTION, SOLUTION INTRAVENOUS at 05:15

## 2019-11-14 RX ADMIN — SODIUM CHLORIDE, POTASSIUM CHLORIDE, SODIUM LACTATE AND CALCIUM CHLORIDE: 600; 310; 30; 20 INJECTION, SOLUTION INTRAVENOUS at 13:03

## 2019-11-14 RX ADMIN — METHYLPREDNISOLONE SODIUM SUCCINATE 62.5 MG: 125 INJECTION, POWDER, FOR SOLUTION INTRAMUSCULAR; INTRAVENOUS at 04:24

## 2019-11-14 RX ADMIN — MORPHINE SULFATE 2 MG: 4 INJECTION INTRAVENOUS at 22:02

## 2019-11-14 RX ADMIN — POTASSIUM CHLORIDE 40 MEQ: 1500 TABLET, EXTENDED RELEASE ORAL at 04:24

## 2019-11-14 ASSESSMENT — ENCOUNTER SYMPTOMS
FEVER: 0
DIAPHORESIS: 0
BRUISES/BLEEDS EASILY: 0
EYE DISCHARGE: 0
DEPRESSION: 0
COUGH: 0
WHEEZING: 0
HEADACHES: 0
MYALGIAS: 0
NECK PAIN: 0
WEAKNESS: 0
DIARRHEA: 1
DIZZINESS: 0
SPUTUM PRODUCTION: 0
HEMOPTYSIS: 0
CHILLS: 0
CLAUDICATION: 0
SHORTNESS OF BREATH: 0
EYE PAIN: 0
NAUSEA: 1
SENSORY CHANGE: 0
VOMITING: 1
SORE THROAT: 0
CONSTIPATION: 0
PALPITATIONS: 0
FOCAL WEAKNESS: 0
BACK PAIN: 0
LOSS OF CONSCIOUSNESS: 0
ABDOMINAL PAIN: 1
SPEECH CHANGE: 0

## 2019-11-14 ASSESSMENT — LIFESTYLE VARIABLES: SUBSTANCE_ABUSE: 0

## 2019-11-14 NOTE — PROGRESS NOTES
Patient seen and examined, reviewed chart.   Elevated wbc 17 and colitis on CT.  Ordered stool culture, rocephin, flagyl to ensure no infectious component.  No acute abdomen on exam.  Replacing po Kdur for K 3.1.  dc'd laxatives, bowel protocol.  CONNIE working on getting her meds through [facility].

## 2019-11-14 NOTE — CARE PLAN
Problem: Communication  Goal: The ability to communicate needs accurately and effectively will improve  Outcome: PROGRESSING AS EXPECTED  Note:   Patient A&Ox4, able to make needs known and using call light appropriately for assistance.     Problem: Safety  Goal: Will remain free from falls  Outcome: PROGRESSING AS EXPECTED  Note:   Safety education provided. Bed locked and in lowest position, bed alarm on. Call light and belongings within reach. Hourly rounding in place.      Problem: Venous Thromboembolism (VTW)/Deep Vein Thrombosis (DVT) Prevention:  Goal: Patient will participate in Venous Thrombosis (VTE)/Deep Vein Thrombosis (DVT)Prevention Measures  Outcome: PROGRESSING AS EXPECTED  Note:   Lovenox to be administered per MAR.     Problem: Bowel/Gastric:  Goal: Normal bowel function is maintained or improved  Outcome: PROGRESSING SLOWER THAN EXPECTED  Note:   Patient reporting having had multiple loose BMs since beginning of shift. Will continue to monitor.     Problem: Pain Management  Goal: Pain level will decrease to patient's comfort goal  Outcome: PROGRESSING AS EXPECTED  Note:   Patient reporting abdominal pain; medicated per MAR. Patient reporting adequate pain relief; able to sleep comfortably.

## 2019-11-15 ENCOUNTER — PATIENT OUTREACH (OUTPATIENT)
Dept: HEALTH INFORMATION MANAGEMENT | Facility: OTHER | Age: 39
End: 2019-11-15

## 2019-11-15 VITALS
HEIGHT: 60 IN | OXYGEN SATURATION: 99 % | SYSTOLIC BLOOD PRESSURE: 152 MMHG | HEART RATE: 67 BPM | WEIGHT: 121.25 LBS | TEMPERATURE: 98.6 F | BODY MASS INDEX: 23.81 KG/M2 | RESPIRATION RATE: 16 BRPM | DIASTOLIC BLOOD PRESSURE: 87 MMHG

## 2019-11-15 LAB
ANION GAP SERPL CALC-SCNC: 8 MMOL/L (ref 0–11.9)
BACTERIA UR CULT: NORMAL
BASOPHILS # BLD AUTO: 0.3 % (ref 0–1.8)
BASOPHILS # BLD: 0.04 K/UL (ref 0–0.12)
BUN SERPL-MCNC: 11 MG/DL (ref 8–22)
CALCIUM SERPL-MCNC: 9.8 MG/DL (ref 8.5–10.5)
CHLORIDE SERPL-SCNC: 103 MMOL/L (ref 96–112)
CO2 SERPL-SCNC: 26 MMOL/L (ref 20–33)
CREAT SERPL-MCNC: 0.61 MG/DL (ref 0.5–1.4)
EOSINOPHIL # BLD AUTO: 0 K/UL (ref 0–0.51)
EOSINOPHIL NFR BLD: 0 % (ref 0–6.9)
ERYTHROCYTE [DISTWIDTH] IN BLOOD BY AUTOMATED COUNT: 48.2 FL (ref 35.9–50)
FERRITIN SERPL-MCNC: 38.6 NG/ML (ref 10–291)
FOLATE SERPL-MCNC: 7 NG/ML
GLUCOSE SERPL-MCNC: 134 MG/DL (ref 65–99)
HCT VFR BLD AUTO: 35.9 % (ref 37–47)
HGB BLD-MCNC: 10.6 G/DL (ref 12–16)
IMM GRANULOCYTES # BLD AUTO: 0.66 K/UL (ref 0–0.11)
IMM GRANULOCYTES NFR BLD AUTO: 4.2 % (ref 0–0.9)
IRON SATN MFR SERPL: 14 % (ref 15–55)
IRON SERPL-MCNC: 38 UG/DL (ref 40–170)
LYMPHOCYTES # BLD AUTO: 1.19 K/UL (ref 1–4.8)
LYMPHOCYTES NFR BLD: 7.6 % (ref 22–41)
MCH RBC QN AUTO: 23.9 PG (ref 27–33)
MCHC RBC AUTO-ENTMCNC: 29.5 G/DL (ref 33.6–35)
MCV RBC AUTO: 80.9 FL (ref 81.4–97.8)
MONOCYTES # BLD AUTO: 0.13 K/UL (ref 0–0.85)
MONOCYTES NFR BLD AUTO: 0.8 % (ref 0–13.4)
NEUTROPHILS # BLD AUTO: 13.74 K/UL (ref 2–7.15)
NEUTROPHILS NFR BLD: 87.1 % (ref 44–72)
NRBC # BLD AUTO: 0 K/UL
NRBC BLD-RTO: 0 /100 WBC
PLATELET # BLD AUTO: 536 K/UL (ref 164–446)
PMV BLD AUTO: 9.8 FL (ref 9–12.9)
POTASSIUM SERPL-SCNC: 3.9 MMOL/L (ref 3.6–5.5)
RBC # BLD AUTO: 4.44 M/UL (ref 4.2–5.4)
SIGNIFICANT IND 70042: NORMAL
SITE SITE: NORMAL
SODIUM SERPL-SCNC: 137 MMOL/L (ref 135–145)
SOURCE SOURCE: NORMAL
TIBC SERPL-MCNC: 263 UG/DL (ref 250–450)
VIT B12 SERPL-MCNC: >1500 PG/ML (ref 211–911)
WBC # BLD AUTO: 15.8 K/UL (ref 4.8–10.8)

## 2019-11-15 PROCEDURE — 82728 ASSAY OF FERRITIN: CPT

## 2019-11-15 PROCEDURE — 83540 ASSAY OF IRON: CPT

## 2019-11-15 PROCEDURE — 90686 IIV4 VACC NO PRSV 0.5 ML IM: CPT | Performed by: HOSPITALIST

## 2019-11-15 PROCEDURE — 700102 HCHG RX REV CODE 250 W/ 637 OVERRIDE(OP): Performed by: HOSPITALIST

## 2019-11-15 PROCEDURE — A9270 NON-COVERED ITEM OR SERVICE: HCPCS | Performed by: HOSPITALIST

## 2019-11-15 PROCEDURE — 99239 HOSP IP/OBS DSCHRG MGMT >30: CPT | Performed by: HOSPITALIST

## 2019-11-15 PROCEDURE — 700111 HCHG RX REV CODE 636 W/ 250 OVERRIDE (IP): Performed by: HOSPITALIST

## 2019-11-15 PROCEDURE — 85025 COMPLETE CBC W/AUTO DIFF WBC: CPT

## 2019-11-15 PROCEDURE — 82746 ASSAY OF FOLIC ACID SERUM: CPT

## 2019-11-15 PROCEDURE — 82607 VITAMIN B-12: CPT

## 2019-11-15 PROCEDURE — 36415 COLL VENOUS BLD VENIPUNCTURE: CPT

## 2019-11-15 PROCEDURE — 90471 IMMUNIZATION ADMIN: CPT

## 2019-11-15 PROCEDURE — 83550 IRON BINDING TEST: CPT

## 2019-11-15 PROCEDURE — 80048 BASIC METABOLIC PNL TOTAL CA: CPT

## 2019-11-15 RX ORDER — ONDANSETRON 4 MG/1
4 TABLET, ORALLY DISINTEGRATING ORAL EVERY 4 HOURS PRN
Qty: 30 TAB | Refills: 0 | Status: SHIPPED | OUTPATIENT
Start: 2019-11-15 | End: 2023-10-23

## 2019-11-15 RX ORDER — MESALAMINE 500 MG/1
500 CAPSULE, EXTENDED RELEASE ORAL 4 TIMES DAILY
Qty: 240 CAP | Refills: 3 | Status: ON HOLD | OUTPATIENT
Start: 2019-11-15 | End: 2022-11-07

## 2019-11-15 RX ORDER — ARIPIPRAZOLE 5 MG/1
5 TABLET ORAL DAILY
Qty: 30 TAB | Refills: 3 | Status: ON HOLD | OUTPATIENT
Start: 2019-11-15 | End: 2022-11-07

## 2019-11-15 RX ORDER — PREDNISONE 20 MG/1
TABLET ORAL
Qty: 21 TAB | Refills: 0 | Status: SHIPPED
Start: 2019-11-15 | End: 2020-02-19

## 2019-11-15 RX ORDER — GABAPENTIN 300 MG/1
300 CAPSULE ORAL DAILY
Qty: 30 CAP | Refills: 3 | Status: ON HOLD | OUTPATIENT
Start: 2019-11-15 | End: 2022-11-07

## 2019-11-15 RX ORDER — BUDESONIDE 3 MG/1
6 CAPSULE, COATED PELLETS ORAL EVERY MORNING
Qty: 90 CAP | Refills: 3 | Status: ON HOLD | OUTPATIENT
Start: 2019-11-15 | End: 2022-11-07

## 2019-11-15 RX ORDER — MIRTAZAPINE 15 MG/1
7.5 TABLET, FILM COATED ORAL
Qty: 30 TAB | Refills: 3 | Status: ON HOLD | OUTPATIENT
Start: 2019-11-15 | End: 2022-11-07

## 2019-11-15 RX ADMIN — INFLUENZA A VIRUS A/BRISBANE/02/2018 IVR-190 (H1N1) ANTIGEN (FORMALDEHYDE INACTIVATED), INFLUENZA A VIRUS A/KANSAS/14/2017 X-327 (H3N2) ANTIGEN (FORMALDEHYDE INACTIVATED), INFLUENZA B VIRUS B/PHUKET/3073/2013 ANTIGEN (FORMALDEHYDE INACTIVATED), AND INFLUENZA B VIRUS B/MARYLAND/15/2016 BX-69A ANTIGEN (FORMALDEHYDE INACTIVATED) 0.5 ML: 15; 15; 15; 15 INJECTION, SUSPENSION INTRAMUSCULAR at 12:10

## 2019-11-15 RX ADMIN — MORPHINE SULFATE 2 MG: 4 INJECTION INTRAVENOUS at 04:11

## 2019-11-15 RX ADMIN — POTASSIUM CHLORIDE 40 MEQ: 1500 TABLET, EXTENDED RELEASE ORAL at 04:11

## 2019-11-15 RX ADMIN — MORPHINE SULFATE 2 MG: 4 INJECTION INTRAVENOUS at 09:11

## 2019-11-15 RX ADMIN — POTASSIUM CHLORIDE 40 MEQ: 1500 TABLET, EXTENDED RELEASE ORAL at 16:29

## 2019-11-15 RX ADMIN — METHYLPREDNISOLONE SODIUM SUCCINATE 40 MG: 40 INJECTION, POWDER, FOR SOLUTION INTRAMUSCULAR; INTRAVENOUS at 04:11

## 2019-11-15 RX ADMIN — MORPHINE SULFATE 2 MG: 4 INJECTION INTRAVENOUS at 01:02

## 2019-11-15 RX ADMIN — MORPHINE SULFATE 2 MG: 4 INJECTION INTRAVENOUS at 12:10

## 2019-11-15 RX ADMIN — MORPHINE SULFATE 2 MG: 4 INJECTION INTRAVENOUS at 15:32

## 2019-11-15 NOTE — PROGRESS NOTES
Hospital Medicine Daily Progress Note    Date of Service  11/14/2019    Chief Complaint  39 y.o. female admitted 11/13/2019 with cramping abdominal pain diarrhea nausea vomiting.    Hospital Course    11/14: This 39-year-old female with history of Crohn's disease previously on Humira, depression, prior alcoholism in remission who has been off of all of her Crohn's disease medications for several months due to a move from [facility].  She presents with severe nausea vomiting cramping abdominal pain.  CT abdomen pelvis with inflammation of the colon seen consistent with colitis.  Her white count was elevated at 17.  She was started on prednisone 62.5 mg IV daily on admission, started on IV fluids.  I have ordered stool culture which so far no growth x24 hours therefore I discontinued Rocephin and Flagyl IV.  I have also ordered a C. difficile rule out.  I have also discontinued her bowel protocol that was ordered on admission.  She is tolerating a diet at this time therefore I discontinued her IV fluids.  I am also replacing her potassium low at 3.1 with 40 twice daily.  Urine culture also is pending.  Patient overall is feeling better with soft abdomen on palpation.*        Consultants/Specialty  none    Code Status  full    Disposition  Home once medically stable.   is to help with medication costs since this is the reason why patient has been off of her Humira and other Crohn's medications.    Review of Systems  Review of Systems   Constitutional: Negative for chills, diaphoresis, fever and malaise/fatigue.   HENT: Negative for congestion and sore throat.    Eyes: Negative for pain and discharge.   Respiratory: Negative for cough, hemoptysis, sputum production, shortness of breath and wheezing.    Cardiovascular: Negative for chest pain, palpitations, claudication and leg swelling.   Gastrointestinal: Positive for abdominal pain, diarrhea, nausea and vomiting. Negative for constipation  and melena.   Genitourinary: Negative for dysuria, frequency and urgency.   Musculoskeletal: Negative for back pain, joint pain, myalgias and neck pain.   Skin: Negative for itching and rash.   Neurological: Negative for dizziness, sensory change, speech change, focal weakness, loss of consciousness, weakness and headaches.   Endo/Heme/Allergies: Does not bruise/bleed easily.   Psychiatric/Behavioral: Negative for depression, substance abuse and suicidal ideas.        Physical Exam  Temp:  [36.4 °C (97.5 °F)-37.5 °C (99.5 °F)] 36.9 °C (98.4 °F)  Pulse:  [60-91] 68  Resp:  [16] 16  BP: (118-144)/(68-93) 118/79  SpO2:  [94 %-98 %] 98 %    Physical Exam  Vitals signs and nursing note reviewed.   Constitutional:       General: She is not in acute distress.     Appearance: She is well-developed. She is not diaphoretic.   HENT:      Head: Normocephalic and atraumatic.      Nose: Nose normal.      Mouth/Throat:      Pharynx: No oropharyngeal exudate.   Eyes:      General: No scleral icterus.        Right eye: No discharge.         Left eye: No discharge.      Conjunctiva/sclera: Conjunctivae normal.      Pupils: Pupils are equal, round, and reactive to light.   Neck:      Musculoskeletal: Normal range of motion and neck supple.      Thyroid: No thyromegaly.      Vascular: No JVD.      Trachea: No tracheal deviation.   Cardiovascular:      Rate and Rhythm: Normal rate and regular rhythm.      Heart sounds: Normal heart sounds. No murmur. No friction rub. No gallop.    Pulmonary:      Effort: Pulmonary effort is normal. No respiratory distress.      Breath sounds: Normal breath sounds. No stridor. No wheezing or rales.   Chest:      Chest wall: No tenderness.   Abdominal:      General: Bowel sounds are normal. There is no distension.      Palpations: Abdomen is soft. There is no mass.      Tenderness: There is no tenderness. There is no guarding or rebound.   Musculoskeletal: Normal range of motion.         General: No  tenderness.   Lymphadenopathy:      Cervical: No cervical adenopathy.   Skin:     General: Skin is warm and dry.      Findings: No erythema or rash.   Neurological:      Mental Status: She is alert and oriented to person, place, and time.      Cranial Nerves: No cranial nerve deficit.      Motor: No abnormal muscle tone.      Coordination: Coordination normal.   Psychiatric:         Behavior: Behavior normal.         Thought Content: Thought content normal.         Judgment: Judgment normal.         Fluids    Intake/Output Summary (Last 24 hours) at 11/14/2019 1727  Last data filed at 11/14/2019 0615  Gross per 24 hour   Intake 825 ml   Output --   Net 825 ml       Laboratory  Recent Labs     11/13/19  0300 11/14/19  0444   WBC 17.6* 24.5*   RBC 4.24 5.05   HEMOGLOBIN 9.8* 9.4*   HEMATOCRIT 33.4* 30.5*   MCV 78.8* 78.0*   MCH 23.1* 24.0*   MCHC 29.3* 30.7*   RDW 46.7 46.5   PLATELETCT 545* 432   MPV 10.3 10.4     Recent Labs     11/13/19  0300 11/14/19  0444   SODIUM 140 141   POTASSIUM 3.1* 3.5*   CHLORIDE 99 102   CO2 27 27   GLUCOSE 88 96   BUN 11 9   CREATININE 0.61 0.51   CALCIUM 9.6 9.1                   Imaging  CT-ABDOMEN-PELVIS WITH   Final Result         1.  Mild diffuse colonic wall thickening and adjacent hazy fat stranding, appearance shows mild diffuse colitis.           Assessment/Plan  * Crohn's disease (HCC)- (present on admission)  Assessment & Plan  With current exacerbation or flare.  Patient is a few days out from her next Remicade and injection.   Pain management as needed.  Monitor.  Start tapering of Solu-Medrol.    Microcytic anemia  Assessment & Plan  Suspect due to chronic disease.  No current evidence of bleed.  Monitor.  Transfuse as needed for hemoglobin less than 7 g/Catherine  Anemia work-up ordered.    Hypokalemia- (present on admission)  Assessment & Plan  Low levels due to emesis and diarrhea.  Replacing with K. Dur 40 twice daily    Alcohol use disorder, moderate, in early remission  (HCC)- (present on admission)  Assessment & Plan  Currently in remission.    Crohn's colitis (HCC)- (present on admission)  Assessment & Plan  CT abdomen pelvis with inflammation edema of the colon wall consistent with colitis.  Ruling out infectious causes.  Stool culture negative no growth for 24 hours.  C. difficile pending.  Discontinued Rocephin Flagyl IV.  This is likely Crohn's related.  Start taper IV Solu-Medrol    Major depressive disorder, recurrent, severe without psychotic features (HCC)- (present on admission)  Assessment & Plan  Controlled with current medication regimen which will be continued.  Monitor.       VTE prophylaxis: scds, lovenox

## 2019-11-15 NOTE — DISCHARGE INSTRUCTIONS
Discharge Instructions    Discharged to home by car with relative. Discharged via walking, hospital escort: Refused.  Special equipment needed: Not Applicable    Be sure to schedule a follow-up appointment with your primary care doctor or any specialists as instructed.     Discharge Plan:   Diet Plan: Discussed  Activity Level: Discussed  Confirmed Follow up Appointment: Patient to Call and Schedule Appointment  Confirmed Symptoms Management: Discussed  Medication Reconciliation Updated: Yes  Influenza Vaccine Indication: Indicated: 9 to 64 years of age  Influenza Vaccine Given - only chart on this line when given: Influenza Vaccine Given (See MAR)    I understand that a diet low in cholesterol, fat, and sodium is recommended for good health. Unless I have been given specific instructions below for another diet, I accept this instruction as my diet prescription.   Other diet: Regular Crohn's Disease, Follow Up with GI.    Special Instructions: None    · Is patient discharged on Warfarin / Coumadin?   No Regional enteritis Site-  (Small, large, small and large intestine, unspecified)  Regional enteritis without complication)  Regional enteritis with complication (abscess, fistula, intestinal obstruction, rectal bleeding, unspecified)  Other explanation of clinical findings  Findings of no clinical significance  Unable to determine    Depression / Suicide Risk    As you are discharged from this [facility], it is important to learn how to keep safe from harming yourself.    Recognize the warning signs:  · Abrupt changes in personality, positive or negative- including increase in energy   · Giving away possessions  · Change in eating patterns- significant weight changes-  positive or negative  · Change in sleeping patterns- unable to sleep or sleeping all the time   · Unwillingness or inability to communicate  · Depression  · Unusual sadness, discouragement and loneliness  · Talk of wanting to die  · Neglect  of personal appearance   · Rebelliousness- reckless behavior  · Withdrawal from people/activities they love  · Confusion- inability to concentrate     If you or a loved one observes any of these behaviors or has concerns about self-harm, here's what you can do:  · Talk about it- your feelings and reasons for harming yourself  · Remove any means that you might use to hurt yourself (examples: pills, rope, extension cords, firearm)  · Get professional help from the community (Mental Health, Substance Abuse, psychological counseling)  · Do not be alone:Call your Safe Contact- someone whom you trust who will be there for you.  · Call your local CRISIS HOTLINE 930-2852 or 862-079-0429  · Call your local Children's Mobile Crisis Response Team [place] (047) 664-1142 or www.[employer]  · Call the toll free National Suicide Prevention Hotlines   · National Suicide Prevention Lifeline 863-322-CCTH (2689)  · National Hope Line Network 800-SUICIDE (425-3764)  • Regional enteritis Site-  (Small, large, small and large intestine, unspecified)  • Regional enteritis without complication)  • Regional enteritis with complication (abscess, fistula, intestinal obstruction, rectal bleeding, unspecified)  • Other explanation of clinical findings  • Findings of no clinical significance  • Unable to determine

## 2019-11-15 NOTE — CARE PLAN
Problem: Communication  Goal: The ability to communicate needs accurately and effectively will improve  Outcome: PROGRESSING AS EXPECTED  Note:   Call light within reach; patient using call light appropriately for assistance.      Problem: Safety  Goal: Will remain free from injury  Outcome: PROGRESSING AS EXPECTED  Note:   Safety education provided. Patient encouraged to use call light for assistance as needed.     Problem: Safety  Goal: Will remain free from falls  Outcome: PROGRESSING AS EXPECTED  Note:   Bed locked and in lowest position. Non skid socks in place, call light and belongings within reach. Hourly rounding in place.     Problem: Bowel/Gastric:  Goal: Normal bowel function is maintained or improved  Outcome: PROGRESSING AS EXPECTED  Note:   Patient reporting having 1 BM since start of shift; patient self reporting that frequency of BMs is lessening/ improving. Will continue to monitor.      Problem: Bowel/Gastric:  Goal: Will not experience complications related to bowel motility  Outcome: PROGRESSING AS EXPECTED  Note:   Patient reporting having 1 BM since start of shift; reports abdominal tenderness is improving upon palpation. Will continue to monitor.     Problem: Knowledge Deficit  Goal: Knowledge of disease process/condition, treatment plan, diagnostic tests, and medications will improve  Outcome: PROGRESSING AS EXPECTED  Note:   Patient updated on plan of care; questions and concerns regarding discontinued antibiotics addressed. Patient expressing understanding.      Problem: Pain Management  Goal: Pain level will decrease to patient's comfort goal  Outcome: PROGRESSING AS EXPECTED  Note:   Patient reporting adequate pain relief with current pain med administration; patient resting comfortably in bed, no signs of pain noted. Will continue to monitor.

## 2019-11-15 NOTE — DISCHARGE PLANNING
Care Transition Team Assessment    Information Source  Orientation : Oriented x 4  Information Given By: Patient  Informant's Name: Mi   Who is responsible for making decisions for patient? : Patient         Elopement Risk  Legal Hold: No  Ambulatory or Self Mobile in Wheelchair: Yes  Disoriented: No  Psychiatric Symptoms: None  History of Wandering: No  Elopement this Admit: No  Vocalizing Wanting to Leave: No  Displays Behaviors, Body Language Wanting to Leave: No-Not at Risk for Elopement  Elopement Risk: Not at Risk for Elopement    Interdisciplinary Discharge Planning  Does Admitting Nurse Feel This Could be a Complex Discharge?: No  Primary Care Physician: None  Lives with - Patient's Self Care Capacity: Alone and Able to Care For Self  Patient or legal guardian wants to designate a caregiver (see row info): No  Support Systems: Parent  Do You Take your Prescribed Medications Regularly: Yes  Able to Return to Previous ADL's: Yes  Mobility Issues: No  Prior Services: None  Patient Expects to be Discharged to:: Home    Discharge Preparedness  What is your plan after discharge?: Home with help  What are your discharge supports?: Parent  Prior Functional Level: Independent with Activities of Daily Living, Independent with Medication Management  Difficulity with ADLs: None  Difficulity with IADLs: None    Functional Assesment  Prior Functional Level: Independent with Activities of Daily Living, Independent with Medication Management    Finances  Financial Barriers to Discharge: No  Prescription Coverage: No  Prescription Coverage Comments: CAITLYN for meds this month         Values / Beliefs / Concerns  Values / Beliefs Concerns : No         Domestic Abuse  Have you ever been the victim of abuse or violence?: No  Physical Abuse or Sexual Abuse: No  Verbal Abuse or Emotional Abuse: No  Possible Abuse Reported to:: Not Applicable    Psychological Assessment  History of Psychiatric Problems: No  Non-compliant with  Treatment: No  Newly Diagnosed Illness: No    Discharge Risks or Barriers  Discharge risks or barriers?: No PCP, Medicaid pending    Anticipated Discharge Information  Anticipated discharge disposition: Home  Discharge Address: [address] [place] [address] [address] [facility]  [place]  [id number]  Discharge Contact Phone Number: [phone]

## 2019-11-15 NOTE — ASSESSMENT & PLAN NOTE
CT abdomen pelvis with inflammation edema of the colon wall consistent with colitis.  Ruling out infectious causes.  Stool culture negative no growth for 24 hours.  C. difficile pending.  Discontinued Rocephin Flagyl IV.  This is likely Crohn's related.  Start taper IV Solu-Medrol

## 2019-11-15 NOTE — DISCHARGE SUMMARY
Discharge Summary    CHIEF COMPLAINT ON ADMISSION  Chief Complaint   Patient presents with   • Nausea/Vomiting/Diarrhea     Pt complains of n/v/d for the last month. Pt states she was diagnosed with Crohns disease. Pt states she usually is on humira but has not had it in the last 2 months due to moving back to [place]. Pt states that IV prednisone usually helps with this issue.        Reason for Admission  Vomiting     Admission Date  11/13/2019    CODE STATUS  Full Code    HPI & HOSPITAL COURSE  This is a 39 y.o. female here with N/V/D, abdominal pain.  11/14: This 39-year-old female with history of Crohn's disease previously on Humira, depression, prior alcoholism in remission who has been off of all of her Crohn's disease medications for several months due to a move from [place] to [facility].  She presents with severe nausea vomiting cramping abdominal pain.  CT abdomen pelvis with inflammation of the colon seen consistent with colitis.  Her white count was elevated at 17.  She was started on prednisone 62.5 mg IV daily on admission, started on IV fluids.  I have ordered stool culture which so far no growth x24 hours therefore I discontinued Rocephin and Flagyl IV.  I have also ordered a C. difficile rule out.  I have also discontinued her bowel protocol that was ordered on admission.  She is tolerating a diet at this time therefore I discontinued her IV fluids.  I am also replacing her potassium low at 3.1 with 40 twice daily.  Urine culture also is pending.  Patient overall is feeling better with soft abdomen on palpation.*       The patient continued to improve on prednisone and was tapered to 40mg IV.  Stool culture and Cdificil negative.  She was tolerating a regular diet.  She was sent home with a 2 week prednisone taper, refill of her medications, sent to [facility] pharmacy since she has been unable to afford her medications.  She was set up with a new PCP and GI appt. Since she recently  moved to [place], [place] from [place], [place].   She will need to establish with GI for Humira restart and to follow.    Anemia blood work was ordered, but patient had refused blood work.  Will re attempt blood draw prior to dc.  Leukocytosis from IV steroid.    Therefore, she is discharged in good and stable condition to home with close outpatient follow-up.    The patient met 2-midnight criteria for an inpatient stay at the time of discharge.    Discharge Date  11/15/19    FOLLOW UP ITEMS POST DISCHARGE  Follow up with new PCP in [place].  Needs new GI appt. To follow Crohn's disease, Humira prescribing.    DISCHARGE DIAGNOSES  Principal Problem:    Crohn's disease (HCC) POA: Yes  Active Problems:    Major depressive disorder, recurrent, severe without psychotic features (HCC) POA: Yes    Crohn's colitis (HCC) POA: Yes    Alcohol use disorder, moderate, in early remission (HCC) POA: Yes    Microcytic anemia POA: Yes  Resolved Problems:    Hypokalemia POA: Yes      FOLLOW UP  No future appointments.  No follow-up provider specified.    MEDICATIONS ON DISCHARGE     Medication List      START taking these medications      Instructions   ondansetron 4 MG Tbdp  Commonly known as:  ZOFRAN ODT   Take 1 Tab by mouth every four hours as needed for Nausea (give PO if no IV route available).  Dose:  4 mg     predniSONE 20 MG Tabs  Commonly known as:  DELTASONE   Take 40mg po daily for 7 days, followed by 20mg po daily for 7 days.        CONTINUE taking these medications      Instructions   aripiprazole 5 MG tablet  Commonly known as:  ABILIFY   Take 1 Tab by mouth every day.  Dose:  5 mg     budesonide 3 MG Cpep capsule  Commonly known as:  ENTOCORT EC   Doctor's comments:  Start AFTER completion of prednisone taper.  Take 2 Caps by mouth every morning.  Dose:  6 mg     gabapentin 300 MG Caps  Commonly known as:  NEURONTIN   Take 1 Cap by mouth every day.  Dose:  300 mg     mesalamine extended-release 500 MG capsule  Commonly known  as:  PENTASA   Take 1 Cap by mouth 4 times a day.  Dose:  500 mg     mirtazapine 15 MG Tabs  Commonly known as:  REMERON   Take 0.5 Tabs by mouth every bedtime.  Dose:  7.5 mg     sertraline 50 MG Tabs  Commonly known as:  ZOLOFT   Take 1.5 Tabs by mouth every day.  Dose:  75 mg        STOP taking these medications    HUMIRA PEN 40 MG/0.8ML Pnkt  Generic drug:  Adalimumab     metoclopramide 10 MG Tabs  Commonly known as:  REGLAN            Allergies  Allergies   Allergen Reactions   • Phenergan [Promethazine]      Anxiety     • Seroquel [Quetiapine] Itching   • Trazodone Itching       DIET  Orders Placed This Encounter   Procedures   • Diet Order Regular     Standing Status:   Standing     Number of Occurrences:   1     Order Specific Question:   Diet:     Answer:   Regular [1]       ACTIVITY  As tolerated.  Weight bearing as tolerated    CONSULTATIONS  none    PROCEDURES  11/13/2019 4:10 AM     HISTORY/REASON FOR EXAM: Abd pain, unspecified     TECHNIQUE/EXAM DESCRIPTION:  CT abdomen and pelvis with contrast. Sequential axial CT images were obtained from lung bases to the proximal femurs after the uneventful administration of 100 cc Omnipaque 350 intravenous contrast.     Low dose optimization technique was utilized for this CT exam including automated exposure control and adjustment of the mA and/or kV according to patient size.     COMPARISON:  January 2, 2017     CT ABDOMEN FINDINGS:     Limited views of the lungs appear within physiologic limits.     The liver is normal in contour. The liver is normal in size. No intrahepatic biliary ductal dilatation is noted.     The gallbladder appears within normal limits.     The spleen is unremarkable.     The pancreas is grossly normal.     Bilateral adrenal glands appear within normal limits.     6.6 mm right lateral cyst is seen, otherwise the kidneys are unremarkable.  The ureters are normal in caliber along their visualized course.     There is diffuse thickening of  the colon wall with pericolonic fat stranding. The appendix is incompletely visualized, the visualized portions of the appendix appear grossly unremarkable. The small bowel is unremarkable.     The bony structures are age appropriate.     CT PELVIS FINDINGS:     The bladder is within normal limits. The uterus and adnexal structures appear within physiologic limits. Trace free fluid collection is seen within the pelvis.     IMPRESSION:        1.  Mild diffuse colonic wall thickening and adjacent hazy fat stranding, appearance shows mild diffuse colitis.    LABORATORY  Lab Results   Component Value Date    SODIUM 141 11/14/2019    POTASSIUM 3.5 (L) 11/14/2019    CHLORIDE 102 11/14/2019    CO2 27 11/14/2019    GLUCOSE 96 11/14/2019    BUN 9 11/14/2019    CREATININE 0.51 11/14/2019    CREATININE 0.6 01/09/2007        Lab Results   Component Value Date    WBC 24.5 (H) 11/14/2019    HEMOGLOBIN 9.4 (L) 11/14/2019    HEMATOCRIT 30.5 (L) 11/14/2019    PLATELETCT 432 11/14/2019        Total time of the discharge process exceeds 42 minutes.

## 2019-11-15 NOTE — PROGRESS NOTES
Assumed pt care at 0700. Received bedside report .    Pt Alert and oriented x  4. Pt denies, chest pain, sob, nausea/vomiting, headache, blurry/double vision. Pt denies numbness/tingling. Pt indicates 7/10 abdominal pain, [id number]. Pt ambulating by self. Fully intact, no further concerns from pt expressed. POC discussed and education provided on administered medications. All questions and concerns addressed. Hourly rounding and Q4 hour neuro checks in place.

## 2019-11-16 LAB
BACTERIA STL CULT: NORMAL
SIGNIFICANT IND 70042: NORMAL
SITE SITE: NORMAL
SOURCE SOURCE: NORMAL

## 2019-11-16 NOTE — PROGRESS NOTES
Pt educated on D/C instructions, follow up, medications, and seeking medical attention as necessary. Pt verbalizes understanding, D/c'd via self, declining hospital escort with mother home via personal vehicle, belongings with pt. All lines removed.

## 2020-02-19 ENCOUNTER — HOSPITAL ENCOUNTER (EMERGENCY)
Facility: MEDICAL CENTER | Age: 40
End: 2020-02-19
Attending: EMERGENCY MEDICINE

## 2020-02-19 VITALS
BODY MASS INDEX: 20.62 KG/M2 | DIASTOLIC BLOOD PRESSURE: 72 MMHG | OXYGEN SATURATION: 97 % | WEIGHT: 116.4 LBS | TEMPERATURE: 98.2 F | HEIGHT: 63 IN | RESPIRATION RATE: 16 BRPM | SYSTOLIC BLOOD PRESSURE: 119 MMHG | HEART RATE: 74 BPM

## 2020-02-19 DIAGNOSIS — K50.10 CROHN'S DISEASE OF COLON WITHOUT COMPLICATION (HCC): ICD-10-CM

## 2020-02-19 LAB
ALBUMIN SERPL BCP-MCNC: 3.9 G/DL (ref 3.2–4.9)
ALBUMIN/GLOB SERPL: 0.9 G/DL
ALP SERPL-CCNC: 83 U/L (ref 30–99)
ALT SERPL-CCNC: 6 U/L (ref 2–50)
ANION GAP SERPL CALC-SCNC: 10 MMOL/L (ref 0–11.9)
AST SERPL-CCNC: 10 U/L (ref 12–45)
BASOPHILS # BLD AUTO: 0.1 % (ref 0–1.8)
BASOPHILS # BLD: 0.02 K/UL (ref 0–0.12)
BILIRUB SERPL-MCNC: 0.4 MG/DL (ref 0.1–1.5)
BUN SERPL-MCNC: 7 MG/DL (ref 8–22)
CALCIUM SERPL-MCNC: 9.5 MG/DL (ref 8.5–10.5)
CHLORIDE SERPL-SCNC: 97 MMOL/L (ref 96–112)
CO2 SERPL-SCNC: 26 MMOL/L (ref 20–33)
CREAT SERPL-MCNC: 0.64 MG/DL (ref 0.5–1.4)
EOSINOPHIL # BLD AUTO: 0 K/UL (ref 0–0.51)
EOSINOPHIL NFR BLD: 0 % (ref 0–6.9)
ERYTHROCYTE [DISTWIDTH] IN BLOOD BY AUTOMATED COUNT: 42.2 FL (ref 35.9–50)
GLOBULIN SER CALC-MCNC: 4.3 G/DL (ref 1.9–3.5)
GLUCOSE SERPL-MCNC: 115 MG/DL (ref 65–99)
HCT VFR BLD AUTO: 36.5 % (ref 37–47)
HGB BLD-MCNC: 11.5 G/DL (ref 12–16)
IMM GRANULOCYTES # BLD AUTO: 0.09 K/UL (ref 0–0.11)
IMM GRANULOCYTES NFR BLD AUTO: 0.5 % (ref 0–0.9)
LIPASE SERPL-CCNC: 7 U/L (ref 11–82)
LYMPHOCYTES # BLD AUTO: 0.65 K/UL (ref 1–4.8)
LYMPHOCYTES NFR BLD: 3.7 % (ref 22–41)
MCH RBC QN AUTO: 26.2 PG (ref 27–33)
MCHC RBC AUTO-ENTMCNC: 31.5 G/DL (ref 33.6–35)
MCV RBC AUTO: 83.1 FL (ref 81.4–97.8)
MONOCYTES # BLD AUTO: 0.41 K/UL (ref 0–0.85)
MONOCYTES NFR BLD AUTO: 2.3 % (ref 0–13.4)
NEUTROPHILS # BLD AUTO: 16.58 K/UL (ref 2–7.15)
NEUTROPHILS NFR BLD: 93.4 % (ref 44–72)
NRBC # BLD AUTO: 0 K/UL
NRBC BLD-RTO: 0 /100 WBC
PLATELET # BLD AUTO: 395 K/UL (ref 164–446)
PMV BLD AUTO: 10.7 FL (ref 9–12.9)
POTASSIUM SERPL-SCNC: 2.9 MMOL/L (ref 3.6–5.5)
PROT SERPL-MCNC: 8.2 G/DL (ref 6–8.2)
RBC # BLD AUTO: 4.39 M/UL (ref 4.2–5.4)
SODIUM SERPL-SCNC: 133 MMOL/L (ref 135–145)
WBC # BLD AUTO: 17.8 K/UL (ref 4.8–10.8)

## 2020-02-19 PROCEDURE — 99285 EMERGENCY DEPT VISIT HI MDM: CPT

## 2020-02-19 PROCEDURE — 700111 HCHG RX REV CODE 636 W/ 250 OVERRIDE (IP): Performed by: EMERGENCY MEDICINE

## 2020-02-19 PROCEDURE — 96375 TX/PRO/DX INJ NEW DRUG ADDON: CPT

## 2020-02-19 PROCEDURE — 80053 COMPREHEN METABOLIC PANEL: CPT

## 2020-02-19 PROCEDURE — 83690 ASSAY OF LIPASE: CPT

## 2020-02-19 PROCEDURE — 96374 THER/PROPH/DIAG INJ IV PUSH: CPT

## 2020-02-19 PROCEDURE — A9270 NON-COVERED ITEM OR SERVICE: HCPCS | Performed by: EMERGENCY MEDICINE

## 2020-02-19 PROCEDURE — 700102 HCHG RX REV CODE 250 W/ 637 OVERRIDE(OP): Performed by: EMERGENCY MEDICINE

## 2020-02-19 PROCEDURE — 700105 HCHG RX REV CODE 258: Performed by: EMERGENCY MEDICINE

## 2020-02-19 PROCEDURE — 85025 COMPLETE CBC W/AUTO DIFF WBC: CPT

## 2020-02-19 RX ORDER — ONDANSETRON 2 MG/ML
4 INJECTION INTRAMUSCULAR; INTRAVENOUS ONCE
Status: COMPLETED | OUTPATIENT
Start: 2020-02-19 | End: 2020-02-19

## 2020-02-19 RX ORDER — SODIUM CHLORIDE 9 MG/ML
1000 INJECTION, SOLUTION INTRAVENOUS ONCE
Status: COMPLETED | OUTPATIENT
Start: 2020-02-19 | End: 2020-02-19

## 2020-02-19 RX ORDER — OXYCODONE HYDROCHLORIDE AND ACETAMINOPHEN 5; 325 MG/1; MG/1
1 TABLET ORAL ONCE
Status: COMPLETED | OUTPATIENT
Start: 2020-02-19 | End: 2020-02-19

## 2020-02-19 RX ORDER — PREDNISONE 20 MG/1
60 TABLET ORAL DAILY
Qty: 15 TAB | Refills: 0 | Status: SHIPPED | OUTPATIENT
Start: 2020-02-19 | End: 2020-02-24

## 2020-02-19 RX ADMIN — ONDANSETRON 4 MG: 2 INJECTION INTRAMUSCULAR; INTRAVENOUS at 12:04

## 2020-02-19 RX ADMIN — FENTANYL CITRATE 50 MCG: 0.05 INJECTION, SOLUTION INTRAMUSCULAR; INTRAVENOUS at 12:05

## 2020-02-19 RX ADMIN — OXYCODONE HYDROCHLORIDE AND ACETAMINOPHEN 1 TABLET: 5; 325 TABLET ORAL at 14:16

## 2020-02-19 RX ADMIN — SODIUM CHLORIDE 1000 ML: 9 INJECTION, SOLUTION INTRAVENOUS at 12:06

## 2020-02-19 NOTE — ED NOTES
Hand off report given to Zana GARCES RN   Patient chart and current status reviewed with oncoming RN and all questions answered  This RN's primary care of patient terminated at this time

## 2020-02-19 NOTE — DISCHARGE INSTRUCTIONS
Drink lots of fluids    Return for increased pain    Follow-up with your gastroenterologist in 5 days as scheduled

## 2020-02-19 NOTE — ED NOTES
Noted that patients IV fluids were going in slowly, educated patient on keeping her arm straight and place Fluids in a pressure bag.

## 2020-02-19 NOTE — ED PROVIDER NOTES
ED Provider Note    CHIEF COMPLAINT  Chief Complaint   Patient presents with   • Crohn's Disease     n/v/d for 5 days       HPI  Mi Bay is a 39 y.o. female who presents with abdominal pain.  The patient has a known history of Crohn's disease.  She states that over the last couple week she has not felt well.  Over the last several days she has had progressive increased abdominal pain.  She states the pain is diffusely located and described as cramping.  She is unaware of any exacerbating or relieving factors.  The patient does have associated vomiting and diarrhea.  She has not had any fevers.  She has a history of Crohn's disease and states she is scheduled to see a gastroenterologist.  She stopped prednisone a couple weeks ago and has not been on any other prophylactic medication for her Crohn's disease recently.  She has not noted any blood in her diarrhea.    REVIEW OF SYSTEMS  See HPI for further details. All other systems are negative.     PAST MEDICAL HISTORY  Past Medical History:   Diagnosis Date   • Anxiety    • Crohn's disease (HCC)    • Depression    • Hepatitis C    • IBD (inflammatory bowel disease)    • PNA (pneumonia)        FAMILY HISTORY  [unfilled]    SOCIAL HISTORY  Social History     Socioeconomic History   • Marital status: Single     Spouse name: Not on file   • Number of children: Not on file   • Years of education: Not on file   • Highest education level: Not on file   Occupational History   • Not on file   Social Needs   • Financial resource strain: Not on file   • Food insecurity     Worry: Not on file     Inability: Not on file   • Transportation needs     Medical: Not on file     Non-medical: Not on file   Tobacco Use   • Smoking status: Never Smoker   • Smokeless tobacco: Never Used   Substance and Sexual Activity   • Alcohol use: No     Comment: last use 7/17/2016; was daily   • Drug use: No   • Sexual activity: Never   Lifestyle   • Physical activity     Days per week: Not on  "file     Minutes per session: Not on file   • Stress: Not on file   Relationships   • Social connections     Talks on phone: Not on file     Gets together: Not on file     Attends Yarsanism service: Not on file     Active member of club or organization: Not on file     Attends meetings of clubs or organizations: Not on file     Relationship status: Not on file   • Intimate partner violence     Fear of current or ex partner: Not on file     Emotionally abused: Not on file     Physically abused: Not on file     Forced sexual activity: Not on file   Other Topics Concern   • Not on file   Social History Narrative   • Not on file       SURGICAL HISTORY  Past Surgical History:   Procedure Laterality Date   • COLONOSCOPY WITH BIOPSY N/A 6/21/2016    Procedure: COLONOSCOPY WITH BIOPSY;  Surgeon: Jay Leggett M.D.;  Location: ENDOSCOPY [facility];  Service:        CURRENT MEDICATIONS  Home Medications    **Home medications have not yet been reviewed for this encounter**         ALLERGIES  Allergies   Allergen Reactions   • Phenergan [Promethazine]      Anxiety     • Seroquel [Quetiapine] Itching   • Trazodone Itching       PHYSICAL EXAM  VITAL SIGNS: /88   Pulse 75   Temp 36.8 °C (98.2 °F) (Temporal)   Resp 16   Ht 1.6 m (5' 3\")   Wt 52.8 kg (116 lb 6.5 oz)   SpO2 95%   BMI 20.62 kg/m²       Constitutional: Mild acute distress, Non-toxic appearance.   HENT: Normocephalic, Atraumatic, Bilateral external ears normal, Oropharynx moist, No oral exudates, Nose normal.   Eyes: PERRLA, EOMI, Conjunctiva normal, No discharge.   Neck: Normal range of motion, No tenderness, Supple, No stridor.   Lymphatic: No lymphadenopathy noted.   Cardiovascular: Normal heart rate, Normal rhythm, No murmurs, No rubs, No gallops.   Thorax & Lungs: Normal breath sounds, No respiratory distress, No wheezing, No chest tenderness.   Abdomen: Diffuse discomfort, no rebound, no guarding, normal bowel sounds  Skin: Warm, Dry, No " erythema, No rash.   Back: No tenderness, No CVA tenderness.   Extremities: Intact distal pulses, No edema, No tenderness, No cyanosis, No clubbing.   Neurologic: Alert & oriented x 3, Normal motor function, Normal sensory function, No focal deficits noted.   Psychiatric: Affect normal, Judgment normal, Mood normal.     COURSE & MEDICAL DECISION MAKING  Pertinent Labs & Imaging studies reviewed. (See chart for details)  This is a 39-year-old female who presents to the emergency department with a suspected Crohn's exacerbation.  Clinically she does not have any peritoneal findings nor any significant focal tenderness to support abscess formation.  She did have a CT scan about a month ago therefore did not want a repeat imaging due to the risk of radiation.  She does have a concerning leukocytosis but her white blood cell count appears relatively stable from the past.  I did speak with GI consultants and they recommend starting the patient on 40 mg of prednisone a day and they will see her in the office in 5 days as scheduled.  In the interim if the patient has increased pain or persistent vomiting she will return for repeat examination.    FINAL IMPRESSION  1.  Abdominal pain  2.  Suspect secondary to Crohn's exacerbation    Disposition  The patient will be discharged in stable condition         Electronically signed by: Lex Rocha M.D., 2/19/2020 11:44 AM

## 2020-02-23 ENCOUNTER — HOSPITAL ENCOUNTER (EMERGENCY)
Facility: MEDICAL CENTER | Age: 40
End: 2020-02-23
Attending: EMERGENCY MEDICINE

## 2020-02-23 VITALS
RESPIRATION RATE: 16 BRPM | WEIGHT: 113.1 LBS | DIASTOLIC BLOOD PRESSURE: 83 MMHG | SYSTOLIC BLOOD PRESSURE: 154 MMHG | HEIGHT: 60 IN | HEART RATE: 51 BPM | BODY MASS INDEX: 22.2 KG/M2 | TEMPERATURE: 97.9 F | OXYGEN SATURATION: 97 %

## 2020-02-23 DIAGNOSIS — E87.6 HYPOKALEMIA: ICD-10-CM

## 2020-02-23 DIAGNOSIS — I10 HYPERTENSION, UNSPECIFIED TYPE: ICD-10-CM

## 2020-02-23 DIAGNOSIS — K50.00 CROHN'S DISEASE OF SMALL INTESTINE WITHOUT COMPLICATION (HCC): ICD-10-CM

## 2020-02-23 LAB
ALBUMIN SERPL BCP-MCNC: 3.6 G/DL (ref 3.2–4.9)
ALBUMIN/GLOB SERPL: 0.8 G/DL
ALP SERPL-CCNC: 76 U/L (ref 30–99)
ALT SERPL-CCNC: 10 U/L (ref 2–50)
ANION GAP SERPL CALC-SCNC: 10 MMOL/L (ref 0–11.9)
AST SERPL-CCNC: 14 U/L (ref 12–45)
BASOPHILS # BLD AUTO: 0.1 % (ref 0–1.8)
BASOPHILS # BLD: 0.01 K/UL (ref 0–0.12)
BILIRUB SERPL-MCNC: 0.2 MG/DL (ref 0.1–1.5)
BUN SERPL-MCNC: 18 MG/DL (ref 8–22)
CALCIUM SERPL-MCNC: 9.8 MG/DL (ref 8.5–10.5)
CHLORIDE SERPL-SCNC: 96 MMOL/L (ref 96–112)
CO2 SERPL-SCNC: 33 MMOL/L (ref 20–33)
CREAT SERPL-MCNC: 0.7 MG/DL (ref 0.5–1.4)
EOSINOPHIL # BLD AUTO: 0.03 K/UL (ref 0–0.51)
EOSINOPHIL NFR BLD: 0.2 % (ref 0–6.9)
ERYTHROCYTE [DISTWIDTH] IN BLOOD BY AUTOMATED COUNT: 41.5 FL (ref 35.9–50)
GLOBULIN SER CALC-MCNC: 4.3 G/DL (ref 1.9–3.5)
GLUCOSE SERPL-MCNC: 84 MG/DL (ref 65–99)
HCG SERPL QL: NEGATIVE
HCT VFR BLD AUTO: 38.4 % (ref 37–47)
HGB BLD-MCNC: 12.1 G/DL (ref 12–16)
IMM GRANULOCYTES # BLD AUTO: 0.07 K/UL (ref 0–0.11)
IMM GRANULOCYTES NFR BLD AUTO: 0.5 % (ref 0–0.9)
LYMPHOCYTES # BLD AUTO: 3.06 K/UL (ref 1–4.8)
LYMPHOCYTES NFR BLD: 23 % (ref 22–41)
MCH RBC QN AUTO: 26.1 PG (ref 27–33)
MCHC RBC AUTO-ENTMCNC: 31.5 G/DL (ref 33.6–35)
MCV RBC AUTO: 82.8 FL (ref 81.4–97.8)
MONOCYTES # BLD AUTO: 1.81 K/UL (ref 0–0.85)
MONOCYTES NFR BLD AUTO: 13.6 % (ref 0–13.4)
NEUTROPHILS # BLD AUTO: 8.32 K/UL (ref 2–7.15)
NEUTROPHILS NFR BLD: 62.6 % (ref 44–72)
NRBC # BLD AUTO: 0 K/UL
NRBC BLD-RTO: 0 /100 WBC
PLATELET # BLD AUTO: 402 K/UL (ref 164–446)
PMV BLD AUTO: 10.1 FL (ref 9–12.9)
POTASSIUM SERPL-SCNC: 2.8 MMOL/L (ref 3.6–5.5)
PROT SERPL-MCNC: 7.9 G/DL (ref 6–8.2)
RBC # BLD AUTO: 4.64 M/UL (ref 4.2–5.4)
SODIUM SERPL-SCNC: 139 MMOL/L (ref 135–145)
WBC # BLD AUTO: 13.3 K/UL (ref 4.8–10.8)

## 2020-02-23 PROCEDURE — 700105 HCHG RX REV CODE 258: Performed by: EMERGENCY MEDICINE

## 2020-02-23 PROCEDURE — 99284 EMERGENCY DEPT VISIT MOD MDM: CPT

## 2020-02-23 PROCEDURE — 700102 HCHG RX REV CODE 250 W/ 637 OVERRIDE(OP): Performed by: EMERGENCY MEDICINE

## 2020-02-23 PROCEDURE — 84703 CHORIONIC GONADOTROPIN ASSAY: CPT

## 2020-02-23 PROCEDURE — 96375 TX/PRO/DX INJ NEW DRUG ADDON: CPT

## 2020-02-23 PROCEDURE — 85025 COMPLETE CBC W/AUTO DIFF WBC: CPT

## 2020-02-23 PROCEDURE — 80053 COMPREHEN METABOLIC PANEL: CPT

## 2020-02-23 PROCEDURE — A9270 NON-COVERED ITEM OR SERVICE: HCPCS | Performed by: EMERGENCY MEDICINE

## 2020-02-23 PROCEDURE — 96374 THER/PROPH/DIAG INJ IV PUSH: CPT

## 2020-02-23 PROCEDURE — 700102 HCHG RX REV CODE 250 W/ 637 OVERRIDE(OP)

## 2020-02-23 PROCEDURE — 700111 HCHG RX REV CODE 636 W/ 250 OVERRIDE (IP): Performed by: EMERGENCY MEDICINE

## 2020-02-23 PROCEDURE — A9270 NON-COVERED ITEM OR SERVICE: HCPCS

## 2020-02-23 RX ORDER — ONDANSETRON 2 MG/ML
4 INJECTION INTRAMUSCULAR; INTRAVENOUS ONCE
Status: COMPLETED | OUTPATIENT
Start: 2020-02-23 | End: 2020-02-23

## 2020-02-23 RX ORDER — SODIUM CHLORIDE 9 MG/ML
1000 INJECTION, SOLUTION INTRAVENOUS ONCE
Status: COMPLETED | OUTPATIENT
Start: 2020-02-23 | End: 2020-02-23

## 2020-02-23 RX ORDER — MORPHINE SULFATE 4 MG/ML
4 INJECTION, SOLUTION INTRAMUSCULAR; INTRAVENOUS ONCE
Status: COMPLETED | OUTPATIENT
Start: 2020-02-23 | End: 2020-02-23

## 2020-02-23 RX ORDER — SODIUM CHLORIDE AND POTASSIUM CHLORIDE 150; 900 MG/100ML; MG/100ML
INJECTION, SOLUTION INTRAVENOUS ONCE
Status: DISCONTINUED | OUTPATIENT
Start: 2020-02-23 | End: 2020-02-23 | Stop reason: HOSPADM

## 2020-02-23 RX ORDER — HYDROCODONE BITARTRATE AND ACETAMINOPHEN 5; 325 MG/1; MG/1
1 TABLET ORAL ONCE
Status: COMPLETED | OUTPATIENT
Start: 2020-02-23 | End: 2020-02-23

## 2020-02-23 RX ORDER — POTASSIUM CHLORIDE 20 MEQ/1
20 TABLET, EXTENDED RELEASE ORAL ONCE
Status: COMPLETED | OUTPATIENT
Start: 2020-02-23 | End: 2020-02-23

## 2020-02-23 RX ADMIN — POTASSIUM CHLORIDE 20 MEQ: 1500 TABLET, EXTENDED RELEASE ORAL at 04:34

## 2020-02-23 RX ADMIN — HYDROCODONE BITARTRATE AND ACETAMINOPHEN 1 TABLET: 5; 325 TABLET ORAL at 04:47

## 2020-02-23 RX ADMIN — SODIUM CHLORIDE 1000 ML: 9 INJECTION, SOLUTION INTRAVENOUS at 03:41

## 2020-02-23 RX ADMIN — ONDANSETRON 4 MG: 2 INJECTION INTRAMUSCULAR; INTRAVENOUS at 03:41

## 2020-02-23 RX ADMIN — MORPHINE SULFATE 4 MG: 4 INJECTION INTRAVENOUS at 03:41

## 2020-02-23 ASSESSMENT — LIFESTYLE VARIABLES
EVER FELT BAD OR GUILTY ABOUT YOUR DRINKING: NO
HAVE YOU EVER FELT YOU SHOULD CUT DOWN ON YOUR DRINKING: NO
TOTAL SCORE: 0
HAVE PEOPLE ANNOYED YOU BY CRITICIZING YOUR DRINKING: NO
TOTAL SCORE: 0
EVER HAD A DRINK FIRST THING IN THE MORNING TO STEADY YOUR NERVES TO GET RID OF A HANGOVER: NO
DO YOU DRINK ALCOHOL: NO
TOTAL SCORE: 0
CONSUMPTION TOTAL: INCOMPLETE

## 2020-02-23 NOTE — ED PROVIDER NOTES
"ED Provider Note    Scribed for Parrish Alberto M.D. by Jyoti Chester. 2/23/2020, 2:45 AM.    Primary care provider: None  Means of arrival: Walk-in  History obtained from: Patient  History limited by: None    CHIEF COMPLAINT  Chief Complaint   Patient presents with   • Nausea/Vomiting/Diarrhea     Pt has history of Chrohns disease and she beleives that she is having a \"flare up\" Pt was evaluated here 4 days ago for same given steroids which she says she has been taking as ordered.     HPI  Mi Bay is a 39 y.o. female with a history of Chron's disease who presents to the Emergency Department for evaluation of nausea, vomiting, and diarrhea. The patient states that she is having a \"flare up.\"  The patient's abdominal pain is poorly localized. She reports that she was unable to keep fluids down which prompted her to come to the ED. She denies any associated fever or chills. Patient was evaluated here in the ED 4 days ago for the same and was given prednisone. She reports that she has an appointment with [facility] tomorrow.     REVIEW OF SYSTEMS  See HPI for further details. All other systems are negative.     PAST MEDICAL HISTORY   has a past medical history of Anxiety, Crohn's disease (HCC), Depression, Hepatitis C, IBD (inflammatory bowel disease), and PNA (pneumonia).    SURGICAL HISTORY   has a past surgical history that includes colonoscopy with biopsy (N/A, 6/21/2016).    SOCIAL HISTORY  Social History     Tobacco Use   • Smoking status: Never Smoker   • Smokeless tobacco: Never Used   Substance Use Topics   • Alcohol use: No     Comment: last use 7/17/2016; was daily   • Drug use: No      Social History     Substance and Sexual Activity   Drug Use No     FAMILY HISTORY  Family History   Problem Relation Age of Onset   • Thyroid Mother    • Bipolar disorder Father         father possibly bipolar   • Diabetes Maternal Grandmother      CURRENT MEDICATIONS  Reviewed.  See Encounter Summary. "     ALLERGIES  Allergies   Allergen Reactions   • Phenergan [Promethazine]      Anxiety     • Seroquel [Quetiapine] Itching   • Trazodone Itching     PHYSICAL EXAM  VITAL SIGNS: BP (!) 176/89   Pulse (!) 54   Temp 36.6 °C (97.9 °F) (Temporal)   Resp 16   Ht 1.524 m (5')   Wt 51.3 kg (113 lb 1.5 oz)   LMP 02/23/2020   SpO2 98%   BMI 22.09 kg/m²   Constitutional: Alert in no apparent distress.  HENT: No signs of trauma, Bilateral external ears normal, Nose normal.   Eyes: Pupils are equal and reactive, Conjunctiva normal, Non-icteric.   Neck: Normal range of motion, No tenderness, Supple, No stridor.   Cardiovascular: Regular rate and rhythm, no murmurs.   Thorax & Lungs: Normal breath sounds, No respiratory distress, No wheezing, No chest tenderness.   Abdomen: Bowel sounds normal, Soft, No tenderness, No masses, No pulsatile masses. No peritoneal signs.  Skin: Warm, Dry, No erythema, No rash.   Back: No bony tenderness, No CVA tenderness.   Extremities: Intact distal pulses, No edema, No tenderness, No cyanosis  Musculoskeletal: Good range of motion in all major joints. No tenderness to palpation or major deformities noted.   Neurologic: Alert , Normal motor function, Normal sensory function, No focal deficits noted.   Psychiatric: Affect normal, Judgment normal, Mood normal.     DIAGNOSTIC STUDIES / PROCEDURES     LABS  Results for orders placed or performed during the hospital encounter of 02/23/20   CBC WITH DIFFERENTIAL   Result Value Ref Range    WBC 13.3 (H) 4.8 - 10.8 K/uL    RBC 4.64 4.20 - 5.40 M/uL    Hemoglobin 12.1 12.0 - 16.0 g/dL    Hematocrit 38.4 37.0 - 47.0 %    MCV 82.8 81.4 - 97.8 fL    MCH 26.1 (L) 27.0 - 33.0 pg    MCHC 31.5 (L) 33.6 - 35.0 g/dL    RDW 41.5 35.9 - 50.0 fL    Platelet Count 402 164 - 446 K/uL    MPV 10.1 9.0 - 12.9 fL    Neutrophils-Polys 62.60 44.00 - 72.00 %    Lymphocytes 23.00 22.00 - 41.00 %    Monocytes 13.60 (H) 0.00 - 13.40 %    Eosinophils 0.20 0.00 - 6.90 %     Basophils 0.10 0.00 - 1.80 %    Immature Granulocytes 0.50 0.00 - 0.90 %    Nucleated RBC 0.00 /100 WBC    Neutrophils (Absolute) 8.32 (H) 2.00 - 7.15 K/uL    Lymphs (Absolute) 3.06 1.00 - 4.80 K/uL    Monos (Absolute) 1.81 (H) 0.00 - 0.85 K/uL    Eos (Absolute) 0.03 0.00 - 0.51 K/uL    Baso (Absolute) 0.01 0.00 - 0.12 K/uL    Immature Granulocytes (abs) 0.07 0.00 - 0.11 K/uL    NRBC (Absolute) 0.00 K/uL   COMP METABOLIC PANEL   Result Value Ref Range    Sodium 139 135 - 145 mmol/L    Potassium 2.8 (L) 3.6 - 5.5 mmol/L    Chloride 96 96 - 112 mmol/L    Co2 33 20 - 33 mmol/L    Anion Gap 10.0 0.0 - 11.9    Glucose 84 65 - 99 mg/dL    Bun 18 8 - 22 mg/dL    Creatinine 0.70 0.50 - 1.40 mg/dL    Calcium 9.8 8.5 - 10.5 mg/dL    AST(SGOT) 14 12 - 45 U/L    ALT(SGPT) 10 2 - 50 U/L    Alkaline Phosphatase 76 30 - 99 U/L    Total Bilirubin 0.2 0.1 - 1.5 mg/dL    Albumin 3.6 3.2 - 4.9 g/dL    Total Protein 7.9 6.0 - 8.2 g/dL    Globulin 4.3 (H) 1.9 - 3.5 g/dL    A-G Ratio 0.8 g/dL   HCG Qual Serum   Result Value Ref Range    Beta-Hcg Qualitative Serum Negative Negative   ESTIMATED GFR   Result Value Ref Range    GFR If African American >60 >60 mL/min/1.73 m 2    GFR If Non African American >60 >60 mL/min/1.73 m 2     All labs were reviewed by me.    COURSE & MEDICAL DECISION MAKING  Pertinent Labs & Imaging studies reviewed. (See chart for details)    2:45 AM - Patient seen and examined at bedside. Patient will be treated with NS infusion 1,000 mL, Morphine 4 g MG/mL injection, and Zofran 4 mg injection. Ordered CBC with differential, CMP, and HCG Qual Sereum to evaluate her symptoms.     4:11 AM - Patient will be medicated with Kdur 20 mEq and 0.9 % KCl 20 mEq.    4:35 AM - Patient was reevaluated at bedside. I instructed her to keep her appointment with GI tomorrow.     HYDRATION: Based on the patient's presentation of Abnormal Fluid Loss the patient was given IV fluids. IV Hydration was used because oral hydration was not  adequate alone. Upon recheck following hydration, the patient was improved.      Decision Making:  This is a 39 y.o. year old female who presents with persistent abdominal pain with known history of Crohn's disease.  Recently here with similar presentation.  She actually has follow-up with GI within the next 24 to 36 hours.  Repeat laboratory evaluation is unremarkable.  She has have improved leukocytosis compared to prior.  She does have a slight decline in her potassium and this was repleted both IV and orally.  She will continue with her steroid taper and follow-up on Monday with GI as currently planned.  At this point to share decision-making we have again wish to defer CT imaging given that this is most likely due to her underlying Crohn's disease.    The patient will return for new or worsening symptoms and is stable at the time of discharge.    The patient is referred to a primary physician for blood pressure management, diabetic screening, and for all other preventative health concerns.    DISPOSITION:  Patient will be discharged home in stable condition.    FOLLOW UP:  JUAN ApodacaRBernyNBerny  [address] [facility] [address]  [phone]      f/u with [facility] on monday as plannd      OUTPATIENT MEDICATIONS:  Discharge Medication List as of 2/23/2020  4:42 AM          FINAL IMPRESSION  1. Crohn's disease of small intestine without complication (HCC)    2. Hypokalemia    3. Hypertension, unspecified type          I, Jyoti Chester (Hernan), am scribing for, and in the presence of, Parrish Alberto M.D..    Electronically signed by: Jyoti Bravo), 2/23/2020    IParrish M.D. personally performed the services described in this documentation, as scribed by Jyoti Chester in my presence, and it is both accurate and complete.     C.     The note accurately reflects work and decisions made by me.  Parrish Alberto M.D.  2/23/2020  6:04 AM

## 2020-02-23 NOTE — ED NOTES
Patient verbalized understanding of discharge instructions, provided with discharge paperwork, gait steady, ambulated independently to EDMAR ramirez.

## 2020-02-23 NOTE — ED TRIAGE NOTES
"Mihoracio Bay  Chief Complaint   Patient presents with   • Nausea/Vomiting/Diarrhea     Pt has history of Chrohns disease and she beleives that she is having a \"flare up\" Pt was evaluated here 4 days ago for same given steroids which she says she has been taking as ordered.     Pt ambulatory to triage with above complaint.     BP (!) 171/112   Pulse 82   Temp 36.6 °C (97.9 °F) (Temporal)   Resp 16   Ht 1.524 m (5')   Wt 51.3 kg (113 lb 1.5 oz)   LMP 02/23/2020   SpO2 99%   BMI 22.09 kg/m²     Pt informed of triage process and encouraged to notify staff of any changes or concerns. Pt verbalized understanding of instructions. Apologized for long wait time. Pt placed back in lobby.     "

## 2022-09-27 ENCOUNTER — HOSPITAL ENCOUNTER (OUTPATIENT)
Dept: LAB | Facility: MEDICAL CENTER | Age: 42
End: 2022-09-27
Attending: FAMILY MEDICINE
Payer: COMMERCIAL

## 2022-09-27 LAB
ALBUMIN SERPL BCP-MCNC: 4.5 G/DL (ref 3.2–4.9)
ALBUMIN/GLOB SERPL: 1.2 G/DL
ALP SERPL-CCNC: 68 U/L (ref 30–99)
ALT SERPL-CCNC: 18 U/L (ref 2–50)
ANION GAP SERPL CALC-SCNC: 11 MMOL/L (ref 7–16)
AST SERPL-CCNC: 21 U/L (ref 12–45)
BASOPHILS # BLD AUTO: 0.3 % (ref 0–1.8)
BASOPHILS # BLD: 0.03 K/UL (ref 0–0.12)
BILIRUB SERPL-MCNC: 0.3 MG/DL (ref 0.1–1.5)
BUN SERPL-MCNC: 6 MG/DL (ref 8–22)
CALCIUM SERPL-MCNC: 9.8 MG/DL (ref 8.5–10.5)
CHLORIDE SERPL-SCNC: 104 MMOL/L (ref 96–112)
CO2 SERPL-SCNC: 21 MMOL/L (ref 20–33)
CREAT SERPL-MCNC: 0.6 MG/DL (ref 0.5–1.4)
EOSINOPHIL # BLD AUTO: 0.18 K/UL (ref 0–0.51)
EOSINOPHIL NFR BLD: 2 % (ref 0–6.9)
ERYTHROCYTE [DISTWIDTH] IN BLOOD BY AUTOMATED COUNT: 42.7 FL (ref 35.9–50)
GFR SERPLBLD CREATININE-BSD FMLA CKD-EPI: 115 ML/MIN/1.73 M 2
GLOBULIN SER CALC-MCNC: 3.9 G/DL (ref 1.9–3.5)
GLUCOSE SERPL-MCNC: 73 MG/DL (ref 65–99)
HCT VFR BLD AUTO: 42.2 % (ref 37–47)
HGB BLD-MCNC: 14 G/DL (ref 12–16)
IMM GRANULOCYTES # BLD AUTO: 0.03 K/UL (ref 0–0.11)
IMM GRANULOCYTES NFR BLD AUTO: 0.3 % (ref 0–0.9)
LYMPHOCYTES # BLD AUTO: 2.1 K/UL (ref 1–4.8)
LYMPHOCYTES NFR BLD: 23.1 % (ref 22–41)
MCH RBC QN AUTO: 30.2 PG (ref 27–33)
MCHC RBC AUTO-ENTMCNC: 33.2 G/DL (ref 33.6–35)
MCV RBC AUTO: 91.1 FL (ref 81.4–97.8)
MONOCYTES # BLD AUTO: 0.69 K/UL (ref 0–0.85)
MONOCYTES NFR BLD AUTO: 7.6 % (ref 0–13.4)
NEUTROPHILS # BLD AUTO: 6.05 K/UL (ref 2–7.15)
NEUTROPHILS NFR BLD: 66.7 % (ref 44–72)
NRBC # BLD AUTO: 0 K/UL
NRBC BLD-RTO: 0 /100 WBC
PLATELET # BLD AUTO: 256 K/UL (ref 164–446)
PMV BLD AUTO: 12.4 FL (ref 9–12.9)
POTASSIUM SERPL-SCNC: 3.9 MMOL/L (ref 3.6–5.5)
PROT SERPL-MCNC: 8.4 G/DL (ref 6–8.2)
RBC # BLD AUTO: 4.63 M/UL (ref 4.2–5.4)
SODIUM SERPL-SCNC: 136 MMOL/L (ref 135–145)
WBC # BLD AUTO: 9.1 K/UL (ref 4.8–10.8)

## 2022-09-27 PROCEDURE — 80145 DRUG ASSAY ADALIMUMAB: CPT

## 2022-09-27 PROCEDURE — 82397 CHEMILUMINESCENT ASSAY: CPT

## 2022-09-27 PROCEDURE — 85025 COMPLETE CBC W/AUTO DIFF WBC: CPT

## 2022-09-27 PROCEDURE — 36415 COLL VENOUS BLD VENIPUNCTURE: CPT

## 2022-09-27 PROCEDURE — 80053 COMPREHEN METABOLIC PANEL: CPT

## 2022-09-29 ENCOUNTER — HOSPITAL ENCOUNTER (OUTPATIENT)
Facility: MEDICAL CENTER | Age: 42
End: 2022-09-29
Attending: INTERNAL MEDICINE
Payer: COMMERCIAL

## 2022-09-29 LAB
ADALIMUMAB NEUTRALIZING ANTIBODY Q5081: NOT DETECTED
ADALIMUMAB SERPL-MCNC: 1.89 UG/ML
PATHOLOGY STUDY: NORMAL

## 2022-09-29 PROCEDURE — 83993 ASSAY FOR CALPROTECTIN FECAL: CPT

## 2022-10-07 LAB — CALPROTECTIN STL-MCNT: 230 UG/G

## 2022-10-20 ENCOUNTER — APPOINTMENT (OUTPATIENT)
Dept: RADIOLOGY | Facility: MEDICAL CENTER | Age: 42
End: 2022-10-20
Attending: EMERGENCY MEDICINE
Payer: COMMERCIAL

## 2022-10-20 ENCOUNTER — HOSPITAL ENCOUNTER (EMERGENCY)
Facility: MEDICAL CENTER | Age: 42
End: 2022-10-20
Attending: EMERGENCY MEDICINE
Payer: COMMERCIAL

## 2022-10-20 VITALS
SYSTOLIC BLOOD PRESSURE: 134 MMHG | DIASTOLIC BLOOD PRESSURE: 78 MMHG | HEART RATE: 95 BPM | BODY MASS INDEX: 26.58 KG/M2 | TEMPERATURE: 98.2 F | WEIGHT: 135.36 LBS | RESPIRATION RATE: 16 BRPM | OXYGEN SATURATION: 95 % | HEIGHT: 60 IN

## 2022-10-20 DIAGNOSIS — E87.6 HYPOKALEMIA: ICD-10-CM

## 2022-10-20 DIAGNOSIS — K50.90 CROHN'S DISEASE WITHOUT COMPLICATION, UNSPECIFIED GASTROINTESTINAL TRACT LOCATION (HCC): ICD-10-CM

## 2022-10-20 LAB
ALBUMIN SERPL BCP-MCNC: 4.3 G/DL (ref 3.2–4.9)
ALBUMIN/GLOB SERPL: 1 G/DL
ALP SERPL-CCNC: 86 U/L (ref 30–99)
ALT SERPL-CCNC: 10 U/L (ref 2–50)
ANION GAP SERPL CALC-SCNC: 14 MMOL/L (ref 7–16)
APPEARANCE UR: ABNORMAL
AST SERPL-CCNC: 14 U/L (ref 12–45)
BACTERIA #/AREA URNS HPF: NEGATIVE /HPF
BASOPHILS # BLD AUTO: 0.3 % (ref 0–1.8)
BASOPHILS # BLD: 0.03 K/UL (ref 0–0.12)
BILIRUB SERPL-MCNC: 0.3 MG/DL (ref 0.1–1.5)
BILIRUB UR QL STRIP.AUTO: NEGATIVE
BUN SERPL-MCNC: 9 MG/DL (ref 8–22)
CALCIUM SERPL-MCNC: 9.7 MG/DL (ref 8.5–10.5)
CHLORIDE SERPL-SCNC: 102 MMOL/L (ref 96–112)
CO2 SERPL-SCNC: 22 MMOL/L (ref 20–33)
COLOR UR: YELLOW
CREAT SERPL-MCNC: 0.75 MG/DL (ref 0.5–1.4)
EOSINOPHIL # BLD AUTO: 0.2 K/UL (ref 0–0.51)
EOSINOPHIL NFR BLD: 1.8 % (ref 0–6.9)
EPI CELLS #/AREA URNS HPF: ABNORMAL /HPF
ERYTHROCYTE [DISTWIDTH] IN BLOOD BY AUTOMATED COUNT: 39.7 FL (ref 35.9–50)
GFR SERPLBLD CREATININE-BSD FMLA CKD-EPI: 102 ML/MIN/1.73 M 2
GLOBULIN SER CALC-MCNC: 4.3 G/DL (ref 1.9–3.5)
GLUCOSE SERPL-MCNC: 96 MG/DL (ref 65–99)
GLUCOSE UR STRIP.AUTO-MCNC: NEGATIVE MG/DL
HCG SERPL QL: NEGATIVE
HCT VFR BLD AUTO: 39.5 % (ref 37–47)
HGB BLD-MCNC: 13.2 G/DL (ref 12–16)
HYALINE CASTS #/AREA URNS LPF: >10 /LPF
IMM GRANULOCYTES # BLD AUTO: 0.05 K/UL (ref 0–0.11)
IMM GRANULOCYTES NFR BLD AUTO: 0.4 % (ref 0–0.9)
KETONES UR STRIP.AUTO-MCNC: 15 MG/DL
LEUKOCYTE ESTERASE UR QL STRIP.AUTO: ABNORMAL
LIPASE SERPL-CCNC: 24 U/L (ref 11–82)
LYMPHOCYTES # BLD AUTO: 1.76 K/UL (ref 1–4.8)
LYMPHOCYTES NFR BLD: 15.6 % (ref 22–41)
MCH RBC QN AUTO: 29.7 PG (ref 27–33)
MCHC RBC AUTO-ENTMCNC: 33.4 G/DL (ref 33.6–35)
MCV RBC AUTO: 88.8 FL (ref 81.4–97.8)
MICRO URNS: ABNORMAL
MONOCYTES # BLD AUTO: 1.13 K/UL (ref 0–0.85)
MONOCYTES NFR BLD AUTO: 10 % (ref 0–13.4)
NEUTROPHILS # BLD AUTO: 8.1 K/UL (ref 2–7.15)
NEUTROPHILS NFR BLD: 71.9 % (ref 44–72)
NITRITE UR QL STRIP.AUTO: NEGATIVE
NRBC # BLD AUTO: 0 K/UL
NRBC BLD-RTO: 0 /100 WBC
PH UR STRIP.AUTO: 5 [PH] (ref 5–8)
PLATELET # BLD AUTO: 414 K/UL (ref 164–446)
PMV BLD AUTO: 10.2 FL (ref 9–12.9)
POTASSIUM SERPL-SCNC: 3 MMOL/L (ref 3.6–5.5)
PROT SERPL-MCNC: 8.6 G/DL (ref 6–8.2)
PROT UR QL STRIP: 100 MG/DL
RBC # BLD AUTO: 4.45 M/UL (ref 4.2–5.4)
RBC # URNS HPF: ABNORMAL /HPF
RBC UR QL AUTO: ABNORMAL
RENAL EPI CELLS #/AREA URNS HPF: ABNORMAL /HPF
SODIUM SERPL-SCNC: 138 MMOL/L (ref 135–145)
SP GR UR STRIP.AUTO: 1.04
UROBILINOGEN UR STRIP.AUTO-MCNC: 0.2 MG/DL
WBC # BLD AUTO: 11.3 K/UL (ref 4.8–10.8)
WBC #/AREA URNS HPF: ABNORMAL /HPF

## 2022-10-20 PROCEDURE — 96375 TX/PRO/DX INJ NEW DRUG ADDON: CPT

## 2022-10-20 PROCEDURE — 87086 URINE CULTURE/COLONY COUNT: CPT

## 2022-10-20 PROCEDURE — 700117 HCHG RX CONTRAST REV CODE 255: Performed by: EMERGENCY MEDICINE

## 2022-10-20 PROCEDURE — 85025 COMPLETE CBC W/AUTO DIFF WBC: CPT

## 2022-10-20 PROCEDURE — 83690 ASSAY OF LIPASE: CPT

## 2022-10-20 PROCEDURE — 700111 HCHG RX REV CODE 636 W/ 250 OVERRIDE (IP): Performed by: EMERGENCY MEDICINE

## 2022-10-20 PROCEDURE — 74177 CT ABD & PELVIS W/CONTRAST: CPT

## 2022-10-20 PROCEDURE — 96365 THER/PROPH/DIAG IV INF INIT: CPT

## 2022-10-20 PROCEDURE — 700105 HCHG RX REV CODE 258: Performed by: EMERGENCY MEDICINE

## 2022-10-20 PROCEDURE — 84703 CHORIONIC GONADOTROPIN ASSAY: CPT

## 2022-10-20 PROCEDURE — 81001 URINALYSIS AUTO W/SCOPE: CPT

## 2022-10-20 PROCEDURE — 96376 TX/PRO/DX INJ SAME DRUG ADON: CPT

## 2022-10-20 PROCEDURE — 36415 COLL VENOUS BLD VENIPUNCTURE: CPT

## 2022-10-20 PROCEDURE — 99285 EMERGENCY DEPT VISIT HI MDM: CPT

## 2022-10-20 PROCEDURE — 80053 COMPREHEN METABOLIC PANEL: CPT

## 2022-10-20 RX ORDER — PREDNISONE 10 MG/1
TABLET ORAL
Qty: 32 TABLET | Refills: 0 | Status: ON HOLD | OUTPATIENT
Start: 2022-10-20 | End: 2022-11-07

## 2022-10-20 RX ORDER — ONDANSETRON 2 MG/ML
4 INJECTION INTRAMUSCULAR; INTRAVENOUS ONCE
Status: COMPLETED | OUTPATIENT
Start: 2022-10-20 | End: 2022-10-20

## 2022-10-20 RX ORDER — POTASSIUM CHLORIDE 7.45 MG/ML
10 INJECTION INTRAVENOUS ONCE
Status: COMPLETED | OUTPATIENT
Start: 2022-10-20 | End: 2022-10-20

## 2022-10-20 RX ORDER — MORPHINE SULFATE 4 MG/ML
4 INJECTION INTRAVENOUS ONCE
Status: COMPLETED | OUTPATIENT
Start: 2022-10-20 | End: 2022-10-20

## 2022-10-20 RX ORDER — POTASSIUM CHLORIDE 20 MEQ/1
20 TABLET, EXTENDED RELEASE ORAL 2 TIMES DAILY
Qty: 30 TABLET | Refills: 0 | Status: ON HOLD | OUTPATIENT
Start: 2022-10-20 | End: 2022-11-07

## 2022-10-20 RX ORDER — SODIUM CHLORIDE 9 MG/ML
1000 INJECTION, SOLUTION INTRAVENOUS ONCE
Status: COMPLETED | OUTPATIENT
Start: 2022-10-20 | End: 2022-10-20

## 2022-10-20 RX ADMIN — MORPHINE SULFATE 4 MG: 4 INJECTION INTRAVENOUS at 07:57

## 2022-10-20 RX ADMIN — MORPHINE SULFATE 4 MG: 4 INJECTION INTRAVENOUS at 06:20

## 2022-10-20 RX ADMIN — POTASSIUM CHLORIDE 10 MEQ: 7.46 INJECTION, SOLUTION INTRAVENOUS at 06:14

## 2022-10-20 RX ADMIN — SODIUM CHLORIDE 1000 ML: 9 INJECTION, SOLUTION INTRAVENOUS at 06:11

## 2022-10-20 RX ADMIN — ONDANSETRON HYDROCHLORIDE 4 MG: 2 SOLUTION INTRAMUSCULAR; INTRAVENOUS at 07:57

## 2022-10-20 RX ADMIN — ONDANSETRON HYDROCHLORIDE 4 MG: 2 SOLUTION INTRAMUSCULAR; INTRAVENOUS at 06:12

## 2022-10-20 RX ADMIN — IOHEXOL 100 ML: 350 INJECTION, SOLUTION INTRAVENOUS at 07:57

## 2022-10-20 ASSESSMENT — FIBROSIS 4 INDEX: FIB4 SCORE: 0.81

## 2022-10-20 NOTE — ED NOTES
US PIV placed. Pt medicated per MAR. Notified ERP of patient's abdominal pain, new orders placed for morphine. Pt medicated for abdominal pain and nausea per MAR. NS bolus running per MAR.

## 2022-10-20 NOTE — ED NOTES
Pt given discharge instructions, script sent to pharmacy, taking lyft ride home, pt ambulating with steady gait.

## 2022-10-20 NOTE — ED TRIAGE NOTES
Mi Bay  42 y.o. female    Chief Complaint   Patient presents with    Vomiting    Diarrhea       Pt arrives with complaints of vomiting and diarrhea that began 4 days prior. Pt has hx of Chron's disease- states she feels this is a flare up. Pt states last flare up was over year prior.    Denies fevers. Endorses generalized abdominal pain. Denies URI symptoms.  Vitals:    10/20/22 0127   BP: (!) 167/113   Pulse: (!) 117   Resp: 18   Temp: 36.2 °C (97.2 °F)   SpO2: 95%       Triage process explained to patient, apologized for wait time, and returned to lobby.  Pt informed to notify staff of any change in condition.

## 2022-10-20 NOTE — ED PROVIDER NOTES
ED Provider Note    CHIEF COMPLAINT  Chief Complaint   Patient presents with    Vomiting    Diarrhea         HPI  Mi Bay is a 42 y.o. female who presents with abdominal pain vomiting and diarrhea.  Patient has a history of Crohn's disease followed by Dr. Devries.  She was being treated with Humira.  There was a problem with pharmacy.  She did not receive usual marrow for about 3 weeks.  About a week and a half ago started having abdominal pain with vomiting and diarrhea.  This is gradually gotten worse.  Unable to keep down any solids over the last week.  Has been taking some clear liquids and Jell-O but is not sure of its enough.  Her abdominal pain is gradually been progressive.  She was able to get her Humira shot 2 days ago but her symptoms have not improved.  She has not had a fever or chills.  No chest pain shortness of breath.  Denies dysuria hematuria frequency.    REVIEW OF SYSTEMS  As per HPI  All other systems are negative.     PAST MEDICAL HISTORY  Past Medical History:   Diagnosis Date    Anxiety     Crohn's disease (HCC)     Depression     Hepatitis C     IBD (inflammatory bowel disease)     PNA (pneumonia)        FAMILY HISTORY  Family History   Problem Relation Age of Onset    Thyroid Mother     Bipolar disorder Father         father possibly bipolar    Diabetes Maternal Grandmother        SOCIAL HISTORY  Social History     Tobacco Use    Smoking status: Never    Smokeless tobacco: Never   Substance Use Topics    Alcohol use: No     Comment: last use 7/17/2016; was daily    Drug use: No       SURGICAL HISTORY  Past Surgical History:   Procedure Laterality Date    COLONOSCOPY WITH BIOPSY N/A 6/21/2016    Procedure: COLONOSCOPY WITH BIOPSY;  Surgeon: Jay Leggett M.D.;  Location: ENDOSCOPY [facility];  Service:        CURRENT MEDICATIONS  Home Medications       Reviewed by Oly Morales R.N. (Registered Nurse) on 10/20/22 at 0136  Med List Status: Not Addressed     Medication  Last Dose Status   aripiprazole (ABILIFY) 5 MG tablet  Active   budesonide (ENTOCORT EC) 3 MG Cap DR Particles capsule  Active   gabapentin (NEURONTIN) 300 MG Cap  Active   mesalamine extended-release (PENTASA) 500 MG capsule  Active   mirtazapine (REMERON) 15 MG Tab  Active   ondansetron (ZOFRAN ODT) 4 MG TABLET DISPERSIBLE  Active   sertraline (ZOLOFT) 50 MG Tab  Active                    ALLERGIES  Allergies   Allergen Reactions    Phenergan [Promethazine]      Anxiety      Seroquel [Quetiapine] Itching    Trazodone Itching       PHYSICAL EXAM  VITAL SIGNS: BP (!) 172/106   Pulse 89   Temp 36.9 °C (98.4 °F) (Temporal)   Resp 20   Ht 1.524 m (5')   Wt 61.4 kg (135 lb 5.8 oz)   LMP 10/03/2022 (Exact Date)   SpO2 96%   BMI 26.44 kg/m²   Constitutional: Awake and alert  HENT: Dry mucous membranes  Eyes: Sclera white  Neck: Normal range of motion  Cardiovascular: Tachycardic , Normal rhythm  Thorax & Lungs: Normal breath sounds, No respiratory distress, No wheezing, No chest tenderness.   Abdomen: Diffuse tenderness with increased tenderness in the left side of the abdomen.  No right-sided abdominal tenderness.  No peritonitis.  Skin: No rash.   Back: No tenderness, No CVA tenderness.   Extremities: Intact, symmetric distal pulses, no edema.  Neurologic: Grossly normal    RADIOLOGY/PROCEDURES  CT-ABDOMEN-PELVIS WITH   Final Result      1.  Questionable mild wall thickening in the descending colon which could be related to patient's history of inflammatory bowel disease versus mild infectious colitis. No bowel obstruction.   2.  Focal ill-defined hypoenhancing area in the left kidney is indeterminate although cannot exclude focal pyelonephritis. No hydronephrosis.   3.  Uterine fibroids.         Imaging is interpreted by radiologist    Labs:   Results for orders placed or performed during the hospital encounter of 10/20/22   CBC WITH DIFFERENTIAL   Result Value Ref Range    WBC 11.3 (H) 4.8 - 10.8 K/uL    RBC  4.45 4.20 - 5.40 M/uL    Hemoglobin 13.2 12.0 - 16.0 g/dL    Hematocrit 39.5 37.0 - 47.0 %    MCV 88.8 81.4 - 97.8 fL    MCH 29.7 27.0 - 33.0 pg    MCHC 33.4 (L) 33.6 - 35.0 g/dL    RDW 39.7 35.9 - 50.0 fL    Platelet Count 414 164 - 446 K/uL    MPV 10.2 9.0 - 12.9 fL    Neutrophils-Polys 71.90 44.00 - 72.00 %    Lymphocytes 15.60 (L) 22.00 - 41.00 %    Monocytes 10.00 0.00 - 13.40 %    Eosinophils 1.80 0.00 - 6.90 %    Basophils 0.30 0.00 - 1.80 %    Immature Granulocytes 0.40 0.00 - 0.90 %    Nucleated RBC 0.00 /100 WBC    Neutrophils (Absolute) 8.10 (H) 2.00 - 7.15 K/uL    Lymphs (Absolute) 1.76 1.00 - 4.80 K/uL    Monos (Absolute) 1.13 (H) 0.00 - 0.85 K/uL    Eos (Absolute) 0.20 0.00 - 0.51 K/uL    Baso (Absolute) 0.03 0.00 - 0.12 K/uL    Immature Granulocytes (abs) 0.05 0.00 - 0.11 K/uL    NRBC (Absolute) 0.00 K/uL   COMP METABOLIC PANEL   Result Value Ref Range    Sodium 138 135 - 145 mmol/L    Potassium 3.0 (L) 3.6 - 5.5 mmol/L    Chloride 102 96 - 112 mmol/L    Co2 22 20 - 33 mmol/L    Anion Gap 14.0 7.0 - 16.0    Glucose 96 65 - 99 mg/dL    Bun 9 8 - 22 mg/dL    Creatinine 0.75 0.50 - 1.40 mg/dL    Calcium 9.7 8.5 - 10.5 mg/dL    AST(SGOT) 14 12 - 45 U/L    ALT(SGPT) 10 2 - 50 U/L    Alkaline Phosphatase 86 30 - 99 U/L    Total Bilirubin 0.3 0.1 - 1.5 mg/dL    Albumin 4.3 3.2 - 4.9 g/dL    Total Protein 8.6 (H) 6.0 - 8.2 g/dL    Globulin 4.3 (H) 1.9 - 3.5 g/dL    A-G Ratio 1.0 g/dL   LIPASE   Result Value Ref Range    Lipase 24 11 - 82 U/L   HCG QUAL SERUM   Result Value Ref Range    Beta-Hcg Qualitative Serum Negative Negative   URINALYSIS,CULTURE IF INDICATED    Specimen: Urine   Result Value Ref Range    Color Yellow     Character Cloudy (A)     Specific Gravity 1.036 <1.035    Ph 5.0 5.0 - 8.0    Glucose Negative Negative mg/dL    Ketones 15 (A) Negative mg/dL    Protein 100 (A) Negative mg/dL    Bilirubin Negative Negative    Urobilinogen, Urine 0.2 Negative    Nitrite Negative Negative    Leukocyte  Esterase Trace (A) Negative    Occult Blood Moderate (A) Negative    Micro Urine Req Microscopic    ESTIMATED GFR   Result Value Ref Range    GFR (CKD-EPI) 102 >60 mL/min/1.73 m 2   URINE MICROSCOPIC (W/UA)   Result Value Ref Range    WBC 10-20 (A) /hpf    RBC 20-50 (A) /hpf    Bacteria Negative None /hpf    Epithelial Cells Few /hpf    Epithelial Cells Renal Rare /hpf    Hyaline Cast >10 (A) /lpf       Medications   NS infusion 1,000 mL (0 mL Intravenous Stopped 10/20/22 0711)   ondansetron (ZOFRAN) syringe/vial injection 4 mg (4 mg Intravenous Given 10/20/22 0612)   potassium chloride (KCL) ivpb 10 mEq (0 mEq Intravenous Stopped 10/20/22 0714)   morphine 4 MG/ML injection 4 mg (4 mg Intravenous Given 10/20/22 0620)   morphine 4 MG/ML injection 4 mg (4 mg Intravenous Given 10/20/22 0757)   ondansetron (ZOFRAN) syringe/vial injection 4 mg (4 mg Intravenous Given 10/20/22 0757)   iohexol (OMNIPAQUE) 350 mg/mL (IV) (100 mL Intravenous Given 10/20/22 0757)       Measures:  -Hydration: Patient was given IV fluids because of diarrhea.  Oral fluids were not appropriate because of a possible surgical problem.  On recheck the patient was improved    COURSE & MEDICAL DECISION MAKING  Patient presents with abdominal pain diarrhea in the setting of known Crohn's disease.  She had a significant amount of tenderness.  A surgical problem such as abscess or fistula is within the differential.  Discussed pros and cons of obtaining CT scan the patient would like to proceed with this.  Laboratory data was collected.  She has hypokalemia, but otherwise unremarkable.  Ordered KCl IV.  CT scan of the abdomen and pelvis is consistent with Crohn's without acute abdomen.  Radiologist mentions hypoechoic area in the kidney.  She does not have dysuria hematuria.  No bacteria on UA to suggest UA.  Her urine appears mostly contaminated.  Will await culture.  I consulted Dr. Oziel fuentes, gastroenterology.  He advised initiating prednisone and  have her continue her Humira.  I have written a tapering course of prednisone.  I have also given a prescription for potassium replenishment.  I have asked her to call Dr. Devries to make an appointment as soon as possible.  She should return to ER for fever, worsening symptoms, not improving, vomiting and unable to keep down medications or concern.      FINAL IMPRESSION  1.  Abdominal pain  2.  Diarrhea  3.  Hypokalemia  4.  Exacerbation of Crohn's disease        This dictation was created using voice recognition software. The accuracy of the dictation is limited to the abilities of the software.  The nursing notes were reviewed and certain aspects of this information were incorporated into this note.    Electronically signed by: Lenard Barry M.D., 10/20/2022 4:53 AM

## 2022-10-20 NOTE — ED NOTES
Patient ambulatory to [facility]  for triage complaint. Pt placed in gown and placed on monitor. Pt provided blankets.

## 2022-10-20 NOTE — ED NOTES
Pt ambulating to the bathroom with steady gait, requesting pain meds, 7/10 abdominal pain, mild nausea, no active vomiting, oral contrast finished and tolerated, pt awaiting CT.

## 2022-10-20 NOTE — Clinical Note
Mi Bay was seen and treated in our emergency department on 10/20/2022.  She may return to work on 10/23/2022.       If you have any questions or concerns, please don't hesitate to call.      Lenard Barry M.D.

## 2022-10-22 LAB
BACTERIA UR CULT: NORMAL
SIGNIFICANT IND 70042: NORMAL
SITE SITE: NORMAL
SOURCE SOURCE: NORMAL

## 2022-11-07 ENCOUNTER — HOSPITAL ENCOUNTER (OUTPATIENT)
Facility: MEDICAL CENTER | Age: 42
End: 2022-11-08
Attending: EMERGENCY MEDICINE | Admitting: INTERNAL MEDICINE
Payer: COMMERCIAL

## 2022-11-07 DIAGNOSIS — K50.10 CROHN'S DISEASE OF LARGE INTESTINE WITHOUT COMPLICATION (HCC): ICD-10-CM

## 2022-11-07 DIAGNOSIS — E86.0 DEHYDRATION: ICD-10-CM

## 2022-11-07 DIAGNOSIS — R11.2 NAUSEA AND VOMITING, UNSPECIFIED VOMITING TYPE: ICD-10-CM

## 2022-11-07 DIAGNOSIS — R10.84 GENERALIZED ABDOMINAL PAIN: ICD-10-CM

## 2022-11-07 PROBLEM — D72.829 LEUKOCYTOSIS: Status: ACTIVE | Noted: 2022-11-07

## 2022-11-07 LAB
ALBUMIN SERPL BCP-MCNC: 3.9 G/DL (ref 3.2–4.9)
ALBUMIN/GLOB SERPL: 0.8 G/DL
ALP SERPL-CCNC: 93 U/L (ref 30–99)
ALT SERPL-CCNC: 12 U/L (ref 2–50)
ANION GAP SERPL CALC-SCNC: 20 MMOL/L (ref 7–16)
APPEARANCE UR: CLEAR
AST SERPL-CCNC: 16 U/L (ref 12–45)
BACTERIA #/AREA URNS HPF: NEGATIVE /HPF
BASOPHILS # BLD AUTO: 0.4 % (ref 0–1.8)
BASOPHILS # BLD: 0.05 K/UL (ref 0–0.12)
BILIRUB SERPL-MCNC: 0.4 MG/DL (ref 0.1–1.5)
BILIRUB UR QL STRIP.AUTO: NEGATIVE
BUN SERPL-MCNC: 12 MG/DL (ref 8–22)
CALCIUM SERPL-MCNC: 9.6 MG/DL (ref 8.5–10.5)
CHLORIDE SERPL-SCNC: 98 MMOL/L (ref 96–112)
CO2 SERPL-SCNC: 19 MMOL/L (ref 20–33)
COLOR UR: YELLOW
CREAT SERPL-MCNC: 0.69 MG/DL (ref 0.5–1.4)
CRP SERPL HS-MCNC: 14.05 MG/DL (ref 0–0.75)
EOSINOPHIL # BLD AUTO: 0.08 K/UL (ref 0–0.51)
EOSINOPHIL NFR BLD: 0.6 % (ref 0–6.9)
EPI CELLS #/AREA URNS HPF: NEGATIVE /HPF
ERYTHROCYTE [DISTWIDTH] IN BLOOD BY AUTOMATED COUNT: 43.6 FL (ref 35.9–50)
ERYTHROCYTE [SEDIMENTATION RATE] IN BLOOD BY WESTERGREN METHOD: 37 MM/HOUR (ref 0–25)
GFR SERPLBLD CREATININE-BSD FMLA CKD-EPI: 111 ML/MIN/1.73 M 2
GLOBULIN SER CALC-MCNC: 4.6 G/DL (ref 1.9–3.5)
GLUCOSE SERPL-MCNC: 62 MG/DL (ref 65–99)
GLUCOSE UR STRIP.AUTO-MCNC: NEGATIVE MG/DL
HCG SERPL QL: NEGATIVE
HCT VFR BLD AUTO: 38.4 % (ref 37–47)
HGB BLD-MCNC: 12.4 G/DL (ref 12–16)
IMM GRANULOCYTES # BLD AUTO: 0.07 K/UL (ref 0–0.11)
IMM GRANULOCYTES NFR BLD AUTO: 0.5 % (ref 0–0.9)
KETONES UR STRIP.AUTO-MCNC: >=160 MG/DL
LEUKOCYTE ESTERASE UR QL STRIP.AUTO: ABNORMAL
LIPASE SERPL-CCNC: 19 U/L (ref 11–82)
LYMPHOCYTES # BLD AUTO: 1.38 K/UL (ref 1–4.8)
LYMPHOCYTES NFR BLD: 9.7 % (ref 22–41)
MCH RBC QN AUTO: 29.6 PG (ref 27–33)
MCHC RBC AUTO-ENTMCNC: 32.3 G/DL (ref 33.6–35)
MCV RBC AUTO: 91.6 FL (ref 81.4–97.8)
MICRO URNS: ABNORMAL
MONOCYTES # BLD AUTO: 1.83 K/UL (ref 0–0.85)
MONOCYTES NFR BLD AUTO: 12.9 % (ref 0–13.4)
NEUTROPHILS # BLD AUTO: 10.78 K/UL (ref 2–7.15)
NEUTROPHILS NFR BLD: 75.9 % (ref 44–72)
NITRITE UR QL STRIP.AUTO: NEGATIVE
NRBC # BLD AUTO: 0 K/UL
NRBC BLD-RTO: 0 /100 WBC
PH UR STRIP.AUTO: 5 [PH] (ref 5–8)
PLATELET # BLD AUTO: 413 K/UL (ref 164–446)
PMV BLD AUTO: 9.9 FL (ref 9–12.9)
POTASSIUM SERPL-SCNC: 3.4 MMOL/L (ref 3.6–5.5)
PROCALCITONIN SERPL-MCNC: 0.1 NG/ML
PROT SERPL-MCNC: 8.5 G/DL (ref 6–8.2)
PROT UR QL STRIP: NEGATIVE MG/DL
RBC # BLD AUTO: 4.19 M/UL (ref 4.2–5.4)
RBC # URNS HPF: ABNORMAL /HPF
RBC UR QL AUTO: ABNORMAL
SODIUM SERPL-SCNC: 137 MMOL/L (ref 135–145)
SP GR UR STRIP.AUTO: 1.02
UROBILINOGEN UR STRIP.AUTO-MCNC: 0.2 MG/DL
WBC # BLD AUTO: 14.2 K/UL (ref 4.8–10.8)
WBC #/AREA URNS HPF: ABNORMAL /HPF

## 2022-11-07 PROCEDURE — 80053 COMPREHEN METABOLIC PANEL: CPT

## 2022-11-07 PROCEDURE — G0378 HOSPITAL OBSERVATION PER HR: HCPCS

## 2022-11-07 PROCEDURE — A9270 NON-COVERED ITEM OR SERVICE: HCPCS | Performed by: INTERNAL MEDICINE

## 2022-11-07 PROCEDURE — 84703 CHORIONIC GONADOTROPIN ASSAY: CPT

## 2022-11-07 PROCEDURE — A9270 NON-COVERED ITEM OR SERVICE: HCPCS | Performed by: EMERGENCY MEDICINE

## 2022-11-07 PROCEDURE — 96375 TX/PRO/DX INJ NEW DRUG ADDON: CPT

## 2022-11-07 PROCEDURE — 85652 RBC SED RATE AUTOMATED: CPT

## 2022-11-07 PROCEDURE — 83690 ASSAY OF LIPASE: CPT

## 2022-11-07 PROCEDURE — 700105 HCHG RX REV CODE 258: Performed by: INTERNAL MEDICINE

## 2022-11-07 PROCEDURE — 700105 HCHG RX REV CODE 258: Performed by: EMERGENCY MEDICINE

## 2022-11-07 PROCEDURE — 84145 PROCALCITONIN (PCT): CPT

## 2022-11-07 PROCEDURE — 85025 COMPLETE CBC W/AUTO DIFF WBC: CPT

## 2022-11-07 PROCEDURE — 99220 PR INITIAL OBSERVATION CARE,LEVL III: CPT | Performed by: INTERNAL MEDICINE

## 2022-11-07 PROCEDURE — 81001 URINALYSIS AUTO W/SCOPE: CPT

## 2022-11-07 PROCEDURE — 700111 HCHG RX REV CODE 636 W/ 250 OVERRIDE (IP): Performed by: EMERGENCY MEDICINE

## 2022-11-07 PROCEDURE — 86140 C-REACTIVE PROTEIN: CPT

## 2022-11-07 PROCEDURE — 36415 COLL VENOUS BLD VENIPUNCTURE: CPT

## 2022-11-07 PROCEDURE — 700102 HCHG RX REV CODE 250 W/ 637 OVERRIDE(OP): Performed by: INTERNAL MEDICINE

## 2022-11-07 PROCEDURE — 96374 THER/PROPH/DIAG INJ IV PUSH: CPT

## 2022-11-07 PROCEDURE — 99285 EMERGENCY DEPT VISIT HI MDM: CPT

## 2022-11-07 PROCEDURE — 700102 HCHG RX REV CODE 250 W/ 637 OVERRIDE(OP): Performed by: EMERGENCY MEDICINE

## 2022-11-07 RX ORDER — MORPHINE SULFATE 4 MG/ML
4 INJECTION INTRAVENOUS ONCE
Status: COMPLETED | OUTPATIENT
Start: 2022-11-07 | End: 2022-11-07

## 2022-11-07 RX ORDER — ONDANSETRON 2 MG/ML
4 INJECTION INTRAMUSCULAR; INTRAVENOUS EVERY 4 HOURS PRN
Status: DISCONTINUED | OUTPATIENT
Start: 2022-11-07 | End: 2022-11-08 | Stop reason: HOSPADM

## 2022-11-07 RX ORDER — PANTOPRAZOLE SODIUM 40 MG/10ML
40 INJECTION, POWDER, LYOPHILIZED, FOR SOLUTION INTRAVENOUS DAILY
Status: DISCONTINUED | OUTPATIENT
Start: 2022-11-08 | End: 2022-11-08 | Stop reason: HOSPADM

## 2022-11-07 RX ORDER — PREDNISONE 20 MG/1
40 TABLET ORAL DAILY
Status: DISCONTINUED | OUTPATIENT
Start: 2022-11-08 | End: 2022-11-08 | Stop reason: HOSPADM

## 2022-11-07 RX ORDER — KETOROLAC TROMETHAMINE 30 MG/ML
30 INJECTION, SOLUTION INTRAMUSCULAR; INTRAVENOUS ONCE
Status: COMPLETED | OUTPATIENT
Start: 2022-11-07 | End: 2022-11-07

## 2022-11-07 RX ORDER — ADALIMUMAB 40MG/0.4ML
40 KIT SUBCUTANEOUS
COMMUNITY
Start: 2022-11-08

## 2022-11-07 RX ORDER — PROMETHAZINE HYDROCHLORIDE 25 MG/1
12.5-25 TABLET ORAL EVERY 4 HOURS PRN
Status: DISCONTINUED | OUTPATIENT
Start: 2022-11-07 | End: 2022-11-08 | Stop reason: HOSPADM

## 2022-11-07 RX ORDER — ENOXAPARIN SODIUM 100 MG/ML
40 INJECTION SUBCUTANEOUS DAILY
Status: DISCONTINUED | OUTPATIENT
Start: 2022-11-07 | End: 2022-11-08 | Stop reason: HOSPADM

## 2022-11-07 RX ORDER — GABAPENTIN 300 MG/1
300 CAPSULE ORAL DAILY
Status: DISCONTINUED | OUTPATIENT
Start: 2022-11-07 | End: 2022-11-07

## 2022-11-07 RX ORDER — SODIUM CHLORIDE 9 MG/ML
1000 INJECTION, SOLUTION INTRAVENOUS ONCE
Status: COMPLETED | OUTPATIENT
Start: 2022-11-07 | End: 2022-11-07

## 2022-11-07 RX ORDER — BUDESONIDE 3 MG/1
6 CAPSULE, COATED PELLETS ORAL EVERY MORNING
Status: DISCONTINUED | OUTPATIENT
Start: 2022-11-07 | End: 2022-11-07

## 2022-11-07 RX ORDER — SODIUM CHLORIDE 9 MG/ML
INJECTION, SOLUTION INTRAVENOUS CONTINUOUS
Status: DISCONTINUED | OUTPATIENT
Start: 2022-11-07 | End: 2022-11-08 | Stop reason: HOSPADM

## 2022-11-07 RX ORDER — OXYCODONE HYDROCHLORIDE 5 MG/1
5 TABLET ORAL EVERY 4 HOURS PRN
Status: DISCONTINUED | OUTPATIENT
Start: 2022-11-07 | End: 2022-11-08 | Stop reason: HOSPADM

## 2022-11-07 RX ORDER — MIRTAZAPINE 7.5 MG/1
7.5 TABLET, FILM COATED ORAL
Status: DISCONTINUED | OUTPATIENT
Start: 2022-11-07 | End: 2022-11-07

## 2022-11-07 RX ORDER — ONDANSETRON 4 MG/1
4 TABLET, ORALLY DISINTEGRATING ORAL EVERY 4 HOURS PRN
Status: DISCONTINUED | OUTPATIENT
Start: 2022-11-07 | End: 2022-11-08 | Stop reason: HOSPADM

## 2022-11-07 RX ORDER — ARIPIPRAZOLE 5 MG/1
5 TABLET ORAL DAILY
Status: DISCONTINUED | OUTPATIENT
Start: 2022-11-07 | End: 2022-11-07

## 2022-11-07 RX ORDER — PROMETHAZINE HYDROCHLORIDE 12.5 MG/1
12.5-25 SUPPOSITORY RECTAL EVERY 4 HOURS PRN
Status: DISCONTINUED | OUTPATIENT
Start: 2022-11-07 | End: 2022-11-08 | Stop reason: HOSPADM

## 2022-11-07 RX ORDER — ONDANSETRON 2 MG/ML
8 INJECTION INTRAMUSCULAR; INTRAVENOUS ONCE
Status: COMPLETED | OUTPATIENT
Start: 2022-11-07 | End: 2022-11-07

## 2022-11-07 RX ORDER — METHYLPREDNISOLONE SODIUM SUCCINATE 125 MG/2ML
125 INJECTION, POWDER, LYOPHILIZED, FOR SOLUTION INTRAMUSCULAR; INTRAVENOUS ONCE
Status: ACTIVE | OUTPATIENT
Start: 2022-11-07 | End: 2022-11-08

## 2022-11-07 RX ORDER — PROCHLORPERAZINE EDISYLATE 5 MG/ML
5-10 INJECTION INTRAMUSCULAR; INTRAVENOUS EVERY 4 HOURS PRN
Status: DISCONTINUED | OUTPATIENT
Start: 2022-11-07 | End: 2022-11-08 | Stop reason: HOSPADM

## 2022-11-07 RX ADMIN — LIDOCAINE HYDROCHLORIDE 30 ML: 20 SOLUTION OROPHARYNGEAL at 17:00

## 2022-11-07 RX ADMIN — KETOROLAC TROMETHAMINE 30 MG: 30 INJECTION, SOLUTION INTRAMUSCULAR; INTRAVENOUS at 17:14

## 2022-11-07 RX ADMIN — MORPHINE SULFATE 4 MG: 4 INJECTION INTRAVENOUS at 17:15

## 2022-11-07 RX ADMIN — OXYCODONE 5 MG: 5 TABLET ORAL at 19:38

## 2022-11-07 RX ADMIN — ONDANSETRON HYDROCHLORIDE 8 MG: 2 SOLUTION INTRAMUSCULAR; INTRAVENOUS at 17:14

## 2022-11-07 RX ADMIN — SODIUM CHLORIDE: 9 INJECTION, SOLUTION INTRAVENOUS at 19:16

## 2022-11-07 RX ADMIN — SODIUM CHLORIDE 1000 ML: 9 INJECTION, SOLUTION INTRAVENOUS at 17:14

## 2022-11-07 ASSESSMENT — ENCOUNTER SYMPTOMS
CONSTIPATION: 0
FEVER: 0
CLAUDICATION: 0
ABDOMINAL PAIN: 1
WEAKNESS: 0
BLURRED VISION: 0
PALPITATIONS: 0
VOMITING: 1
NAUSEA: 1
MYALGIAS: 0
WEIGHT LOSS: 0
COUGH: 0
DIARRHEA: 1
SPEECH CHANGE: 0
NECK PAIN: 0
HEMOPTYSIS: 0
DOUBLE VISION: 0
ORTHOPNEA: 0
CHILLS: 0
PHOTOPHOBIA: 0
DIZZINESS: 0

## 2022-11-07 ASSESSMENT — LIFESTYLE VARIABLES
EVER FELT BAD OR GUILTY ABOUT YOUR DRINKING: NO
HOW MANY TIMES IN THE PAST YEAR HAVE YOU HAD 5 OR MORE DRINKS IN A DAY: 0
HAVE YOU EVER FELT YOU SHOULD CUT DOWN ON YOUR DRINKING: NO
CONSUMPTION TOTAL: NEGATIVE
TOTAL SCORE: 0
TOTAL SCORE: 0
HAVE PEOPLE ANNOYED YOU BY CRITICIZING YOUR DRINKING: NO
TOTAL SCORE: 0
ON A TYPICAL DAY WHEN YOU DRINK ALCOHOL HOW MANY DRINKS DO YOU HAVE: 0
EVER HAD A DRINK FIRST THING IN THE MORNING TO STEADY YOUR NERVES TO GET RID OF A HANGOVER: NO
ALCOHOL_USE: NO
AVERAGE NUMBER OF DAYS PER WEEK YOU HAVE A DRINK CONTAINING ALCOHOL: 0

## 2022-11-07 ASSESSMENT — PATIENT HEALTH QUESTIONNAIRE - PHQ9
SUM OF ALL RESPONSES TO PHQ9 QUESTIONS 1 AND 2: 0
2. FEELING DOWN, DEPRESSED, IRRITABLE, OR HOPELESS: NOT AT ALL
1. LITTLE INTEREST OR PLEASURE IN DOING THINGS: NOT AT ALL

## 2022-11-07 ASSESSMENT — FIBROSIS 4 INDEX
FIB4 SCORE: 0.45
FIB4 SCORE: 0.47

## 2022-11-07 NOTE — ED TRIAGE NOTES
"Mi Bay  42 y.o. female  Chief Complaint   Patient presents with    Nausea/Vomiting/Diarrhea     Unable to keep water down for 3 days. Loose stool 9 x per day x 3 days. Hx Chron's, diagnosed with a flare 2 weeks ago. Symptoms relieved for 1 week, progressively worse.     Abdominal Pain     \"My whole abdomen is a 10/10 sharp, aching and burning.\" Right flank pain. Denies dysuria.        Pt ambulatory to triage with steady gait for above complaint.     Pt is GCS 15, speaking in full sentences, follows commands and responds appropriately to questions. Resp are even and unlabored.     Abdominal pain protocol ordered. Pt placed in draw room. Urine sample cup provided. Pt educated on triage process. Pt encouraged to alert staff for any changes.     This RN masked and in appropriate PPE during encounter.     Vitals:    11/07/22 1524   BP: (!) 158/88   Pulse: (!) 120   Resp: 17   Temp: 37.2 °C (98.9 °F)   SpO2: 96%      "

## 2022-11-08 ENCOUNTER — PHARMACY VISIT (OUTPATIENT)
Dept: PHARMACY | Facility: MEDICAL CENTER | Age: 42
End: 2022-11-08
Payer: COMMERCIAL

## 2022-11-08 VITALS
HEART RATE: 95 BPM | HEIGHT: 60 IN | WEIGHT: 134.48 LBS | DIASTOLIC BLOOD PRESSURE: 101 MMHG | TEMPERATURE: 99 F | RESPIRATION RATE: 18 BRPM | BODY MASS INDEX: 26.4 KG/M2 | OXYGEN SATURATION: 93 % | SYSTOLIC BLOOD PRESSURE: 158 MMHG

## 2022-11-08 PROBLEM — R11.2 NAUSEA AND VOMITING: Status: RESOLVED | Noted: 2022-11-07 | Resolved: 2022-11-08

## 2022-11-08 LAB
C DIFF DNA SPEC QL NAA+PROBE: NEGATIVE
C DIFF TOX A+B STL QL IA: NEGATIVE
C DIFF TOX GENS STL QL NAA+PROBE: NORMAL

## 2022-11-08 PROCEDURE — 87493 C DIFF AMPLIFIED PROBE: CPT

## 2022-11-08 PROCEDURE — A9270 NON-COVERED ITEM OR SERVICE: HCPCS | Performed by: INTERNAL MEDICINE

## 2022-11-08 PROCEDURE — 96375 TX/PRO/DX INJ NEW DRUG ADDON: CPT

## 2022-11-08 PROCEDURE — 700111 HCHG RX REV CODE 636 W/ 250 OVERRIDE (IP): Performed by: INTERNAL MEDICINE

## 2022-11-08 PROCEDURE — 700102 HCHG RX REV CODE 250 W/ 637 OVERRIDE(OP): Performed by: INTERNAL MEDICINE

## 2022-11-08 PROCEDURE — RXMED WILLOW AMBULATORY MEDICATION CHARGE: Performed by: NURSE PRACTITIONER

## 2022-11-08 PROCEDURE — 99217 PR OBSERVATION CARE DISCHARGE: CPT | Performed by: NURSE PRACTITIONER

## 2022-11-08 PROCEDURE — G0378 HOSPITAL OBSERVATION PER HR: HCPCS

## 2022-11-08 PROCEDURE — 87324 CLOSTRIDIUM AG IA: CPT

## 2022-11-08 PROCEDURE — C9113 INJ PANTOPRAZOLE SODIUM, VIA: HCPCS | Performed by: INTERNAL MEDICINE

## 2022-11-08 PROCEDURE — 700105 HCHG RX REV CODE 258: Performed by: INTERNAL MEDICINE

## 2022-11-08 RX ORDER — PREDNISONE 20 MG/1
TABLET ORAL
Qty: 35 TABLET | Refills: 0 | Status: SHIPPED | OUTPATIENT
Start: 2022-11-08 | End: 2022-12-06

## 2022-11-08 RX ADMIN — SODIUM CHLORIDE: 9 INJECTION, SOLUTION INTRAVENOUS at 06:10

## 2022-11-08 RX ADMIN — PREDNISONE 40 MG: 20 TABLET ORAL at 06:10

## 2022-11-08 RX ADMIN — PANTOPRAZOLE SODIUM 40 MG: 40 INJECTION, POWDER, FOR SOLUTION INTRAVENOUS at 06:10

## 2022-11-08 RX ADMIN — OXYCODONE 5 MG: 5 TABLET ORAL at 03:48

## 2022-11-08 ASSESSMENT — ENCOUNTER SYMPTOMS
SHORTNESS OF BREATH: 0
COUGH: 0
DIZZINESS: 0
FEVER: 0
CHILLS: 0
NAUSEA: 0
FOCAL WEAKNESS: 0
VOMITING: 0
FALLS: 0
DIARRHEA: 1

## 2022-11-08 NOTE — ED PROVIDER NOTES
"ED Provider  Scribed for Phillip Fairbanks D.O. by Abdon Kohler. 11/7/2022  4:08 PM    Means of arrival: Walk-In  History obtained from: Patient  History limited by: None    CHIEF COMPLAINT  Chief Complaint   Patient presents with    Nausea/Vomiting/Diarrhea     Unable to keep water down for 3 days. Loose stool 9 x per day x 3 days. Hx Chron's, diagnosed with a flare 2 weeks ago. Symptoms relieved for 1 week, progressively worse.     Abdominal Pain     \"My whole abdomen is a 10/10 sharp, aching and burning.\" Right flank pain. Denies dysuria.        HPI  Mi Bay is a 42 y.o. female with history of Crohn's disease who presents for evaluation of worsening abdominal pain onset 2 weeks ago. She describes her abdominal pain as 10/10, sharp, aching, and burning. She states she was evaluated here with CT scan performed and diagnosed with a Crohn's flare 2 weeks ago and she was prescribed steroids. She states her symptoms began to improve for 1 week, but her symptoms began progressively worsening again 3 days ago. She admits to associated symptoms of nausea, right sided flank pain, vomiting (unable to keep water down for 3 days), and diarrhea (9 episodes per day for 3 days), but denies dysuria, fevers, chills, dizziness, lightheadedness, or syncopal episodes. She reports taking Humira weekly and notes she only takes steroids when she experiences flares.     REVIEW OF SYSTEMS  See HPI for further details. All other systems are negative.     PAST MEDICAL HISTORY   has a past medical history of Anxiety, Crohn's disease (HCC), Depression, Hepatitis C, IBD (inflammatory bowel disease), and PNA (pneumonia).    SURGICAL HISTORY   has a past surgical history that includes colonoscopy with biopsy (N/A, 6/21/2016).    SOCIAL HISTORY  Social History     Tobacco Use    Smoking status: Never    Smokeless tobacco: Never   Vaping Use    Vaping Use: Never used   Substance and Sexual Activity    Alcohol use: No     Comment: last " use 7/17/2016; was daily    Drug use: No    Sexual activity: Never       FAMILY HISTORY  Family History   Problem Relation Age of Onset    Thyroid Mother     Bipolar disorder Father         father possibly bipolar    Diabetes Maternal Grandmother        CURRENT MEDICATIONS  Home Medications       Reviewed by Landon Anton R.N. (Registered Nurse) on 11/07/22 at 1534  Med List Status: Partial     Medication Last Dose Status   aripiprazole (ABILIFY) 5 MG tablet  Active   budesonide (ENTOCORT EC) 3 MG Cap DR Particles capsule  Active   gabapentin (NEURONTIN) 300 MG Cap  Active   mesalamine extended-release (PENTASA) 500 MG capsule  Active   mirtazapine (REMERON) 15 MG Tab  Active   ondansetron (ZOFRAN ODT) 4 MG TABLET DISPERSIBLE  Active   potassium chloride SA (KDUR) 20 MEQ Tab CR  Active   predniSONE (DELTASONE) 10 MG Tab  Active   sertraline (ZOLOFT) 50 MG Tab  Active             ALLERGIES  Allergies   Allergen Reactions    Phenergan [Promethazine]      Anxiety      Seroquel [Quetiapine] Itching    Trazodone Itching       PHYSICAL EXAM  VITAL SIGNS: BP (!) 158/88   Pulse (!) 120   Temp 37.2 °C (98.9 °F) (Temporal)   Resp 17   Ht 1.524 m (5')   Wt 60.6 kg (133 lb 9.6 oz)   LMP 10/08/2022   SpO2 96%   BMI 26.09 kg/m²     Constitutional: Alert in no apparent distress.  HENT: No signs of trauma, mucous membranes are moist  Eyes: Conjunctiva normal, Non-icteric.   Neck: Normal range of motion, No tenderness, Supple.  Lymphatic: No lymphadenopathy noted.   Cardiovascular: Tachycardic rate and regular rhythm, no murmurs.   Thorax & Lungs: Normal breath sounds, No respiratory distress, No wheezing, No chest tenderness.   Abdomen: Bowel sounds normal, Soft, mild generalized abdominal tenderness, No masses, No pulsatile masses. No peritoneal signs.  Skin: Warm, Dry, normal color.   Back: No bony tenderness, No CVA tenderness.   Extremities: No edema, No tenderness, No cyanosis  Musculoskeletal: Good range of motion  in all major joints. No tenderness to palpation or major deformities noted.   Neurologic: Alert and oriented x4, Normal motor function, Normal sensory function, No focal deficits noted.   Psychiatric: Affect normal, Judgment normal, Mood normal.       DIAGNOSTIC STUDIES / PROCEDURES    LABS  Results for orders placed or performed during the hospital encounter of 11/07/22   CBC WITH DIFFERENTIAL   Result Value Ref Range    WBC 14.2 (H) 4.8 - 10.8 K/uL    RBC 4.19 (L) 4.20 - 5.40 M/uL    Hemoglobin 12.4 12.0 - 16.0 g/dL    Hematocrit 38.4 37.0 - 47.0 %    MCV 91.6 81.4 - 97.8 fL    MCH 29.6 27.0 - 33.0 pg    MCHC 32.3 (L) 33.6 - 35.0 g/dL    RDW 43.6 35.9 - 50.0 fL    Platelet Count 413 164 - 446 K/uL    MPV 9.9 9.0 - 12.9 fL    Neutrophils-Polys 75.90 (H) 44.00 - 72.00 %    Lymphocytes 9.70 (L) 22.00 - 41.00 %    Monocytes 12.90 0.00 - 13.40 %    Eosinophils 0.60 0.00 - 6.90 %    Basophils 0.40 0.00 - 1.80 %    Immature Granulocytes 0.50 0.00 - 0.90 %    Nucleated RBC 0.00 /100 WBC    Neutrophils (Absolute) 10.78 (H) 2.00 - 7.15 K/uL    Lymphs (Absolute) 1.38 1.00 - 4.80 K/uL    Monos (Absolute) 1.83 (H) 0.00 - 0.85 K/uL    Eos (Absolute) 0.08 0.00 - 0.51 K/uL    Baso (Absolute) 0.05 0.00 - 0.12 K/uL    Immature Granulocytes (abs) 0.07 0.00 - 0.11 K/uL    NRBC (Absolute) 0.00 K/uL   COMP METABOLIC PANEL   Result Value Ref Range    Sodium 137 135 - 145 mmol/L    Potassium 3.4 (L) 3.6 - 5.5 mmol/L    Chloride 98 96 - 112 mmol/L    Co2 19 (L) 20 - 33 mmol/L    Anion Gap 20.0 (H) 7.0 - 16.0    Glucose 62 (L) 65 - 99 mg/dL    Bun 12 8 - 22 mg/dL    Creatinine 0.69 0.50 - 1.40 mg/dL    Calcium 9.6 8.5 - 10.5 mg/dL    AST(SGOT) 16 12 - 45 U/L    ALT(SGPT) 12 2 - 50 U/L    Alkaline Phosphatase 93 30 - 99 U/L    Total Bilirubin 0.4 0.1 - 1.5 mg/dL    Albumin 3.9 3.2 - 4.9 g/dL    Total Protein 8.5 (H) 6.0 - 8.2 g/dL    Globulin 4.6 (H) 1.9 - 3.5 g/dL    A-G Ratio 0.8 g/dL   LIPASE   Result Value Ref Range    Lipase 19 11 - 82  U/L   HCG QUAL SERUM   Result Value Ref Range    Beta-Hcg Qualitative Serum Negative Negative   ESTIMATED GFR   Result Value Ref Range    GFR (CKD-EPI) 111 >60 mL/min/1.73 m 2   Sed Rate   Result Value Ref Range    Sed Rate Westergren 37 (H) 0 - 25 mm/hour   PROCALCITONIN   Result Value Ref Range    Procalcitonin 0.10 <0.25 ng/mL   CRP QUANTITIVE (NON-CARDIAC)   Result Value Ref Range    Stat C-Reactive Protein 14.05 (H) 0.00 - 0.75 mg/dL       All labs reviewed by me.    COURSE  Pertinent Labs & Imaging studies reviewed. (See chart for details)    4:08 PM - Patient seen and examined at bedside. Discussed plan of care. The patient will be resuscitated with 1L NS IV and medicated with ondansetron 8 mg injection, GI Cocktail 30 mL oral suspension, morphine 4 mg injection, and ketorolac 30 mg injection. Ordered for CBC w/ diff, CMP, lipase, HCG Qual Serum, and UA w/ culture if indicated to evaluate her symptoms.     5:06 PM - Paged Hospitalist.    5:20 PM I discussed the patient's case and the above findings with Dr. Roca (Hospitalist) who will assess the patient for hospitalization. Patient will be medicated with Solu-Medrol 125 mg INJ.     5:26 PM - I reevaluated the patient at bedside. I discussed the patient's diagnostic study results. I informed the patient of my plan to admit today given the patient's current presentation and diagnostic study results. Patient verbalizes understanding and support with my plan for admission.     HYDRATION: Based on the patient's presentation of Acute Vomiting and Tachycardia the patient was given IV fluids. IV Hydration was used because oral hydration was not adequate alone. Upon recheck following hydration, the patient was improved.     MEDICAL DECISION MAKING  This is a 42 y.o. female who presents with history of Crohn's disease.  He has been having vomiting and diarrhea for approximately 2 weeks.  Was seen here 2 weeks ago had CT done which showed no concerns for Crohn's  disease she was discharged home with antiemetics, and steroid medication.  Has a steroids were tapering off the symptoms returned.  She has not been able to tolerate p.o. for the last 2 days.  Lab test shows concerns for dehydration.  She is failed outpatient treatment and she will be admitted for further evaluation and treatment.  Her abdominal exam is nonsurgical.  She had a CT done a little less than 2 weeks ago for similar showed no obstruction.    DISPOSITION:  Patient will be hospitalized by Dr. Roca in guarded condition.      FINAL IMPRESSION  1. Nausea and vomiting, unspecified vomiting type    2. Generalized abdominal pain    3. Dehydration         Abdon BROWN (Scribe), am scribing for, and in the presence of, Phillip Fairbanks D.O..    Electronically signed by: Abdon Kohler (Scribe), 11/7/2022    Phillip BROWN D.O. personally performed the services described in this documentation, as scribed by Abdon Kohler in my presence, and it is both accurate and complete.    The note accurately reflects work and decisions made by me.  Phillip Fairbanks D.O.  11/7/2022  9:24 PM

## 2022-11-08 NOTE — ASSESSMENT & PLAN NOTE
Last flare couple years ago  Came with abdominal pain nausea and vomiting, likely mild flare  Improved with prednisone  Inflammation markers, elevated  Antiemesis and resume prednisone 40 mg  Patient is taking Humira as outpatient  No need for CT scan at this time, last CT scan a month ago showed questioning about colitis  Remote history of C. difficile, concern with new diarrhea, will send C. difficile sample  Follow-up with GI as outpatient

## 2022-11-08 NOTE — PROGRESS NOTES
Report successfully received, assuming care. Pt in bed resting, does have pain complaint in abdomen area, rated as 6/10, will notify on call MD. Patient declines any nausea. Discussed plan of care & shift goals.     Personal items and call light within reach, bed locked and in lowest position, bed alarm on.   Will continue to monitor.

## 2022-11-08 NOTE — ASSESSMENT & PLAN NOTE
Likely due to nausea and vomiting  IV fluid  Close monitoring and check electrolytes and labs daily

## 2022-11-08 NOTE — DISCHARGE SUMMARY
"Discharge Summary    CHIEF COMPLAINT ON ADMISSION  Chief Complaint   Patient presents with    Nausea/Vomiting/Diarrhea     Unable to keep water down for 3 days. Loose stool 9 x per day x 3 days. Hx Chron's, diagnosed with a flare 2 weeks ago. Symptoms relieved for 1 week, progressively worse.     Abdominal Pain     \"My whole abdomen is a 10/10 sharp, aching and burning.\" Right flank pain. Denies dysuria.        Reason for Admission  n/v/d    Admission Date  11/7/2022    CODE STATUS  Full Code    HPI & HOSPITAL COURSE  42-year-old female with history of depression and Crohn's disease presented 11/7 with nausea vomiting and 9-day history of diarrhea.  Patient came in for similar symptoms approximately a month ago, CT scan was done at that time which showed possible Crohn's flare and associated inflammation.  Patient was discharged on steroids, however she states that when she started to taper her steroids she had recurrent symptoms.  Prior to previous hospitalization patient had missed her Humira and states this is the cause of her Crohn's flare, had not had any flares for 3 years prior to that.  Patient denies any fever, chills, hematemesis or melena.  She does report that she has a remote history of C. difficile, given presentation with persistent diarrhea and leukocytosis concern for recurrence.      On admission vital signs showed tachycardia.  Labs showed leukocytosis 14.2 with metabolic acidosis and elevated anion gap.  Patient was noted to be dehydrated and started on IV fluid, in addition prednisone was started back at a higher dose.    Patient reports improvement in symptoms following IV rehydration and restarting the higher dose steroids. Tolerating clear liquid diet, no additional episodes of emesis. Continues to have diarrhea, reports a remote history of c-diff. Stool sample sent for c-diff came back negative. Most likely persistent flare of crohn's from the time patient missed her Humira. Was previously " discharged with short course of steroids. Will discharge on a longer 4 week taper, to help bridge therapy of restarting Humira.    I have performed a physical exam and reviewed and updated ROS and Plan today (11/8/2022). In review of yesterday's note (11/7/2022), there are no changes except as documented above.      Therefore, she is discharged in good and stable condition to home with close outpatient follow-up.    The patient recovered much more quickly than anticipated on admission.    Discharge Date  11/08/2011    FOLLOW UP ITEMS POST DISCHARGE  Continue 4 week prednisone taper, schedule follow up with GI or PCP as soon as possible for ongoing management.    Resume Humira    DISCHARGE DIAGNOSES  Principal Problem (Resolved):    Nausea and vomiting POA: Yes  Active Problems:    Depression POA: Yes    Crohn's disease (HCC) POA: Yes    Leukocytosis POA: Unknown  Resolved Problems:    Dehydration POA: Unknown      FOLLOW UP  No future appointments.  Rosalind Borja M.D.  [id number]  [id number]  [facility] [address]  [phone]    Follow up      MEDICATIONS ON DISCHARGE     Medication List        START taking these medications        Instructions   predniSONE 20 MG Tabs  Start taking on: November 8, 2022  Commonly known as: DELTASONE   Take 2 Tablets by mouth every day for 7 days, THEN 1.5 Tablets every day for 7 days, THEN 1 Tablet every day for 7 days, THEN 0.5 Tablets every day for 7 days.            CONTINUE taking these medications        Instructions   Humira 40 MG/0.4ML Pskt  Generic drug: Adalimumab   Inject 40 mg under the skin every 7 days. Indications: Crohn's Disease  Dose: 40 mg     ondansetron 4 MG Tbdp  Commonly known as: ZOFRAN ODT   Take 1 Tab by mouth every four hours as needed for Nausea (give PO if no IV route available).  Dose: 4 mg              Allergies  Allergies   Allergen Reactions    Phenergan [Promethazine]      Anxiety      Seroquel [Quetiapine] Itching    Trazodone Itching        DIET  Orders Placed This Encounter   Procedures    Diet Order Diet: Clear Liquid     Standing Status:   Standing     Number of Occurrences:   1     Order Specific Question:   Diet:     Answer:   Clear Liquid [10]       ACTIVITY  As tolerated.  Weight bearing as tolerated    CONSULTATIONS  None    PROCEDURES  None    LABORATORY  Lab Results   Component Value Date    SODIUM 137 11/07/2022    POTASSIUM 3.4 (L) 11/07/2022    CHLORIDE 98 11/07/2022    CO2 19 (L) 11/07/2022    GLUCOSE 62 (L) 11/07/2022    BUN 12 11/07/2022    CREATININE 0.69 11/07/2022    CREATININE 0.6 01/09/2007        Lab Results   Component Value Date    WBC 14.2 (H) 11/07/2022    HEMOGLOBIN 12.4 11/07/2022    HEMATOCRIT 38.4 11/07/2022    PLATELETCT 413 11/07/2022      No orders to display        Total time of the discharge process took 35 minutes.

## 2022-11-08 NOTE — HOSPITAL COURSE
42-year-old female with history of depression and Crohn's disease presented 11/7 with nausea vomiting and 9-day history of diarrhea.  Patient came in for similar symptoms approximately a month ago, CT scan was done at that time which showed possible Crohn's flare and associated inflammation.  Patient was discharged on steroids, however she states that when she started to taper her steroids she had recurrent symptoms.  Prior to previous hospitalization patient had missed her Humira and states this is the cause of her Crohn's flare, had not had any flares for 3 years prior to that.  Patient denies any fever, chills, hematemesis or melena.  She does report that she has a remote history of C. difficile, given presentation with persistent diarrhea and leukocytosis concern for recurrence.      On admission vital signs showed tachycardia.  Labs showed leukocytosis 14.2 with metabolic acidosis and elevated anion gap.  Patient was noted to be dehydrated and started on IV fluid, in addition prednisone was started back at a higher dose.    Patient reports improvement in symptoms following IV rehydration and restarting the higher dose steroids. Tolerating clear liquid diet, no additional episodes of emesis. Continues to have diarrhea, reports a remote history of c-diff. Stool sample sent for c-diff came back negative. Most likely persistent flare of crohn's from the time patient missed her Humira. Was previously discharged with short course of steroids. Will discharge on a longer 4 week taper, to help bridge therapy of restarting Humira.

## 2022-11-08 NOTE — PROGRESS NOTES
[facility] Medicine Daily Progress Note    Date of Service  11/8/2022    Chief Complaint  Mi Bay is a 42 y.o. female admitted 11/7/2022 with n/v/d.    Hospital Course  42-year-old female with history of depression and Crohn's disease presented 11/7 with nausea vomiting and 9-day history of diarrhea.  Patient came in for similar symptoms approximately a month ago, CT scan was done at that time which showed possible Crohn's flare and associated inflammation.  Patient was discharged on steroids, however she states that when she started to taper her steroids she had recurrent symptoms.  Prior to previous hospitalization patient had missed her Humira and states this is the cause of her Crohn's flare, had not had any flares for 3 years prior to that.  Patient denies any fever, chills, hematemesis or melena.  She does report that she has a remote history of C. difficile, given presentation with persistent diarrhea and leukocytosis concern for recurrence.      On admission vital signs showed tachycardia.  Labs showed leukocytosis 14.2 with metabolic acidosis and elevated anion gap.  Patient was noted to be dehydrated and started on IV fluid, in addition prednisone was started back at a higher dose.Continues to have diarrhea, reports a remote history of c-diff. Stool sample sent for c-diff.          Interval Problem Update  Patient sitting in bed, no signs of acute distress. Reports improvement in nausea and vomiting, continues to have watery diarrhea.  -Advance diet to clears  -Continue IV fluid  -Continue prednisone  -C-diff testing pending    I have discussed this patient's plan of care and discharge plan at IDT rounds today with Case Management, Nursing, Nursing leadership, and other members of the IDT team.    Consultants/Specialty  none    Code Status  Full Code    Disposition  Patient is not medically cleared for discharge.   Anticipate discharge to to home with close outpatient follow-up.  I have placed the  appropriate orders for post-discharge needs.    Review of Systems  Review of Systems   Constitutional:  Negative for chills, fever and malaise/fatigue.   Respiratory:  Negative for cough and shortness of breath.    Cardiovascular:  Negative for chest pain and leg swelling.   Gastrointestinal:  Positive for diarrhea. Negative for nausea and vomiting.   Genitourinary:  Negative for dysuria.   Musculoskeletal:  Negative for falls.   Neurological:  Negative for dizziness and focal weakness.      Physical Exam  Temp:  [36.5 °C (97.7 °F)-37.2 °C (99 °F)] 36.8 °C (98.2 °F)  Pulse:  [] 108  Resp:  [15-18] 18  BP: (116-158)/(62-88) 128/73  SpO2:  [91 %-97 %] 91 %    Physical Exam  Vitals and nursing note reviewed.   Constitutional:       General: She is not in acute distress.     Appearance: She is not ill-appearing.   HENT:      Head: Normocephalic.   Eyes:      Pupils: Pupils are equal, round, and reactive to light.   Cardiovascular:      Rate and Rhythm: Normal rate and regular rhythm.      Pulses: Normal pulses.      Heart sounds: Normal heart sounds. No murmur heard.  Pulmonary:      Effort: Pulmonary effort is normal.      Breath sounds: Normal breath sounds. No decreased air movement. No wheezing.   Abdominal:      General: Bowel sounds are normal.      Palpations: Abdomen is soft.      Tenderness: There is no abdominal tenderness. There is no right CVA tenderness, left CVA tenderness, guarding or rebound.   Musculoskeletal:      Right lower leg: No edema.      Left lower leg: No edema.   Skin:     General: Skin is warm.   Neurological:      General: No focal deficit present.      Mental Status: She is alert and oriented to person, place, and time.      Cranial Nerves: No cranial nerve deficit.   Psychiatric:         Behavior: Behavior is cooperative.       Fluids  No intake or output data in the 24 hours ending 11/08/22 1348    Laboratory  Recent Labs     11/07/22  1539   WBC 14.2*   RBC 4.19*   HEMOGLOBIN 12.4    HEMATOCRIT 38.4   MCV 91.6   MCH 29.6   MCHC 32.3*   RDW 43.6   PLATELETCT 413   MPV 9.9     Recent Labs     11/07/22  1539   SODIUM 137   POTASSIUM 3.4*   CHLORIDE 98   CO2 19*   GLUCOSE 62*   BUN 12   CREATININE 0.69   CALCIUM 9.6                   Imaging  No orders to display        Assessment/Plan  Leukocytosis  Assessment & Plan  Possible related to steroid use versus chronic flare  Inflammation markers elevated and procalcitonin normal, low suspicion for infection, more likely Crohn's flare  Labs daily  Close monitoring and start with antibiotics if needed    Crohn's disease (HCC)- (present on admission)  Assessment & Plan  Last flare couple years ago  Came with abdominal pain nausea and vomiting, likely mild flare  Improved with prednisone  Inflammation markers, elevated  Antiemesis and resume prednisone 40 mg  Patient is taking Humira as outpatient  No need for CT scan at this time, last CT scan a month ago showed questioning about colitis  Remote history of C. difficile, concern with new diarrhea, will send C. difficile sample  Follow-up with GI as outpatient    Depression- (present on admission)  Assessment & Plan  Continue home medication Zoloft and mirtazapine         VTE prophylaxis: enoxaparin ppx    I have performed a physical exam and reviewed and updated ROS and Plan today (11/8/2022). In review of yesterday's note (11/7/2022), there are no changes except as documented above.

## 2022-11-08 NOTE — ASSESSMENT & PLAN NOTE
Possible related to steroid use versus chronic flare  Inflammation markers elevated and procalcitonin normal, low suspicion for infection, more likely Crohn's flare  Labs daily  Close monitoring and start with antibiotics if needed

## 2022-11-08 NOTE — H&P
"Hospital Medicine History & Physical Note    Date of Service  11/7/2022    Primary Care Physician  Rosalind Borja M.D.    Consultants  None      Code Status  Full Code    Chief Complaint  Chief Complaint   Patient presents with    Nausea/Vomiting/Diarrhea     Unable to keep water down for 3 days. Loose stool 9 x per day x 3 days. Hx Chron's, diagnosed with a flare 2 weeks ago. Symptoms relieved for 1 week, progressively worse.     Abdominal Pain     \"My whole abdomen is a 10/10 sharp, aching and burning.\" Right flank pain. Denies dysuria.        History of Presenting Illness    42-year-old female with history of depression and Crohn's disease presented 11/7 with nausea and vomiting.  A month ago patient came to the hospital for the same reason abdominal pain with nausea and vomiting, CT scan showed possible Crohn's flare and patient was discharged on prednisone, her symptoms was improved however when she started tapering the dose, her symptoms got worse.  Denied any fever or chills, no hematemesis or melena.  And no severe diarrhea.  On admission vital signs showed tachycardia.  Labs showed leukocytosis 14.2 with metabolic acidosis and elevated anion gap.  Patient was dry.  IV fluid with the prednisone were initiated for possible Crohn flare.      I discussed the plan of care with patient and bedside RN.    Review of Systems  Review of Systems   Constitutional:  Negative for chills, fever and weight loss.   HENT:  Negative for ear pain, hearing loss and tinnitus.    Eyes:  Negative for blurred vision, double vision and photophobia.   Respiratory:  Negative for cough and hemoptysis.    Cardiovascular:  Negative for chest pain, palpitations, orthopnea and claudication.   Gastrointestinal:  Positive for abdominal pain, diarrhea, nausea and vomiting. Negative for constipation.   Genitourinary:  Negative for dysuria, frequency and urgency.   Musculoskeletal:  Negative for myalgias and neck pain.   Skin:  Negative for " rash.   Neurological:  Negative for dizziness, speech change and weakness.     Past Medical History   has a past medical history of Anxiety, Crohn's disease (HCC), Depression, Hepatitis C, IBD (inflammatory bowel disease), and PNA (pneumonia).    Surgical History   has a past surgical history that includes colonoscopy with biopsy (N/A, 6/21/2016).     Family History  family history includes Bipolar disorder in her father; Diabetes in her maternal grandmother; Thyroid in her mother.   Family history reviewed with patient. There is no family history that is pertinent to the chief complaint.     Social History   reports that she has never smoked. She has never used smokeless tobacco. She reports that she does not drink alcohol and does not use drugs.    Allergies  Allergies   Allergen Reactions    Phenergan [Promethazine]      Anxiety      Seroquel [Quetiapine] Itching    Trazodone Itching       Medications  Prior to Admission Medications   Prescriptions Last Dose Informant Patient Reported? Taking?   aripiprazole (ABILIFY) 5 MG tablet   No No   Sig: Take 1 Tab by mouth every day.   budesonide (ENTOCORT EC) 3 MG Cap DR Particles capsule   No No   Sig: Take 2 Caps by mouth every morning.   gabapentin (NEURONTIN) 300 MG Cap   No No   Sig: Take 1 Cap by mouth every day.   mesalamine extended-release (PENTASA) 500 MG capsule   No No   Sig: Take 1 Cap by mouth 4 times a day.   mirtazapine (REMERON) 15 MG Tab   No No   Sig: Take 0.5 Tabs by mouth every bedtime.   ondansetron (ZOFRAN ODT) 4 MG TABLET DISPERSIBLE   No No   Sig: Take 1 Tab by mouth every four hours as needed for Nausea (give PO if no IV route available).   potassium chloride SA (KDUR) 20 MEQ Tab CR   No No   Sig: Take 1 Tablet by mouth 2 times a day.   predniSONE (DELTASONE) 10 MG Tab   No No   Sig: Take 4 tabs (40 mg) po daily on days 1-3, then take 3 tabs (30 mg) po daily on days 4-6, then take 2 tabs (20 mg) po daily on days 7-9, then take 1 tab (10 mg) po  daily on days 10-12, then take 1/2 tab (5 mg) po daily on days 13-15.   sertraline (ZOLOFT) 50 MG Tab   No No   Sig: Take 1.5 Tabs by mouth every day.      Facility-Administered Medications: None       Physical Exam  Temp:  [37.2 °C (98.9 °F)] 37.2 °C (98.9 °F)  Pulse:  [] 99  Resp:  [15-18] 18  BP: (149-158)/(84-88) 149/84  SpO2:  [94 %-96 %] 94 %  Blood Pressure: (!) 158/88   Temperature: 37.2 °C (98.9 °F)   Pulse: (!) 120   Respiration: 17   Pulse Oximetry: 96 %       Physical Exam  Constitutional:       Appearance: She is not ill-appearing.   HENT:      Head: Normocephalic and atraumatic.   Eyes:      General: No scleral icterus.  Cardiovascular:      Rate and Rhythm: Normal rate.      Heart sounds: No murmur heard.  Pulmonary:      Effort: No respiratory distress.      Breath sounds: No wheezing.   Abdominal:      General: There is no distension.      Palpations: Abdomen is soft.      Tenderness: There is abdominal tenderness. There is no guarding.   Musculoskeletal:      Right lower leg: No edema.      Left lower leg: No edema.   Skin:     General: Skin is dry.      Coloration: Skin is not jaundiced.      Findings: No bruising.   Neurological:      General: No focal deficit present.      Mental Status: She is alert and oriented to person, place, and time. Mental status is at baseline.      Cranial Nerves: No cranial nerve deficit.      Motor: No weakness.       Laboratory:  Recent Labs     11/07/22  1539   WBC 14.2*   RBC 4.19*   HEMOGLOBIN 12.4   HEMATOCRIT 38.4   MCV 91.6   MCH 29.6   MCHC 32.3*   RDW 43.6   PLATELETCT 413   MPV 9.9     Recent Labs     11/07/22  1539   SODIUM 137   POTASSIUM 3.4*   CHLORIDE 98   CO2 19*   GLUCOSE 62*   BUN 12   CREATININE 0.69   CALCIUM 9.6     Recent Labs     11/07/22  1539   ALTSGPT 12   ASTSGOT 16   ALKPHOSPHAT 93   TBILIRUBIN 0.4   LIPASE 19   GLUCOSE 62*         No results for input(s): NTPROBNP in the last 72 hours.      No results for input(s): TROPONINT in the  last 72 hours.    Imaging:  No orders to display       X-Ray:  I have personally reviewed the images and compared with prior images.  EKG:  I have personally reviewed the images and compared with prior images.    Assessment/Plan:  Justification for Admission Status  I anticipate this patient is appropriate for observation status at this time because dehydration and nausea vomiting    Patient will need a Med/Surg bed on MEDICAL service .  The need is secondary to dehydration and nausea vomiting.    * Nausea and vomiting- (present on admission)  Assessment & Plan  Likely related to Crohn's flare  Antiemesis with IV fluid  Start with prednisone and PPI    Crohn's disease (HCC)- (present on admission)  Assessment & Plan  Last flare couple years ago  Came with abdominal pain nausea and vomiting, likely mild flare  Improved with prednisone  Check inflammation markers  Antiemesis and resume prednisone 40 mg  Patient is taking Humira as outpatient  No need for CT scan at this time, last CT scan a month ago showed questioning about colitis  Follow-up with GI as outpatient    Dehydration  Assessment & Plan  Likely due to nausea and vomiting  IV fluid  Close monitoring and check electrolytes and labs daily    Leukocytosis  Assessment & Plan  Possible related to steroid use versus chronic flare  Check inflammation markers and procalcitonin  Labs daily  Close monitoring and start with antibiotics if needed    Depression- (present on admission)  Assessment & Plan  Continue home medication Zoloft and mirtazapine      VTE prophylaxis: SCDs/TEDs and enoxaparin ppx

## 2022-11-08 NOTE — PROGRESS NOTES
Pt AOX4, RA, Vitals WNL, N/V/diarrhea. Ambulates 4/10 pain. NPO- sips of water for pills. Bolus of NS- 1000 ml and NS 83ml/hr. Continue to manage pain

## 2022-11-08 NOTE — PROGRESS NOTES
4 Eyes Skin Assessment Completed by Parviz RN and FAYE Elam.    Head WDL  Ears WDL  Nose WDL  Mouth WDL  Neck WDL  Breast/Chest WDL  Shoulder Blades WDL  Spine WDL  (R) Arm/Elbow/Hand WDL  (L) Arm/Elbow/Hand WDL  Abdomen WDL  Groin WDL  Scrotum/Coccyx/Buttocks WDL  (R) Leg WDL  (L) Leg WDL  (R) Heel/Foot/Toe WDL  (L) Heel/Foot/Toe WDL          Devices In Places Blood Pressure Cuff and Pulse Ox      Interventions In Place N/A    Possible Skin Injury No    Pictures Uploaded Into Epic N/A  Wound Consult Placed N/A  RN Wound Prevention Protocol Ordered No

## 2023-08-15 NOTE — ED NOTES
Neurosx appt scheduled for tomorrow.  Pcp hospital follow up scheduled- pt was added to the wait list to hopefully get a sooner appt  Pt was [facility] . 1st visit 8/18    Pt is clear for DC from          08/15/23 4286   Final Note   Assessment Type Final Discharge Note   Anticipated Discharge Disposition [facility]   Hospital Resources/Appts/Education Provided Appointments scheduled and added to AVS;Post-Acute resouces added to AVS   Post-Acute Status   Post-Acute Authorization Home Health   Home Health Status Set-up Complete/Auth obtained        Pt ambulate to room with steady gait, agree with triage notes. Pt reports unable to keep anything down for 5 days, reports arthritis is acting up with the recent N/V.   Chart up for ERP

## 2023-10-23 ENCOUNTER — HOSPITAL ENCOUNTER (INPATIENT)
Facility: MEDICAL CENTER | Age: 43
LOS: 1 days | DRG: 387 | End: 2023-10-27
Attending: EMERGENCY MEDICINE | Admitting: STUDENT IN AN ORGANIZED HEALTH CARE EDUCATION/TRAINING PROGRAM
Payer: COMMERCIAL

## 2023-10-23 ENCOUNTER — APPOINTMENT (OUTPATIENT)
Dept: RADIOLOGY | Facility: MEDICAL CENTER | Age: 43
DRG: 387 | End: 2023-10-23
Attending: EMERGENCY MEDICINE
Payer: COMMERCIAL

## 2023-10-23 DIAGNOSIS — K50.119 CROHN'S COLITIS, UNSPECIFIED COMPLICATION (HCC): ICD-10-CM

## 2023-10-23 DIAGNOSIS — K50.80 CROHN'S DISEASE OF BOTH SMALL AND LARGE INTESTINE WITHOUT COMPLICATION (HCC): ICD-10-CM

## 2023-10-23 DIAGNOSIS — K50.919 CROHN'S DISEASE WITH COMPLICATION, UNSPECIFIED GASTROINTESTINAL TRACT LOCATION (HCC): ICD-10-CM

## 2023-10-23 DIAGNOSIS — I10 PRIMARY HYPERTENSION: ICD-10-CM

## 2023-10-23 PROBLEM — R11.2 INTRACTABLE NAUSEA AND VOMITING: Status: ACTIVE | Noted: 2023-10-23

## 2023-10-23 PROBLEM — R19.7 NAUSEA VOMITING AND DIARRHEA: Status: ACTIVE | Noted: 2023-10-23

## 2023-10-23 PROBLEM — R19.7 DIARRHEA: Status: ACTIVE | Noted: 2023-10-23

## 2023-10-23 PROBLEM — E86.0 DEHYDRATION: Status: ACTIVE | Noted: 2023-10-23

## 2023-10-23 PROBLEM — R11.2 NAUSEA VOMITING AND DIARRHEA: Status: ACTIVE | Noted: 2023-10-23

## 2023-10-23 LAB
ALBUMIN SERPL BCP-MCNC: 3.9 G/DL (ref 3.2–4.9)
ALBUMIN/GLOB SERPL: 1 G/DL
ALP SERPL-CCNC: 81 U/L (ref 30–99)
ALT SERPL-CCNC: 14 U/L (ref 2–50)
ANION GAP SERPL CALC-SCNC: 11 MMOL/L (ref 7–16)
APPEARANCE UR: CLEAR
AST SERPL-CCNC: 23 U/L (ref 12–45)
BACTERIA #/AREA URNS HPF: NEGATIVE /HPF
BASOPHILS # BLD AUTO: 0.4 % (ref 0–1.8)
BASOPHILS # BLD: 0.04 K/UL (ref 0–0.12)
BILIRUB SERPL-MCNC: <0.2 MG/DL (ref 0.1–1.5)
BILIRUB UR QL STRIP.AUTO: NEGATIVE
BUN SERPL-MCNC: 8 MG/DL (ref 8–22)
CALCIUM ALBUM COR SERPL-MCNC: 9.4 MG/DL (ref 8.5–10.5)
CALCIUM SERPL-MCNC: 9.3 MG/DL (ref 8.5–10.5)
CHLORIDE SERPL-SCNC: 104 MMOL/L (ref 96–112)
CO2 SERPL-SCNC: 23 MMOL/L (ref 20–33)
COLOR UR: YELLOW
CREAT SERPL-MCNC: 0.63 MG/DL (ref 0.5–1.4)
CRP SERPL HS-MCNC: 0.91 MG/DL (ref 0–0.75)
EOSINOPHIL # BLD AUTO: 0.22 K/UL (ref 0–0.51)
EOSINOPHIL NFR BLD: 2.4 % (ref 0–6.9)
EPI CELLS #/AREA URNS HPF: NORMAL /HPF
ERYTHROCYTE [DISTWIDTH] IN BLOOD BY AUTOMATED COUNT: 49.6 FL (ref 35.9–50)
ERYTHROCYTE [SEDIMENTATION RATE] IN BLOOD BY WESTERGREN METHOD: 30 MM/HOUR (ref 0–25)
GFR SERPLBLD CREATININE-BSD FMLA CKD-EPI: 113 ML/MIN/1.73 M 2
GLOBULIN SER CALC-MCNC: 3.9 G/DL (ref 1.9–3.5)
GLUCOSE SERPL-MCNC: 95 MG/DL (ref 65–99)
GLUCOSE UR STRIP.AUTO-MCNC: NEGATIVE MG/DL
HCG SERPL QL: NEGATIVE
HCT VFR BLD AUTO: 38.9 % (ref 37–47)
HGB BLD-MCNC: 12 G/DL (ref 12–16)
HYALINE CASTS #/AREA URNS LPF: NORMAL /LPF
IMM GRANULOCYTES # BLD AUTO: 0.01 K/UL (ref 0–0.11)
IMM GRANULOCYTES NFR BLD AUTO: 0.1 % (ref 0–0.9)
KETONES UR STRIP.AUTO-MCNC: NEGATIVE MG/DL
LEUKOCYTE ESTERASE UR QL STRIP.AUTO: ABNORMAL
LIPASE SERPL-CCNC: 39 U/L (ref 11–82)
LYMPHOCYTES # BLD AUTO: 2.39 K/UL (ref 1–4.8)
LYMPHOCYTES NFR BLD: 26.1 % (ref 22–41)
MCH RBC QN AUTO: 26.3 PG (ref 27–33)
MCHC RBC AUTO-ENTMCNC: 30.8 G/DL (ref 32.2–35.5)
MCV RBC AUTO: 85.3 FL (ref 81.4–97.8)
MICRO URNS: ABNORMAL
MONOCYTES # BLD AUTO: 0.55 K/UL (ref 0–0.85)
MONOCYTES NFR BLD AUTO: 6 % (ref 0–13.4)
NEUTROPHILS # BLD AUTO: 5.94 K/UL (ref 1.82–7.42)
NEUTROPHILS NFR BLD: 65 % (ref 44–72)
NITRITE UR QL STRIP.AUTO: NEGATIVE
NRBC # BLD AUTO: 0 K/UL
NRBC BLD-RTO: 0 /100 WBC (ref 0–0.2)
PH UR STRIP.AUTO: 5.5 [PH] (ref 5–8)
PLATELET # BLD AUTO: 261 K/UL (ref 164–446)
PMV BLD AUTO: 11 FL (ref 9–12.9)
POTASSIUM SERPL-SCNC: 3.3 MMOL/L (ref 3.6–5.5)
PROT SERPL-MCNC: 7.8 G/DL (ref 6–8.2)
PROT UR QL STRIP: NEGATIVE MG/DL
RBC # BLD AUTO: 4.56 M/UL (ref 4.2–5.4)
RBC # URNS HPF: NORMAL /HPF
RBC UR QL AUTO: ABNORMAL
SODIUM SERPL-SCNC: 138 MMOL/L (ref 135–145)
SP GR UR STRIP.AUTO: 1.01
UROBILINOGEN UR STRIP.AUTO-MCNC: 0.2 MG/DL
WBC # BLD AUTO: 9.2 K/UL (ref 4.8–10.8)
WBC #/AREA URNS HPF: NORMAL /HPF

## 2023-10-23 PROCEDURE — 74177 CT ABD & PELVIS W/CONTRAST: CPT

## 2023-10-23 PROCEDURE — 96375 TX/PRO/DX INJ NEW DRUG ADDON: CPT

## 2023-10-23 PROCEDURE — 99285 EMERGENCY DEPT VISIT HI MDM: CPT

## 2023-10-23 PROCEDURE — 700111 HCHG RX REV CODE 636 W/ 250 OVERRIDE (IP): Mod: JZ | Performed by: EMERGENCY MEDICINE

## 2023-10-23 PROCEDURE — 85025 COMPLETE CBC W/AUTO DIFF WBC: CPT

## 2023-10-23 PROCEDURE — 700102 HCHG RX REV CODE 250 W/ 637 OVERRIDE(OP): Performed by: STUDENT IN AN ORGANIZED HEALTH CARE EDUCATION/TRAINING PROGRAM

## 2023-10-23 PROCEDURE — 83690 ASSAY OF LIPASE: CPT

## 2023-10-23 PROCEDURE — 96372 THER/PROPH/DIAG INJ SC/IM: CPT

## 2023-10-23 PROCEDURE — 81001 URINALYSIS AUTO W/SCOPE: CPT

## 2023-10-23 PROCEDURE — 99223 1ST HOSP IP/OBS HIGH 75: CPT | Mod: AI | Performed by: STUDENT IN AN ORGANIZED HEALTH CARE EDUCATION/TRAINING PROGRAM

## 2023-10-23 PROCEDURE — 36415 COLL VENOUS BLD VENIPUNCTURE: CPT

## 2023-10-23 PROCEDURE — 700117 HCHG RX CONTRAST REV CODE 255: Performed by: EMERGENCY MEDICINE

## 2023-10-23 PROCEDURE — 96374 THER/PROPH/DIAG INJ IV PUSH: CPT

## 2023-10-23 PROCEDURE — G0378 HOSPITAL OBSERVATION PER HR: HCPCS

## 2023-10-23 PROCEDURE — 700105 HCHG RX REV CODE 258: Performed by: EMERGENCY MEDICINE

## 2023-10-23 PROCEDURE — 84703 CHORIONIC GONADOTROPIN ASSAY: CPT

## 2023-10-23 PROCEDURE — 80053 COMPREHEN METABOLIC PANEL: CPT

## 2023-10-23 PROCEDURE — A9270 NON-COVERED ITEM OR SERVICE: HCPCS | Performed by: STUDENT IN AN ORGANIZED HEALTH CARE EDUCATION/TRAINING PROGRAM

## 2023-10-23 PROCEDURE — 85652 RBC SED RATE AUTOMATED: CPT

## 2023-10-23 PROCEDURE — 700111 HCHG RX REV CODE 636 W/ 250 OVERRIDE (IP): Performed by: STUDENT IN AN ORGANIZED HEALTH CARE EDUCATION/TRAINING PROGRAM

## 2023-10-23 PROCEDURE — 86140 C-REACTIVE PROTEIN: CPT

## 2023-10-23 PROCEDURE — 700105 HCHG RX REV CODE 258: Performed by: STUDENT IN AN ORGANIZED HEALTH CARE EDUCATION/TRAINING PROGRAM

## 2023-10-23 RX ORDER — ACETAMINOPHEN 325 MG/1
650 TABLET ORAL EVERY 6 HOURS PRN
Status: DISCONTINUED | OUTPATIENT
Start: 2023-10-23 | End: 2023-10-27 | Stop reason: HOSPADM

## 2023-10-23 RX ORDER — AMOXICILLIN 250 MG
2 CAPSULE ORAL 2 TIMES DAILY
Status: DISCONTINUED | OUTPATIENT
Start: 2023-10-23 | End: 2023-10-25

## 2023-10-23 RX ORDER — GINSENG 100 MG
50 CAPSULE ORAL DAILY
COMMUNITY

## 2023-10-23 RX ORDER — OXYCODONE HYDROCHLORIDE 5 MG/1
2.5 TABLET ORAL
Status: DISCONTINUED | OUTPATIENT
Start: 2023-10-23 | End: 2023-10-27 | Stop reason: HOSPADM

## 2023-10-23 RX ORDER — ASCORBIC ACID 500 MG
500 TABLET ORAL DAILY
COMMUNITY
End: 2023-11-29

## 2023-10-23 RX ORDER — M-VIT,TX,IRON,MINS/CALC/FOLIC 27MG-0.4MG
1 TABLET ORAL DAILY
COMMUNITY

## 2023-10-23 RX ORDER — HYDRALAZINE HYDROCHLORIDE 20 MG/ML
20 INJECTION INTRAMUSCULAR; INTRAVENOUS EVERY 4 HOURS PRN
Status: DISCONTINUED | OUTPATIENT
Start: 2023-10-23 | End: 2023-10-27 | Stop reason: HOSPADM

## 2023-10-23 RX ORDER — OXYCODONE HYDROCHLORIDE 5 MG/1
5 TABLET ORAL
Status: DISCONTINUED | OUTPATIENT
Start: 2023-10-23 | End: 2023-10-27 | Stop reason: HOSPADM

## 2023-10-23 RX ORDER — MAGNESIUM OXIDE 400 MG/1
400 TABLET ORAL DAILY
COMMUNITY

## 2023-10-23 RX ORDER — MORPHINE SULFATE 4 MG/ML
4 INJECTION INTRAVENOUS ONCE
Status: COMPLETED | OUTPATIENT
Start: 2023-10-23 | End: 2023-10-23

## 2023-10-23 RX ORDER — ENOXAPARIN SODIUM 100 MG/ML
40 INJECTION SUBCUTANEOUS DAILY
Status: DISCONTINUED | OUTPATIENT
Start: 2023-10-23 | End: 2023-10-27 | Stop reason: HOSPADM

## 2023-10-23 RX ORDER — VITAMIN B COMPLEX
1000 TABLET ORAL DAILY
COMMUNITY

## 2023-10-23 RX ORDER — ZINC OXIDE 13 %
1 CREAM (GRAM) TOPICAL DAILY
COMMUNITY

## 2023-10-23 RX ORDER — POLYETHYLENE GLYCOL 3350 17 G/17G
1 POWDER, FOR SOLUTION ORAL
Status: DISCONTINUED | OUTPATIENT
Start: 2023-10-23 | End: 2023-10-25

## 2023-10-23 RX ORDER — CHOLECALCIFEROL (VITAMIN D3) 125 MCG
500 CAPSULE ORAL DAILY
COMMUNITY

## 2023-10-23 RX ORDER — SODIUM CHLORIDE, SODIUM LACTATE, POTASSIUM CHLORIDE, CALCIUM CHLORIDE 600; 310; 30; 20 MG/100ML; MG/100ML; MG/100ML; MG/100ML
1000 INJECTION, SOLUTION INTRAVENOUS ONCE
Status: COMPLETED | OUTPATIENT
Start: 2023-10-23 | End: 2023-10-23

## 2023-10-23 RX ORDER — HYDROMORPHONE HYDROCHLORIDE 1 MG/ML
0.25 INJECTION, SOLUTION INTRAMUSCULAR; INTRAVENOUS; SUBCUTANEOUS
Status: DISCONTINUED | OUTPATIENT
Start: 2023-10-23 | End: 2023-10-27 | Stop reason: HOSPADM

## 2023-10-23 RX ORDER — SODIUM CHLORIDE, SODIUM LACTATE, POTASSIUM CHLORIDE, CALCIUM CHLORIDE 600; 310; 30; 20 MG/100ML; MG/100ML; MG/100ML; MG/100ML
INJECTION, SOLUTION INTRAVENOUS CONTINUOUS
Status: ACTIVE | OUTPATIENT
Start: 2023-10-23 | End: 2023-10-24

## 2023-10-23 RX ORDER — BISACODYL 10 MG
10 SUPPOSITORY, RECTAL RECTAL
Status: DISCONTINUED | OUTPATIENT
Start: 2023-10-23 | End: 2023-10-25

## 2023-10-23 RX ORDER — LABETALOL HYDROCHLORIDE 5 MG/ML
10 INJECTION, SOLUTION INTRAVENOUS EVERY 4 HOURS PRN
Status: DISCONTINUED | OUTPATIENT
Start: 2023-10-23 | End: 2023-10-23

## 2023-10-23 RX ORDER — ONDANSETRON 2 MG/ML
4 INJECTION INTRAMUSCULAR; INTRAVENOUS ONCE
Status: COMPLETED | OUTPATIENT
Start: 2023-10-23 | End: 2023-10-23

## 2023-10-23 RX ORDER — AMLODIPINE BESYLATE 10 MG/1
10 TABLET ORAL
Status: DISCONTINUED | OUTPATIENT
Start: 2023-10-23 | End: 2023-10-27 | Stop reason: HOSPADM

## 2023-10-23 RX ADMIN — MORPHINE SULFATE 4 MG: 4 INJECTION, SOLUTION INTRAMUSCULAR; INTRAVENOUS at 14:10

## 2023-10-23 RX ADMIN — LABETALOL HYDROCHLORIDE 10 MG: 5 INJECTION INTRAVENOUS at 16:54

## 2023-10-23 RX ADMIN — ENOXAPARIN SODIUM 40 MG: 100 INJECTION SUBCUTANEOUS at 19:32

## 2023-10-23 RX ADMIN — AMLODIPINE BESYLATE 10 MG: 10 TABLET ORAL at 19:31

## 2023-10-23 RX ADMIN — IOHEXOL 100 ML: 350 INJECTION, SOLUTION INTRAVENOUS at 16:28

## 2023-10-23 RX ADMIN — OXYCODONE HYDROCHLORIDE 5 MG: 5 TABLET ORAL at 19:31

## 2023-10-23 RX ADMIN — ONDANSETRON 4 MG: 2 INJECTION INTRAMUSCULAR; INTRAVENOUS at 14:02

## 2023-10-23 RX ADMIN — SODIUM CHLORIDE, POTASSIUM CHLORIDE, SODIUM LACTATE AND CALCIUM CHLORIDE 1000 ML: 600; 310; 30; 20 INJECTION, SOLUTION INTRAVENOUS at 14:02

## 2023-10-23 RX ADMIN — IOHEXOL 25 ML: 240 INJECTION, SOLUTION INTRATHECAL; INTRAVASCULAR; INTRAVENOUS; ORAL at 16:45

## 2023-10-23 RX ADMIN — SODIUM CHLORIDE, POTASSIUM CHLORIDE, SODIUM LACTATE AND CALCIUM CHLORIDE: 600; 310; 30; 20 INJECTION, SOLUTION INTRAVENOUS at 16:56

## 2023-10-23 ASSESSMENT — ENCOUNTER SYMPTOMS
DIARRHEA: 1
ABDOMINAL PAIN: 1
VOMITING: 1
NAUSEA: 1
BLOOD IN STOOL: 0

## 2023-10-23 ASSESSMENT — PAIN DESCRIPTION - PAIN TYPE
TYPE: CHRONIC PAIN;ACUTE PAIN
TYPE: ACUTE PAIN
TYPE: ACUTE PAIN;CHRONIC PAIN

## 2023-10-23 ASSESSMENT — COGNITIVE AND FUNCTIONAL STATUS - GENERAL
SUGGESTED CMS G CODE MODIFIER MOBILITY: CH
MOBILITY SCORE: 24
DAILY ACTIVITIY SCORE: 24
SUGGESTED CMS G CODE MODIFIER DAILY ACTIVITY: CH

## 2023-10-23 ASSESSMENT — LIFESTYLE VARIABLES
EVER FELT BAD OR GUILTY ABOUT YOUR DRINKING: NO
TOTAL SCORE: 0
TOTAL SCORE: 0
HAVE PEOPLE ANNOYED YOU BY CRITICIZING YOUR DRINKING: NO
DOES PATIENT WANT TO STOP DRINKING: NO
ON A TYPICAL DAY WHEN YOU DRINK ALCOHOL HOW MANY DRINKS DO YOU HAVE: 0
EVER HAD A DRINK FIRST THING IN THE MORNING TO STEADY YOUR NERVES TO GET RID OF A HANGOVER: NO
HAVE YOU EVER FELT YOU SHOULD CUT DOWN ON YOUR DRINKING: NO
TOTAL SCORE: 0
ALCOHOL_USE: NO
HOW MANY TIMES IN THE PAST YEAR HAVE YOU HAD 5 OR MORE DRINKS IN A DAY: 0
CONSUMPTION TOTAL: NEGATIVE
AVERAGE NUMBER OF DAYS PER WEEK YOU HAVE A DRINK CONTAINING ALCOHOL: 0

## 2023-10-23 ASSESSMENT — FIBROSIS 4 INDEX
FIB4 SCORE: 1.01
FIB4 SCORE: 0.48

## 2023-10-23 ASSESSMENT — PATIENT HEALTH QUESTIONNAIRE - PHQ9
2. FEELING DOWN, DEPRESSED, IRRITABLE, OR HOPELESS: NOT AT ALL
SUM OF ALL RESPONSES TO PHQ9 QUESTIONS 1 AND 2: 0
1. LITTLE INTEREST OR PLEASURE IN DOING THINGS: NOT AT ALL

## 2023-10-23 NOTE — H&P
Hospital Medicine History & Physical Note    Date of Service  10/23/2023    Primary Care Physician  Rosalind Borja M.D.    Consultants  GI    Specialist Names: Dr Dorsey     Code Status  Prior    Chief Complaint  Chief Complaint   Patient presents with    Nausea/Vomiting/Diarrhea     Since Wednesday. Patient states she is unable to hold down water     Abdominal Cramping     X4 days. Patient reports history of Crohns       History of Presenting Illness  Mi Bay is a 43 y.o. female with past medical history of Crohn's disease on Humira, hepatitis C, who presented 10/23/2023 with 4 days history of worsening abdominal pain associated with nausea, vomiting, watery diarrhea.  Denies fever, melena, chest pain, shortness of breath.  Reports she receives Humira every week.  She was last hospitalized for Crohn's flareup on 11/8/2022.    On arrival to ED, remained hypertensive.   Labs reviewed and noted with normal white count, hypokalemia potassium 3.3, slightly elevated CRP.  CT abdomen pelvis with contrast pending.      Gastroenterology  consulted recommended to get CT abdomen pelvis, ESR CRP.  Plan is to start history depending on CT finding    I spoke and discussed the case with the ER physician, Dr. Barry .    Patient will be admitted to the hospital for further evaluation and management for persistent nausea vomiting associated with watery diarrhea.  Need further work-up to rule out Crohn's flareup.  CT abdomen pelvis and C- dif PCR ordered.Need close monitoring of blood counts, electrolytes and renal function         I discussed the plan of care with patient.    Review of Systems  Review of Systems   Gastrointestinal:  Positive for abdominal pain, diarrhea, nausea and vomiting. Negative for blood in stool and melena.       Past Medical History   has a past medical history of Anxiety, Crohn's disease (HCC), Depression, Hepatitis C, IBD (inflammatory bowel disease), and PNA (pneumonia).    Surgical  History   has a past surgical history that includes colonoscopy with biopsy (N/A, 6/21/2016).     Family History  family history includes Bipolar disorder in her father; Diabetes in her maternal grandmother; Thyroid in her mother.   Family history reviewed with patient. There is no family history that is pertinent to the chief complaint.     Social History   reports that she has never smoked. She has never used smokeless tobacco. She reports that she does not drink alcohol and does not use drugs.    Allergies  Allergies   Allergen Reactions    Phenergan [Promethazine]      Anxiety      Seroquel [Quetiapine] Itching    Trazodone Itching       Medications  Prior to Admission Medications   Prescriptions Last Dose Informant Patient Reported? Taking?   Adalimumab (HUMIRA) 40 MG/0.4ML Prefilled Syringe Kit   Yes No   Sig: Inject 40 mg under the skin every 7 days. Indications: Crohn's Disease   ondansetron (ZOFRAN ODT) 4 MG TABLET DISPERSIBLE   No No   Sig: Take 1 Tab by mouth every four hours as needed for Nausea (give PO if no IV route available).      Facility-Administered Medications: None       Physical Exam  Temp:  [36.7 °C (98 °F)] 36.7 °C (98 °F)  Pulse:  [83-86] 83  Resp:  [16] 16  BP: (159-160)/() 159/92  SpO2:  [96 %-99 %] 96 %  Blood Pressure: (!) 159/92   Temperature: 36.7 °C (98 °F)   Pulse: 83   Respiration: 16   Pulse Oximetry: 96 %       Physical Exam  Constitutional:       Appearance: Normal appearance.   HENT:      Mouth/Throat:      Mouth: Mucous membranes are dry.   Cardiovascular:      Rate and Rhythm: Normal rate and regular rhythm.      Pulses: Normal pulses.      Heart sounds: Normal heart sounds.   Pulmonary:      Effort: Pulmonary effort is normal.      Breath sounds: Normal breath sounds.   Abdominal:      General: Abdomen is flat.      Tenderness: There is abdominal tenderness (Mild diffuse tenderness).   Musculoskeletal:      Right lower leg: No edema.      Left lower leg: No edema.  "  Skin:     General: Skin is warm.   Neurological:      General: No focal deficit present.      Mental Status: She is alert and oriented to person, place, and time.   Psychiatric:         Mood and Affect: Mood normal.         Laboratory:  Recent Labs     10/23/23  1254   WBC 9.2   RBC 4.56   HEMOGLOBIN 12.0   HEMATOCRIT 38.9   MCV 85.3   MCH 26.3*   MCHC 30.8*   RDW 49.6   PLATELETCT 261   MPV 11.0     Recent Labs     10/23/23  1254   SODIUM 138   POTASSIUM 3.3*   CHLORIDE 104   CO2 23   GLUCOSE 95   BUN 8   CREATININE 0.63   CALCIUM 9.3     Recent Labs     10/23/23  1254   ALTSGPT 14   ASTSGOT 23   ALKPHOSPHAT 81   TBILIRUBIN <0.2   LIPASE 39   GLUCOSE 95         No results for input(s): \"NTPROBNP\" in the last 72 hours.      No results for input(s): \"TROPONINT\" in the last 72 hours.    Imaging:  CT-ABDOMEN-PELVIS WITH    (Results Pending)       X-Ray:  I have personally reviewed the images and compared with prior images.  EKG:  I have personally reviewed the images and compared with prior images.    Assessment/Plan:  Justification for Admission Status  I anticipate this patient will require at observational status for appropriate medical management for persistent nausea vomiting associated with watery diarrhea.  Need further work-up to rule out Crohn's flareup.  CT abdomen pelvis and C- dif PCR ordered.Need close monitoring of blood counts, electrolytes and renal function         * Crohn's disease (HCC)- (present on admission)  Assessment & Plan  Crohn's disease on Humira  Concern for chronic flare up   Continue IV fluid   Hold steroid pending CT abdomen  Requiring IV narcotics for pain management.  Monitor for respiratory toxicity while on IV narcotics  GI been consulted      Dehydration  Assessment & Plan  Continue on IV fluid    Intractable nausea and vomiting  Assessment & Plan  On IV fluid  Requiring IV antiemetic    Diarrhea  Assessment & Plan  Concern for Crohn's flareup  Get CT PCR  Get CT abdomen " pelvis        VTE prophylaxis: SCDs/TEDs and enoxaparin ppx    I independently reviewed pertinent clinical lab tests since admission and ordered additional follow up clinical lab tests.  Admission order was placed.  Labs ordered for follow-up.  I independently reviewed pertinent radiographic images and the radiologist's reports since admission and ordered additional follow up radiographic studies.  The details of the available patient records in [facility] (including laboratory tests, culture data, medications, imaging, and other pertinent diagnostic tests) and that information was utilized as warranted in today's medical decision making for this patient.  I personally evaluated the patient condition at bedside.     Care interventions include:   Review of interval medical and surgical history, current medications and outpatient medication reconciliation, interval imaging studies and radiologist interpretation, and interval laboratory values.

## 2023-10-23 NOTE — ASSESSMENT & PLAN NOTE
Concern for Crohn's flareup  CRP elevated  CT abdomen pelvis reviewed  Continue supportive care, GI consulted

## 2023-10-23 NOTE — PROGRESS NOTES
Call from ER physician received regarding patient. Concern about symptoms related to possible crohn's. Last CT 10/20/2022  CRP and ESR ordered.   Diff panel ordered r/o infectious etiology  Rec patient admitted with CT abdomen, dvt prophylaxis and GI will see tomorrow.

## 2023-10-23 NOTE — ASSESSMENT & PLAN NOTE
Crohn's disease on Humira  Concern for chronic flare up   Continue IV fluid   Hold steroid pending CT abdomen  Requiring IV narcotics for pain management.  Monitor for respiratory toxicity while on IV narcotics  GI been consulted, plan for EGD/colonoscopy today

## 2023-10-23 NOTE — ED NOTES
Med rec updated and complete. Allergies reviewed . Confirmed name and date of birth.  Interviewed pt at bedside.  Pt denies anticoagulant  and antiplatelet medications.  No outpatient antibiotic medications.  Last humira injection 10/17/23      Home pharmacy  [facility] [phone]

## 2023-10-23 NOTE — ED PROVIDER NOTES
ED PHYSICIAN NOTE    CHIEF COMPLAINT  Chief Complaint   Patient presents with    Nausea/Vomiting/Diarrhea     Since Wednesday. Patient states she is unable to hold down water     Abdominal Cramping     X4 days. Patient reports history of Crohns       EXTERNAL RECORDS REVIEWED  Inpatient Notes patient was admitted to this hospital about 1 year ago with vomiting and diarrhea attributed to Crohn's flare.  Patient was started on a prolonged tapering course of prednisone.  She reports improvement    HPI/ROS      Mi Bay is a 43 y.o. female who presents with vomiting and diarrhea.  Per started getting sick to her stomach 5 days ago.  About 3 days ago she started throwing up.  She vomits after almost everything she eats.  Nonbloody none bilious emesis.  She has had yellow mucus stool but very limited volume because she has not been eating anything.  No bloody stool.  She has vague abdominal cramping that slightly worse in the left side than on the right.  She has not had a fever.  No abnormal foods.  No known ill exposure.  No travel out of the country.    PAST MEDICAL HISTORY  Past Medical History:   Diagnosis Date    Anxiety     Crohn's disease (HCC)     Depression     Hepatitis C     IBD (inflammatory bowel disease)     PNA (pneumonia)        SOCIAL HISTORY  Social History     Tobacco Use    Smoking status: Never    Smokeless tobacco: Never   Vaping Use    Vaping Use: Never used   Substance Use Topics    Alcohol use: No     Comment: last use 7/17/2016; was daily    Drug use: No       CURRENT MEDICATIONS  Home Medications    **Home medications have not yet been reviewed for this encounter**         ALLERGIES  Allergies   Allergen Reactions    Phenergan [Promethazine]      Anxiety      Seroquel [Quetiapine] Itching    Trazodone Itching       PHYSICAL EXAM  VITAL SIGNS: BP (!) 160/100   Pulse 86   Temp 36.7 °C (98 °F) (Temporal)   Resp 16   Wt 59.6 kg (131 lb 6.3 oz)   SpO2 99%   BMI 25.66 kg/m²     Constitutional: Awake and alert  HENT: Normal inspection  Eyes: Normal inspection  Neck: Grossly normal range of motion.  Cardiovascular: Normal heart rate, Normal rhythm.  Symmetric peripheral pulses.   Thorax & Lungs: No respiratory distress, No wheezing, No rales, No rhonchi, No chest tenderness.   Abdomen: Bowel sounds normal, soft, non-distended, minimal tenderness diffusely more so in the left abdomen.  No rebound or peritonitis.  No guarding.    Skin: No obvious rash.  Back: No tenderness, No CVA tenderness.   Extremities: No clubbing, cyanosis, edema, no Homans or cords.  Neurologic: Grossly normal   Psychiatric: Normal for situation     DIAGNOSTIC STUDIES / PROCEDURES  LABS/EKG  Results for orders placed or performed during the hospital encounter of 10/23/23   CBC WITH DIFFERENTIAL   Result Value Ref Range    WBC 9.2 4.8 - 10.8 K/uL    RBC 4.56 4.20 - 5.40 M/uL    Hemoglobin 12.0 12.0 - 16.0 g/dL    Hematocrit 38.9 37.0 - 47.0 %    MCV 85.3 81.4 - 97.8 fL    MCH 26.3 (L) 27.0 - 33.0 pg    MCHC 30.8 (L) 32.2 - 35.5 g/dL    RDW 49.6 35.9 - 50.0 fL    Platelet Count 261 164 - 446 K/uL    MPV 11.0 9.0 - 12.9 fL    Neutrophils-Polys 65.00 44.00 - 72.00 %    Lymphocytes 26.10 22.00 - 41.00 %    Monocytes 6.00 0.00 - 13.40 %    Eosinophils 2.40 0.00 - 6.90 %    Basophils 0.40 0.00 - 1.80 %    Immature Granulocytes 0.10 0.00 - 0.90 %    Nucleated RBC 0.00 0.00 - 0.20 /100 WBC    Neutrophils (Absolute) 5.94 1.82 - 7.42 K/uL    Lymphs (Absolute) 2.39 1.00 - 4.80 K/uL    Monos (Absolute) 0.55 0.00 - 0.85 K/uL    Eos (Absolute) 0.22 0.00 - 0.51 K/uL    Baso (Absolute) 0.04 0.00 - 0.12 K/uL    Immature Granulocytes (abs) 0.01 0.00 - 0.11 K/uL    NRBC (Absolute) 0.00 K/uL   URINALYSIS,CULTURE IF INDICATED    Specimen: Urine   Result Value Ref Range    Micro Urine Req Microscopic       I have independently interpreted this EKG      RADIOLOGY  I have independently interpreted the diagnostic imaging associated with this  visit and am waiting the final reading from the radiologist.   My preliminary interpretation is as follows: CT scan ordered at request of gastroenterology  Radiologist interpretation:   No orders to display         COURSE & MEDICAL DECISION MAKING    INITIAL ASSESSMENT, COURSE AND PLAN  Care Narrative: Patient presents with nausea vomiting and diarrhea in the setting of known Crohn disease.  She is on Humira..  She does not have any severe abdominal tenderness.  Her vital signs were normal.  I ordered IV fluids and antiemetic.  She complained of increasing pain and a dose of morphine.  Laboratory data was collected.  Will wait on labs and response to treatment to determine need for imaging.    Patient was feeling improved on treatment.  Oratory data was reassuring.  Repeat abdominal exam was benign.  Consider discharge with treatment for Crohn's flare, but will need to discuss with gastroenterology.  GI was paged.      discussed with Dr. Dorsey.  He requested the patient be admitted to the hospital and that CT abdomen and pelvis to be done.  CT scan was ordered.  Paged hospitalist for admission.          ADDITIONAL PROBLEM LIST  Past Medical History:   Diagnosis Date    Anxiety     Crohn's disease (HCC)     Depression     Hepatitis C     IBD (inflammatory bowel disease)     PNA (pneumonia)        DISPOSITION AND DISCUSSIONS  I have discussed management of the patient with the following physicians and MARSHA's: Dr. Chase, hospitalist      FINAL IMPRESSION  1.  Vomiting and diarrhea  2.  Suspected Crohn's flare    This dictation was created using voice recognition software. The accuracy of the dictation is limited to the abilities of the software. I expect there may be some errors of grammar and possibly content. The nursing notes were reviewed and certain aspects of this information were incorporated into this note.    Electronically signed by: Lenard Barry M.D., 10/23/2023

## 2023-10-24 LAB
ANION GAP SERPL CALC-SCNC: 11 MMOL/L (ref 7–16)
BUN SERPL-MCNC: 6 MG/DL (ref 8–22)
C DIFF DNA SPEC QL NAA+PROBE: NEGATIVE
C DIFF TOX A+B STL QL IA: NEGATIVE
C DIFF TOX GENS STL QL NAA+PROBE: NORMAL
CALCIUM SERPL-MCNC: 9.1 MG/DL (ref 8.5–10.5)
CHLORIDE SERPL-SCNC: 101 MMOL/L (ref 96–112)
CO2 SERPL-SCNC: 25 MMOL/L (ref 20–33)
CREAT SERPL-MCNC: 0.45 MG/DL (ref 0.5–1.4)
ERYTHROCYTE [DISTWIDTH] IN BLOOD BY AUTOMATED COUNT: 49.9 FL (ref 35.9–50)
GFR SERPLBLD CREATININE-BSD FMLA CKD-EPI: 122 ML/MIN/1.73 M 2
GLUCOSE SERPL-MCNC: 92 MG/DL (ref 65–99)
HCT VFR BLD AUTO: 41.9 % (ref 37–47)
HGB BLD-MCNC: 13.1 G/DL (ref 12–16)
MAGNESIUM SERPL-MCNC: 2.1 MG/DL (ref 1.5–2.5)
MCH RBC QN AUTO: 26.8 PG (ref 27–33)
MCHC RBC AUTO-ENTMCNC: 31.3 G/DL (ref 32.2–35.5)
MCV RBC AUTO: 85.7 FL (ref 81.4–97.8)
PLATELET # BLD AUTO: 210 K/UL (ref 164–446)
PMV BLD AUTO: 10.8 FL (ref 9–12.9)
POTASSIUM SERPL-SCNC: 3.5 MMOL/L (ref 3.6–5.5)
RBC # BLD AUTO: 4.89 M/UL (ref 4.2–5.4)
SODIUM SERPL-SCNC: 137 MMOL/L (ref 135–145)
WBC # BLD AUTO: 7.9 K/UL (ref 4.8–10.8)

## 2023-10-24 PROCEDURE — 85027 COMPLETE CBC AUTOMATED: CPT

## 2023-10-24 PROCEDURE — 700105 HCHG RX REV CODE 258: Performed by: STUDENT IN AN ORGANIZED HEALTH CARE EDUCATION/TRAINING PROGRAM

## 2023-10-24 PROCEDURE — G0378 HOSPITAL OBSERVATION PER HR: HCPCS

## 2023-10-24 PROCEDURE — 700102 HCHG RX REV CODE 250 W/ 637 OVERRIDE(OP): Mod: JZ | Performed by: STUDENT IN AN ORGANIZED HEALTH CARE EDUCATION/TRAINING PROGRAM

## 2023-10-24 PROCEDURE — 87324 CLOSTRIDIUM AG IA: CPT

## 2023-10-24 PROCEDURE — 700102 HCHG RX REV CODE 250 W/ 637 OVERRIDE(OP): Performed by: STUDENT IN AN ORGANIZED HEALTH CARE EDUCATION/TRAINING PROGRAM

## 2023-10-24 PROCEDURE — A9270 NON-COVERED ITEM OR SERVICE: HCPCS | Mod: JZ | Performed by: STUDENT IN AN ORGANIZED HEALTH CARE EDUCATION/TRAINING PROGRAM

## 2023-10-24 PROCEDURE — 99232 SBSQ HOSP IP/OBS MODERATE 35: CPT | Performed by: STUDENT IN AN ORGANIZED HEALTH CARE EDUCATION/TRAINING PROGRAM

## 2023-10-24 PROCEDURE — 96375 TX/PRO/DX INJ NEW DRUG ADDON: CPT

## 2023-10-24 PROCEDURE — 700102 HCHG RX REV CODE 250 W/ 637 OVERRIDE(OP)

## 2023-10-24 PROCEDURE — 96376 TX/PRO/DX INJ SAME DRUG ADON: CPT

## 2023-10-24 PROCEDURE — 82784 ASSAY IGA/IGD/IGG/IGM EACH: CPT

## 2023-10-24 PROCEDURE — 83993 ASSAY FOR CALPROTECTIN FECAL: CPT

## 2023-10-24 PROCEDURE — 700111 HCHG RX REV CODE 636 W/ 250 OVERRIDE (IP): Performed by: STUDENT IN AN ORGANIZED HEALTH CARE EDUCATION/TRAINING PROGRAM

## 2023-10-24 PROCEDURE — 36415 COLL VENOUS BLD VENIPUNCTURE: CPT

## 2023-10-24 PROCEDURE — 80048 BASIC METABOLIC PNL TOTAL CA: CPT

## 2023-10-24 PROCEDURE — 86364 TISS TRNSGLTMNASE EA IG CLAS: CPT

## 2023-10-24 PROCEDURE — 83735 ASSAY OF MAGNESIUM: CPT

## 2023-10-24 PROCEDURE — 87493 C DIFF AMPLIFIED PROBE: CPT

## 2023-10-24 PROCEDURE — 700111 HCHG RX REV CODE 636 W/ 250 OVERRIDE (IP): Mod: JZ

## 2023-10-24 PROCEDURE — A9270 NON-COVERED ITEM OR SERVICE: HCPCS | Performed by: STUDENT IN AN ORGANIZED HEALTH CARE EDUCATION/TRAINING PROGRAM

## 2023-10-24 PROCEDURE — A9270 NON-COVERED ITEM OR SERVICE: HCPCS

## 2023-10-24 PROCEDURE — 99222 1ST HOSP IP/OBS MODERATE 55: CPT | Performed by: NURSE PRACTITIONER

## 2023-10-24 RX ORDER — PROCHLORPERAZINE EDISYLATE 5 MG/ML
10 INJECTION INTRAMUSCULAR; INTRAVENOUS EVERY 6 HOURS PRN
Status: DISCONTINUED | OUTPATIENT
Start: 2023-10-24 | End: 2023-10-27 | Stop reason: HOSPADM

## 2023-10-24 RX ORDER — ONDANSETRON 2 MG/ML
4 INJECTION INTRAMUSCULAR; INTRAVENOUS EVERY 4 HOURS PRN
Status: DISCONTINUED | OUTPATIENT
Start: 2023-10-24 | End: 2023-10-27 | Stop reason: HOSPADM

## 2023-10-24 RX ORDER — OXYCODONE HYDROCHLORIDE 5 MG/1
5 TABLET ORAL ONCE
Status: COMPLETED | OUTPATIENT
Start: 2023-10-24 | End: 2023-10-24

## 2023-10-24 RX ORDER — POTASSIUM CHLORIDE 20 MEQ/1
40 TABLET, EXTENDED RELEASE ORAL ONCE
Status: COMPLETED | OUTPATIENT
Start: 2023-10-24 | End: 2023-10-24

## 2023-10-24 RX ORDER — LABETALOL HYDROCHLORIDE 5 MG/ML
10 INJECTION, SOLUTION INTRAVENOUS EVERY 6 HOURS PRN
Status: DISCONTINUED | OUTPATIENT
Start: 2023-10-24 | End: 2023-10-27 | Stop reason: HOSPADM

## 2023-10-24 RX ADMIN — OXYCODONE HYDROCHLORIDE 5 MG: 5 TABLET ORAL at 07:21

## 2023-10-24 RX ADMIN — ONDANSETRON 4 MG: 2 INJECTION INTRAMUSCULAR; INTRAVENOUS at 07:21

## 2023-10-24 RX ADMIN — SODIUM CHLORIDE, POTASSIUM CHLORIDE, SODIUM LACTATE AND CALCIUM CHLORIDE: 600; 310; 30; 20 INJECTION, SOLUTION INTRAVENOUS at 02:41

## 2023-10-24 RX ADMIN — OXYCODONE HYDROCHLORIDE 5 MG: 5 TABLET ORAL at 15:14

## 2023-10-24 RX ADMIN — POTASSIUM CHLORIDE 40 MEQ: 1500 TABLET, EXTENDED RELEASE ORAL at 10:23

## 2023-10-24 RX ADMIN — ONDANSETRON 4 MG: 2 INJECTION INTRAMUSCULAR; INTRAVENOUS at 12:32

## 2023-10-24 RX ADMIN — HYDROMORPHONE HYDROCHLORIDE 0.25 MG: 1 INJECTION, SOLUTION INTRAMUSCULAR; INTRAVENOUS; SUBCUTANEOUS at 09:16

## 2023-10-24 RX ADMIN — PROCHLORPERAZINE EDISYLATE 10 MG: 5 INJECTION INTRAMUSCULAR; INTRAVENOUS at 15:43

## 2023-10-24 ASSESSMENT — PAIN DESCRIPTION - PAIN TYPE
TYPE: ACUTE PAIN
TYPE: ACUTE PAIN

## 2023-10-24 ASSESSMENT — ENCOUNTER SYMPTOMS
BLURRED VISION: 0
SHORTNESS OF BREATH: 0
SENSORY CHANGE: 0
BACK PAIN: 0
DIZZINESS: 0
PALPITATIONS: 0
BLOOD IN STOOL: 0
COUGH: 0
FOCAL WEAKNESS: 0
CHILLS: 0
NAUSEA: 1
CONSTIPATION: 0
HEADACHES: 0
EYE PAIN: 0
FEVER: 0
INSOMNIA: 0
DIARRHEA: 1
VOMITING: 1
ABDOMINAL PAIN: 1

## 2023-10-24 ASSESSMENT — LIFESTYLE VARIABLES: SUBSTANCE_ABUSE: 0

## 2023-10-24 NOTE — PROGRESS NOTES
4 Eyes Skin Assessment Completed by FAYE Villatoro and FAYE Patrick.    Head WDL  Ears WDL  Nose WDL  Mouth WDL  Neck WDL  Breast/Chest WDL  Shoulder Blades WDL  Spine WDL  (R) Arm/Elbow/Hand WDL  (L) Arm/Elbow/Hand WDL  Abdomen WDL  Groin WDL  Scrotum/Coccyx/Buttocks WDL  (R) Leg WDL  (L) Leg WDL  (R) Heel/Foot/Toe WDL  (L) Heel/Foot/Toe WDL          Devices In Places Blood Pressure Cuff, IV machine      Interventions In Place Pillows and Pressure Redistribution Mattress    Possible Skin Injury No    Pictures Uploaded Into Epic N/A  Wound Consult Placed N/A  RN Wound Prevention Protocol Ordered No

## 2023-10-24 NOTE — ED NOTES
Bedside report from Landon MCKINNEY. Pt resting on gurney, NAD noted. BP and pulse ox monitoring in place, pt currently on RA. Low fall risk. LR infusing.

## 2023-10-24 NOTE — ED NOTES
Bedside report given to FAYE Felix. Patient care transferred.  IVF infusing. Call light within reach. GCS 15.

## 2023-10-24 NOTE — CONSULTS
..Date of Consultation:  10/24/2023    Patient: : Mi Bay  MRN: [id number]    Referring Physician:  Dr. Marv Marquez     GI:BONNIE Peña     Reason for Consultation: Crohn's disease flare    History of Present Illness: Mi Bay is a 43-year-old female with past medical history of Crohn's disease on weekly Humira, hepatitis C who presented 10/23/2023 with chief complaint of worsening abdominal pain with nausea, vomiting, and watery diarrhea for 4 days.  Patient reports diagnosis of Crohn's almost 12 years ago.  Last hospitalized for Crohn's flareup in November 2022.  She reports compliance with her Humira and no changes in her life such as increased stress, new medications, or illness.  She does not drink alcohol, smoke cigarettes, or do drugs.  She is unable to tolerate any oral intake.  She denies hematochezia, melena, bloating, GERD, and endorses left lower quadrant abdominal pain.  She also reports associated joint pain and swelling especially in her ankles.  She says that this presentation is very similar to to the one she had in November.  She last took steroids in November when she was discharged on a 4-week prednisone taper.    Per chart review, colonoscopy with [facility] in 2016  PostOp Diagnosis:               1/ multiple pseudopolyps throughout the colon              2/ ulceration in the ileum              3/ biopsies taken of the ileum and then the ascending,transverse, descending, sigmoid and rectum    Patient is significantly hypertensive and afebrile.  Pertinent labs include sed rate 30, CRP 8.91.  CT scan abdomen pelvis reveals borderline bowel wall thickening along descending colon and sigmoid colon in the setting of nondistended bowel.  Small amount of free fluid in the cul-de-sac, nonspecific, likely related to ovulation.      Past Medical History:   Diagnosis Date    Anxiety     Crohn's disease (HCC)     Depression     Hepatitis C     IBD (inflammatory bowel disease)      PNA (pneumonia)          Past Surgical History:   Procedure Laterality Date    COLONOSCOPY WITH BIOPSY N/A 6/21/2016    Procedure: COLONOSCOPY WITH BIOPSY;  Surgeon: Jay Leggett M.D.;  Location: ENDOSCOPY [facility];  Service:        Family History   Problem Relation Age of Onset    Thyroid Mother     Bipolar disorder Father         father possibly bipolar    Diabetes Maternal Grandmother        Social History     Socioeconomic History    Marital status: Single   Tobacco Use    Smoking status: Never    Smokeless tobacco: Never   Vaping Use    Vaping Use: Never used   Substance and Sexual Activity    Alcohol use: No     Comment: last use 7/17/2016; was daily    Drug use: No    Sexual activity: Never       Review of systems:  ROS      Physical Exam:  Vitals:    10/23/23 2200 10/23/23 2305 10/24/23 0405 10/24/23 0722   BP: (!) 177/120 (!) 162/98 122/85 (!) 191/115   Pulse:   85 83   Resp:   16 18   Temp:   36.9 °C (98.4 °F) 36.1 °C (96.9 °F)   TempSrc:   Oral Oral   SpO2:   97% 97%   Weight:           Physical Exam  Vitals and nursing note reviewed.   Constitutional:       Appearance: Normal appearance. She is ill-appearing.   HENT:      Head: Normocephalic and atraumatic.      Right Ear: External ear normal.      Left Ear: External ear normal.      Nose: Nose normal.      Mouth/Throat:      Mouth: Mucous membranes are dry.      Pharynx: Oropharynx is clear.   Eyes:      General: No scleral icterus.  Cardiovascular:      Rate and Rhythm: Regular rhythm. Tachycardia present.      Pulses: Normal pulses.      Heart sounds: Normal heart sounds.   Pulmonary:      Effort: Pulmonary effort is normal. No respiratory distress.      Breath sounds: Normal breath sounds.   Abdominal:      General: Abdomen is flat. Bowel sounds are normal. There is no distension.      Palpations: Abdomen is soft.      Tenderness: There is abdominal tenderness (Left lower quadrant).   Musculoskeletal:         General: Tenderness  (Bilateral ankles) present. Normal range of motion.      Cervical back: Normal range of motion.   Skin:     General: Skin is warm and dry.      Capillary Refill: Capillary refill takes less than 2 seconds.   Neurological:      Mental Status: She is alert and oriented to person, place, and time.   Psychiatric:         Mood and Affect: Mood normal.         Behavior: Behavior normal.           Labs:  Recent Labs     10/23/23  1254 10/24/23  0520   WBC 9.2 7.9   RBC 4.56 4.89   HEMOGLOBIN 12.0 13.1   HEMATOCRIT 38.9 41.9   MCV 85.3 85.7   MCH 26.3* 26.8*   MCHC 30.8* 31.3*   RDW 49.6 49.9   PLATELETCT 261 210   MPV 11.0 10.8     Recent Labs     10/23/23  1254 10/24/23  0520   SODIUM 138 137   POTASSIUM 3.3* 3.5*   CHLORIDE 104 101   CO2 23 25   GLUCOSE 95 92   BUN 8 6*           Recent Labs     10/23/23  1254   ASTSGOT 23   ALTSGPT 14   TBILIRUBIN <0.2   ALKPHOSPHAT 81   GLOBULIN 3.9*         Imaging:  CT-ABDOMEN-PELVIS WITH  Narrative: 10/23/2023 4:09 PM    HISTORY/REASON FOR EXAM:  Pain; With Oral and IV contrast..  Abdominal pain. History of Crohn's disease.    TECHNIQUE/EXAM DESCRIPTION:   CT scan of the abdomen and pelvis with contrast.    Contrast-enhanced helical scanning was obtained from the diaphragmatic domes through the pubic symphysis following the bolus administration of nonionic contrast without complication.    100 mL of Omnipaque 350 nonionic contrast was administered without complication.    Low dose optimization technique was utilized for this CT exam including automated exposure control and adjustment of the mA and/or kV according to patient size.    COMPARISON: CT of the abdomen and pelvis 10/20/2022    FINDINGS:    Lung bases are clear. There are no effusions.    The liver is unremarkable. There is normal vascular enhancement. The extrahepatic portal vein, SMV, and splenic vein are patent.    There is no dilatation of intrahepatic biliary ducts.    The gallbladder is partly contracted but otherwise  unremarkable.    The spleen is normal.    The pancreas is normal.    The adrenal glands are unremarkable.    Kidneys show uniform nephrograms. There is no hydronephrosis or perinephric collection.    There are no renal or ureteral calcifications.    There is no retroperitoneal mass or para-aortic adenopathy.    The abdominal bowel loops are partly opacified with enteric contrast.    There is no abnormal bowel dilatation or evidence of acute obstruction. There is no free air or free fluid in the abdomen. No abdominal abscess is evident.    In the pelvis, the urinary bladder is unremarkable. The uterus is unremarkable. There are no adnexal mass lesions.    There is a small amount of free fluid in the cul-de-sac (axial images 75-77, series 2). Similar but lesser amount of free fluid was present on the prior study from 10/20/2022.    Bowel wall thickness appears borderline prominent along the descending colon and sigmoid colon, however, the bowel was not distended and this may be spurious. There is no free air.    There is no pelvic or inguinal adenopathy.    There is a minimal fat-containing umbilical hernia (axial image 50, series 2). No change from prior exam.    The bony structures are unremarkable.  Impression: 1.  Borderline bowel wall thickening suggested along the descending colon and sigmoid colon which may be overestimated as the bowel is not well distended. Index of suspicion is augmented by history of inflammatory bowel disease.  2.  Small amount of free fluid in the cul-de-sac. Nonspecific. Possibly physiologic related to ovulation. Similar but lesser finding on prior exam.            Impressions:  ## Acute viral versus bacterial gastroenteritis-patient denies any change to medications, job, stress, or exposure to illness.  She presents with several day history of viral type illness with nausea, vomiting, diarrhea.  Need to rule out infectious etiology, adrenal insufficiency, and celiac disease.  IVORY  difficile negative.  -Stool cultures, a.m. cortisol level, and celiac panel pending    ##Crohn's disease-patient has a 12-year history of Crohn's disease with associated joint pain.  She has not had a flare for over a year.  She has mildly elevated CRP and ESR.  CT scan with borderline bowel wall thickening.  She has not had colonoscopy in almost 5 years.  She has been compliant with her weekly Humira and [facility], reports there has been no discussion about changing her medications.    -Fecal calprotectin  -Upper endoscopy and colonoscopy when patient can tolerate prep  -Consider Humira levels to determine if she has become treatment resistant after colonoscopy    GI team will continue to follow.    Plan discussed with patient, RN, Dr. Marquez, and Dr. De La O    This note was generated using voice recognition software which has a small chance of producing errors of grammar and possibly content. I have made every reasonable attempt to find and correct any obvious errors, but expect that some may not be found prior to finalization of this note.

## 2023-10-24 NOTE — PROGRESS NOTES
[facility] Medicine Daily Progress Note    Date of Service  10/24/2023    Chief Complaint  Mi Bay is a 43 y.o. female admitted 10/23/2023 with n/v/d.    Hospital Course  Mi Bay is a 43 y.o. female with past medical history of Crohn's disease on Humira, hepatitis C, who presented 10/23/2023 with 4 days history of worsening abdominal pain associated with nausea, vomiting, watery diarrhea.  Denies fever, melena, chest pain, shortness of breath.  Reports she receives Humira every week.  She was last hospitalized for Crohn's flareup on 11/8/2022.     On arrival to ED, remained hypertensive.   Labs reviewed and noted with normal white count, hypokalemia potassium 3.3, slightly elevated CRP.  CT abdomen pelvis with contrast pending.        Gastroenterology  consulted recommended to get CT abdomen pelvis, ESR CRP.  Plan is to start history depending on CT finding     I spoke and discussed the case with the ER physician, Dr. Barry .     Patient will be admitted to the hospital for further evaluation and management for persistent nausea vomiting associated with watery diarrhea.  Need further work-up to rule out Crohn's flareup.  CT abdomen pelvis and C- dif PCR ordered.Need close monitoring of blood counts, electrolytes and renal function     Interval Problem Update  No acute events overnight.  Patient states symptoms are improving. She reports loose stool this morning, no blood seen.  C diff negative.  Continue IV fluids, supportive care.  GI following, appreciate their recommendations.  K low 3.5, attempted oral replacement but had vomiting.  Follow up BMP in AM, will order IV replacement as needed.  HTN prn's for elevated blood pressure.    I have discussed this patient's plan of care and discharge plan at IDT rounds today with Case Management, Nursing, Nursing leadership, and other members of the IDT team.    Consultants/Specialty  GI    Code Status  Full Code    Disposition  Medically  Cleared  I have placed the appropriate orders for post-discharge needs.    Review of Systems  Review of Systems   Constitutional:  Negative for chills and fever.   Eyes:  Negative for blurred vision and pain.   Respiratory:  Negative for cough and shortness of breath.    Cardiovascular:  Negative for chest pain, palpitations and leg swelling.   Gastrointestinal:  Positive for abdominal pain, diarrhea, nausea and vomiting. Negative for blood in stool, constipation and melena.   Genitourinary:  Negative for dysuria and urgency.   Musculoskeletal:  Negative for back pain.   Skin:  Negative for itching and rash.   Neurological:  Negative for dizziness, sensory change, focal weakness and headaches.   Psychiatric/Behavioral:  Negative for substance abuse. The patient does not have insomnia.         Physical Exam  Temp:  [36.1 °C (96.9 °F)-36.9 °C (98.4 °F)] 36.2 °C (97.2 °F)  Pulse:  [56-85] 76  Resp:  [16-18] 18  BP: (122-191)/() 162/102  SpO2:  [96 %-100 %] 98 %    Physical Exam  Vitals reviewed.   Constitutional:       General: She is not in acute distress.     Appearance: She is not diaphoretic.   HENT:      Head: Normocephalic and atraumatic.      Right Ear: External ear normal.      Left Ear: External ear normal.      Nose: Nose normal. No congestion.      Mouth/Throat:      Pharynx: No oropharyngeal exudate or posterior oropharyngeal erythema.   Eyes:      Extraocular Movements: Extraocular movements intact.      Pupils: Pupils are equal, round, and reactive to light.   Cardiovascular:      Rate and Rhythm: Normal rate and regular rhythm.   Pulmonary:      Effort: Pulmonary effort is normal. No respiratory distress.      Breath sounds: Normal breath sounds. No wheezing.   Abdominal:      General: Bowel sounds are normal. There is no distension.      Palpations: Abdomen is soft.      Tenderness: There is abdominal tenderness. There is no guarding or rebound.   Musculoskeletal:         General: No swelling.  Normal range of motion.      Cervical back: Normal range of motion and neck supple.      Right lower leg: No edema.      Left lower leg: No edema.   Skin:     General: Skin is warm and dry.   Neurological:      General: No focal deficit present.      Mental Status: She is alert and oriented to person, place, and time.      Cranial Nerves: No cranial nerve deficit.      Motor: No weakness.   Psychiatric:         Mood and Affect: Mood normal.         Behavior: Behavior normal.         Fluids    Intake/Output Summary (Last 24 hours) at 10/24/2023 1607  Last data filed at 10/24/2023 0600  Gross per 24 hour   Intake 1000 ml   Output 200 ml   Net 800 ml       Laboratory  Recent Labs     10/23/23  1254 10/24/23  0520   WBC 9.2 7.9   RBC 4.56 4.89   HEMOGLOBIN 12.0 13.1   HEMATOCRIT 38.9 41.9   MCV 85.3 85.7   MCH 26.3* 26.8*   MCHC 30.8* 31.3*   RDW 49.6 49.9   PLATELETCT 261 210   MPV 11.0 10.8     Recent Labs     10/23/23  1254 10/24/23  0520   SODIUM 138 137   POTASSIUM 3.3* 3.5*   CHLORIDE 104 101   CO2 23 25   GLUCOSE 95 92   BUN 8 6*   CREATININE 0.63 0.45*   CALCIUM 9.3 9.1                   Imaging  CT-ABDOMEN-PELVIS WITH   Final Result      1.  Borderline bowel wall thickening suggested along the descending colon and sigmoid colon which may be overestimated as the bowel is not well distended. Index of suspicion is augmented by history of inflammatory bowel disease.   2.  Small amount of free fluid in the cul-de-sac. Nonspecific. Possibly physiologic related to ovulation. Similar but lesser finding on prior exam.           Assessment/Plan  * Crohn's disease (HCC)- (present on admission)  Assessment & Plan  Crohn's disease on Humira  Concern for chronic flare up   Continue IV fluid   Hold steroid pending CT abdomen  Requiring IV narcotics for pain management.  Monitor for respiratory toxicity while on IV narcotics  GI been consulted      Dehydration  Assessment & Plan  Continue on IV fluid    Intractable nausea and  vomiting  Assessment & Plan  On IV fluid  Requiring IV antiemetic    Diarrhea  Assessment & Plan  Concern for Crohn's flareup  CRP elevated  CT abdomen pelvis reviewed  Continue supportive care, GI consulted         VTE prophylaxis:   SCDs/TEDs

## 2023-10-24 NOTE — PROGRESS NOTES
Received pt from ED via dung, pt ambulatory with steady gain; Aox4; not in acute distress for c/o abdominal pain/nausea/vomiting and diarrhea; pt denies pain nor diarrhea since admission; plan of care reviewed; needs attended; IVF's infusing well via LAC, site patent; pt will be npo after midnight; assessment done. Call light within easy reach. Encouarged to call for assistance; pt verbalizes understanding.

## 2023-10-25 LAB
ANION GAP SERPL CALC-SCNC: 12 MMOL/L (ref 7–16)
BUN SERPL-MCNC: 6 MG/DL (ref 8–22)
CALCIUM SERPL-MCNC: 9.2 MG/DL (ref 8.5–10.5)
CHLORIDE SERPL-SCNC: 102 MMOL/L (ref 96–112)
CO2 SERPL-SCNC: 25 MMOL/L (ref 20–33)
CORTIS SERPL-MCNC: 24.2 UG/DL (ref 0–23)
CREAT SERPL-MCNC: 0.66 MG/DL (ref 0.5–1.4)
ERYTHROCYTE [DISTWIDTH] IN BLOOD BY AUTOMATED COUNT: 48.3 FL (ref 35.9–50)
GFR SERPLBLD CREATININE-BSD FMLA CKD-EPI: 111 ML/MIN/1.73 M 2
GLUCOSE SERPL-MCNC: 90 MG/DL (ref 65–99)
HCT VFR BLD AUTO: 39 % (ref 37–47)
HGB BLD-MCNC: 12.6 G/DL (ref 12–16)
MCH RBC QN AUTO: 27.1 PG (ref 27–33)
MCHC RBC AUTO-ENTMCNC: 32.3 G/DL (ref 32.2–35.5)
MCV RBC AUTO: 83.9 FL (ref 81.4–97.8)
PLATELET # BLD AUTO: 290 K/UL (ref 164–446)
PMV BLD AUTO: 11.1 FL (ref 9–12.9)
POTASSIUM SERPL-SCNC: 3.2 MMOL/L (ref 3.6–5.5)
RBC # BLD AUTO: 4.65 M/UL (ref 4.2–5.4)
SODIUM SERPL-SCNC: 139 MMOL/L (ref 135–145)
WBC # BLD AUTO: 9.1 K/UL (ref 4.8–10.8)

## 2023-10-25 PROCEDURE — 700101 HCHG RX REV CODE 250: Performed by: NURSE PRACTITIONER

## 2023-10-25 PROCEDURE — 96366 THER/PROPH/DIAG IV INF ADDON: CPT

## 2023-10-25 PROCEDURE — 80048 BASIC METABOLIC PNL TOTAL CA: CPT

## 2023-10-25 PROCEDURE — A9270 NON-COVERED ITEM OR SERVICE: HCPCS | Performed by: STUDENT IN AN ORGANIZED HEALTH CARE EDUCATION/TRAINING PROGRAM

## 2023-10-25 PROCEDURE — 36415 COLL VENOUS BLD VENIPUNCTURE: CPT

## 2023-10-25 PROCEDURE — G0378 HOSPITAL OBSERVATION PER HR: HCPCS

## 2023-10-25 PROCEDURE — 96365 THER/PROPH/DIAG IV INF INIT: CPT

## 2023-10-25 PROCEDURE — 82533 TOTAL CORTISOL: CPT

## 2023-10-25 PROCEDURE — 700105 HCHG RX REV CODE 258: Performed by: STUDENT IN AN ORGANIZED HEALTH CARE EDUCATION/TRAINING PROGRAM

## 2023-10-25 PROCEDURE — 85027 COMPLETE CBC AUTOMATED: CPT

## 2023-10-25 PROCEDURE — 99232 SBSQ HOSP IP/OBS MODERATE 35: CPT | Performed by: NURSE PRACTITIONER

## 2023-10-25 PROCEDURE — 99232 SBSQ HOSP IP/OBS MODERATE 35: CPT | Performed by: STUDENT IN AN ORGANIZED HEALTH CARE EDUCATION/TRAINING PROGRAM

## 2023-10-25 PROCEDURE — 700102 HCHG RX REV CODE 250 W/ 637 OVERRIDE(OP): Performed by: STUDENT IN AN ORGANIZED HEALTH CARE EDUCATION/TRAINING PROGRAM

## 2023-10-25 PROCEDURE — 700111 HCHG RX REV CODE 636 W/ 250 OVERRIDE (IP): Performed by: STUDENT IN AN ORGANIZED HEALTH CARE EDUCATION/TRAINING PROGRAM

## 2023-10-25 RX ORDER — SODIUM CHLORIDE 9 MG/ML
INJECTION, SOLUTION INTRAVENOUS CONTINUOUS
Status: DISCONTINUED | OUTPATIENT
Start: 2023-10-25 | End: 2023-10-27 | Stop reason: HOSPADM

## 2023-10-25 RX ORDER — POTASSIUM CHLORIDE 7.45 MG/ML
10 INJECTION INTRAVENOUS
Status: COMPLETED | OUTPATIENT
Start: 2023-10-25 | End: 2023-10-25

## 2023-10-25 RX ADMIN — SODIUM CHLORIDE: 9 INJECTION, SOLUTION INTRAVENOUS at 09:28

## 2023-10-25 RX ADMIN — AMLODIPINE BESYLATE 10 MG: 10 TABLET ORAL at 05:10

## 2023-10-25 RX ADMIN — POTASSIUM CHLORIDE 10 MEQ: 7.46 INJECTION, SOLUTION INTRAVENOUS at 13:10

## 2023-10-25 RX ADMIN — POTASSIUM CHLORIDE 10 MEQ: 7.46 INJECTION, SOLUTION INTRAVENOUS at 12:10

## 2023-10-25 RX ADMIN — POLYETHYLENE GLYCOL-3350 AND ELECTROLYTES 4 L: 236; 6.74; 5.86; 2.97; 22.74 POWDER, FOR SOLUTION ORAL at 16:48

## 2023-10-25 RX ADMIN — POTASSIUM CHLORIDE 10 MEQ: 7.46 INJECTION, SOLUTION INTRAVENOUS at 14:12

## 2023-10-25 RX ADMIN — POTASSIUM CHLORIDE 10 MEQ: 7.46 INJECTION, SOLUTION INTRAVENOUS at 15:20

## 2023-10-25 ASSESSMENT — ENCOUNTER SYMPTOMS
EYE PAIN: 0
DIARRHEA: 1
CHILLS: 0
DEPRESSION: 0
BLOOD IN STOOL: 0
HEADACHES: 0
CONSTIPATION: 0
NAUSEA: 1
WEAKNESS: 0
FOCAL WEAKNESS: 0
FEVER: 0
DIZZINESS: 0
BACK PAIN: 0
ABDOMINAL PAIN: 1
VOMITING: 0
NAUSEA: 0
SENSORY CHANGE: 0
COUGH: 0
HEARTBURN: 0
SHORTNESS OF BREATH: 0
ABDOMINAL PAIN: 0
VOMITING: 1
INSOMNIA: 0
PALPITATIONS: 0
DIARRHEA: 0
BLURRED VISION: 0

## 2023-10-25 ASSESSMENT — PAIN DESCRIPTION - PAIN TYPE
TYPE: ACUTE PAIN
TYPE: ACUTE PAIN

## 2023-10-25 ASSESSMENT — LIFESTYLE VARIABLES: SUBSTANCE_ABUSE: 0

## 2023-10-25 NOTE — PROGRESS NOTES
..Gastroenterology Progress Note               Author:  BONNIE Peña Date & Time Created: 10/25/2023 10:54 AM       Patient ID:  Name:             Mi Bay  YOB: 1980  Age:                 43 y.o.  female  MRN:               [id number]        Medical Decision Making, by Problem:  Active Hospital Problems    Diagnosis     Nausea vomiting and diarrhea [R11.2, R19.7]     Diarrhea [R19.7]     Intractable nausea and vomiting [R11.2]     Dehydration [E86.0]     Crohn's disease (HCC) [K50.90]            Presenting Chief Complaint:  Crohn's disease flare      Interval History:  10/25/2023: Patient seen and examined. Feels significantly better with improvement in nausea and vomiting, still no BM. Potassium low. AM cortisol level elevated.     Hospital Medications:  Current Facility-Administered Medications   Medication Dose Frequency Provider Last Rate Last Admin    potassium chloride (Kcl) ivpb 10 mEq  10 mEq Q HOUR Marv Marquez M.D.        NS infusion   Continuous Marv Marquez M.D. 125 mL/hr at 10/25/23 0928 New Bag at 10/25/23 0928    ondansetron (Zofran) syringe/vial injection 4 mg  4 mg Q4HRS PRN BONNIE Velasquez   4 mg at 10/24/23 1232    labetalol (Normodyne/Trandate) injection 10 mg  10 mg Q6HRS PRN Marv Marquez M.D.        prochlorperazine (Compazine) injection 10 mg  10 mg Q6HRS PRN Marv Marquez M.D.   10 mg at 10/24/23 1543    senna-docusate (Pericolace Or Senokot S) 8.6-50 MG per tablet 2 Tablet  2 Tablet BID Ken Chase M.D.        And    polyethylene glycol/lytes (Miralax) PACKET 1 Packet  1 Packet QDAY PRN Ken Chase M.D.        And    magnesium hydroxide (Milk Of Magnesia) suspension 30 mL  30 mL QDAY PRN Ken Chase M.D.        And    bisacodyl (Dulcolax) suppository 10 mg  10 mg QDAY PRN Ken Chase M.D.        enoxaparin (Lovenox) inj 40 mg  40 mg DAILY AT 1800 Ken Chase M.D.   40 mg at 10/23/23 1932    acetaminophen (Tylenol) tablet 650 mg   650 mg Q6HRS PRN Ken Chase M.D.        Pharmacy Consult Request ...Pain Management Review 1 Each  1 Each PHARMACY TO DOSE Ken Chase M.D.        oxyCODONE immediate-release (Roxicodone) tablet 2.5 mg  2.5 mg Q3HRS PRN Ken Chase M.D.        Or    oxyCODONE immediate-release (Roxicodone) tablet 5 mg  5 mg Q3HRS PRN Ken Chase M.D.   5 mg at 10/24/23 1514    Or    HYDROmorphone (Dilaudid) injection 0.25 mg  0.25 mg Q3HRS PRN Ken Chase M.D.   0.25 mg at 10/24/23 0916    amLODIPine (Norvasc) tablet 10 mg  10 mg Q DAY Ken Chase M.D.   10 mg at 10/25/23 0510    hydrALAZINE (Apresoline) injection 20 mg  20 mg Q4HRS PRN LEONOR Velasquez.P.R.AN       Last reviewed on 10/23/2023  3:51 PM by Ariella Dowd       Review of Systems:  Review of Systems   Constitutional:  Positive for malaise/fatigue. Negative for chills and fever.   HENT:  Negative for hearing loss.    Eyes:  Negative for blurred vision.   Respiratory:  Negative for cough and shortness of breath.    Cardiovascular:  Negative for chest pain and leg swelling.   Gastrointestinal:  Negative for abdominal pain, blood in stool, constipation, diarrhea, heartburn, melena, nausea and vomiting.   Genitourinary:  Negative for dysuria.   Musculoskeletal:  Negative for back pain.   Skin:  Negative for rash.   Neurological:  Negative for dizziness and weakness.   Psychiatric/Behavioral:  Negative for depression.    All other systems reviewed and are negative.        Vital signs:  Weight/BMI: Body mass index is 26.48 kg/m².  BP (!) 148/95   Pulse 76   Temp 36.2 °C (97.2 °F) (Temporal)   Resp 16   Ht 1.524 m (5')   Wt 61.5 kg (135 lb 9.3 oz)   SpO2 95%   Vitals:    10/24/23 1929 10/25/23 0448 10/25/23 0756 10/25/23 0900   BP: (!) 163/102 (!) 160/90 (!) 148/95    Pulse: 98 (!) 105 76    Resp: 18 16 16    Temp: 37.1 °C (98.7 °F) 36.9 °C (98.5 °F) 36.2 °C (97.2 °F)    TempSrc: Oral Temporal Temporal    SpO2: 94% 96% 95%    Weight:       Height:     1.524 m (5')     Oxygen Therapy:  Pulse Oximetry: 95 %, O2 (LPM): 0, O2 Delivery Device: None - Room Air  No intake or output data in the 24 hours ending 10/25/23 1054      Physical Exam:  Physical Exam  Vitals and nursing note reviewed.   Constitutional:       General: She is not in acute distress.     Appearance: Normal appearance.   HENT:      Head: Normocephalic and atraumatic.      Right Ear: External ear normal.      Left Ear: External ear normal.      Nose: Nose normal.      Mouth/Throat:      Mouth: Mucous membranes are moist.      Pharynx: Oropharynx is clear.   Eyes:      General: No scleral icterus.  Cardiovascular:      Rate and Rhythm: Normal rate and regular rhythm.      Pulses: Normal pulses.      Heart sounds: Normal heart sounds.   Pulmonary:      Effort: Pulmonary effort is normal. No respiratory distress.      Breath sounds: Normal breath sounds.   Abdominal:      General: Abdomen is flat. Bowel sounds are normal. There is no distension.      Palpations: Abdomen is soft.      Tenderness: There is abdominal tenderness.   Musculoskeletal:         General: Normal range of motion.      Cervical back: Normal range of motion.   Skin:     General: Skin is warm and dry.      Capillary Refill: Capillary refill takes less than 2 seconds.   Neurological:      Mental Status: She is alert and oriented to person, place, and time.   Psychiatric:         Mood and Affect: Mood normal.         Behavior: Behavior normal.             Labs:  Recent Labs     10/23/23  1254 10/24/23  0520 10/25/23  0547   SODIUM 138 137 139   POTASSIUM 3.3* 3.5* 3.2*   CHLORIDE 104 101 102   CO2 23 25 25   BUN 8 6* 6*   CREATININE 0.63 0.45* 0.66   MAGNESIUM  --  2.1  --    CALCIUM 9.3 9.1 9.2     Recent Labs     10/23/23  1254 10/24/23  0520 10/25/23  0547   ALTSGPT 14  --   --    ASTSGOT 23  --   --    ALKPHOSPHAT 81  --   --    TBILIRUBIN <0.2  --   --    LIPASE 39  --   --    GLUCOSE 95 92 90     Recent Labs     10/23/23  1254  10/24/23  0520 10/25/23  0547   WBC 9.2 7.9 9.1   NEUTSPOLYS 65.00  --   --    LYMPHOCYTES 26.10  --   --    MONOCYTES 6.00  --   --    EOSINOPHILS 2.40  --   --    BASOPHILS 0.40  --   --    ASTSGOT 23  --   --    ALTSGPT 14  --   --    ALKPHOSPHAT 81  --   --    TBILIRUBIN <0.2  --   --      Recent Labs     10/23/23  1254 10/24/23  0520 10/25/23  0547   RBC 4.56 4.89 4.65   HEMOGLOBIN 12.0 13.1 12.6   HEMATOCRIT 38.9 41.9 39.0   PLATELETCT 261 210 290     Recent Results (from the past 24 hour(s))   CORTISOL    Collection Time: 10/25/23  5:47 AM   Result Value Ref Range    Cortisol 24.2 (H) 0.0 - 23.0 ug/dL   CBC WITHOUT DIFFERENTIAL    Collection Time: 10/25/23  5:47 AM   Result Value Ref Range    WBC 9.1 4.8 - 10.8 K/uL    RBC 4.65 4.20 - 5.40 M/uL    Hemoglobin 12.6 12.0 - 16.0 g/dL    Hematocrit 39.0 37.0 - 47.0 %    MCV 83.9 81.4 - 97.8 fL    MCH 27.1 27.0 - 33.0 pg    MCHC 32.3 32.2 - 35.5 g/dL    RDW 48.3 35.9 - 50.0 fL    Platelet Count 290 164 - 446 K/uL    MPV 11.1 9.0 - 12.9 fL   Basic Metabolic Panel    Collection Time: 10/25/23  5:47 AM   Result Value Ref Range    Sodium 139 135 - 145 mmol/L    Potassium 3.2 (L) 3.6 - 5.5 mmol/L    Chloride 102 96 - 112 mmol/L    Co2 25 20 - 33 mmol/L    Glucose 90 65 - 99 mg/dL    Bun 6 (L) 8 - 22 mg/dL    Creatinine 0.66 0.50 - 1.40 mg/dL    Calcium 9.2 8.5 - 10.5 mg/dL    Anion Gap 12.0 7.0 - 16.0   ESTIMATED GFR    Collection Time: 10/25/23  5:47 AM   Result Value Ref Range    GFR (CKD-EPI) 111 >60 mL/min/1.73 m 2       Radiology Review:  CT-ABDOMEN-PELVIS WITH   Final Result      1.  Borderline bowel wall thickening suggested along the descending colon and sigmoid colon which may be overestimated as the bowel is not well distended. Index of suspicion is augmented by history of inflammatory bowel disease.   2.  Small amount of free fluid in the cul-de-sac. Nonspecific. Possibly physiologic related to ovulation. Similar but lesser finding on prior exam.             MDM (Data Review):   -Records reviewed and summarized in current documentation  -I personally reviewed and interpreted the laboratory results  -I personally reviewed the radiology images    Assessment/Recommendations:  Impressions:  ## Acute viral versus bacterial gastroenteritis-patient denies any change to medications, job, stress, or exposure to illness.  She presents with several day history of viral type illness with nausea, vomiting, diarrhea.  Need to rule out infectious etiology, adrenal insufficiency, and celiac disease.  C. difficile negative. AM cortisol slightly elevated.  -Stool cultures and celiac panel pending     ##Crohn's disease-patient has a 12-year history of Crohn's disease with associated joint pain.  She has not had a flare for over a year.  She has mildly elevated CRP and ESR.  CT scan with borderline bowel wall thickening.  She has not had colonoscopy in almost 5 years.  She has been compliant with her weekly Humira and [facility], reports there has been no discussion about changing her medications.    -Fecal calprotectin pending  -Upper endoscopy and colonoscopy tomorrow  -May have clear liquids today  -GoLytely at 4pm  -NPO at midnight  -Takes Humira on Tuesday, elevated am cortisol, consider Humira levels to determine if she has become treatment resistant after colonoscopy     GI team will continue to follow.     Plan discussed with patient, RN, Dr. Marquez and Dr. Dorsey    Core Quality Measures   Reviewed items::  Labs, Medications and Radiology reports reviewed

## 2023-10-25 NOTE — PROGRESS NOTES
[facility] Medicine Daily Progress Note    Date of Service  10/25/2023    Chief Complaint  Mi Bay is a 43 y.o. female admitted 10/23/2023 with n/v/d.    Hospital Course  Mi Bay is a 43 y.o. female with past medical history of Crohn's disease on Humira, hepatitis C, who presented 10/23/2023 with 4 days history of worsening abdominal pain associated with nausea, vomiting, watery diarrhea.  Denies fever, melena, chest pain, shortness of breath.  Reports she receives Humira every week.  She was last hospitalized for Crohn's flareup on 11/8/2022.     On arrival to ED, remained hypertensive.   Labs reviewed and noted with normal white count, hypokalemia potassium 3.3, slightly elevated CRP.  CT abdomen pelvis with contrast pending.        Gastroenterology  consulted recommended to get CT abdomen pelvis, ESR CRP.  Plan is to start history depending on CT finding     I spoke and discussed the case with the ER physician, Dr. Barry .     Patient will be admitted to the hospital for further evaluation and management for persistent nausea vomiting associated with watery diarrhea.  Need further work-up to rule out Crohn's flareup.  CT abdomen pelvis and C- dif PCR ordered.Need close monitoring of blood counts, electrolytes and renal function     Interval Problem Update  No acute events overnight.  Patient reports symptoms continue to improve.  GI consulted, plan for EGD/colonoscopy tomorrow.  Bowel prep to start tonight, NPO at midnight.  Continue IV fluids.  K low, repleting by IV. Monitor.      I have discussed this patient's plan of care and discharge plan at IDT rounds today with Case Management, Nursing, Nursing leadership, and other members of the IDT team.    Consultants/Specialty  GI    Code Status  Full Code    Disposition  The patient is not medically cleared for discharge to home or a post-acute facility.  Anticipate discharge to: home with close outpatient follow-up    I have placed the  appropriate orders for post-discharge needs.    Review of Systems  Review of Systems   Constitutional:  Negative for chills and fever.   Eyes:  Negative for blurred vision and pain.   Respiratory:  Negative for cough and shortness of breath.    Cardiovascular:  Negative for chest pain, palpitations and leg swelling.   Gastrointestinal:  Positive for abdominal pain, diarrhea, nausea and vomiting. Negative for blood in stool, constipation and melena.   Genitourinary:  Negative for dysuria and urgency.   Musculoskeletal:  Negative for back pain.   Skin:  Negative for itching and rash.   Neurological:  Negative for dizziness, sensory change, focal weakness and headaches.   Psychiatric/Behavioral:  Negative for substance abuse. The patient does not have insomnia.         Physical Exam  Temp:  [36.2 °C (97.2 °F)-37.1 °C (98.7 °F)] 36.4 °C (97.5 °F)  Pulse:  [] 89  Resp:  [16-18] 16  BP: (148-163)/() 162/98  SpO2:  [91 %-96 %] 91 %    Physical Exam  Vitals reviewed.   Constitutional:       General: She is not in acute distress.     Appearance: She is not diaphoretic.   HENT:      Head: Normocephalic and atraumatic.      Right Ear: External ear normal.      Left Ear: External ear normal.      Nose: Nose normal. No congestion.      Mouth/Throat:      Pharynx: No oropharyngeal exudate or posterior oropharyngeal erythema.   Eyes:      Extraocular Movements: Extraocular movements intact.      Pupils: Pupils are equal, round, and reactive to light.   Cardiovascular:      Rate and Rhythm: Normal rate and regular rhythm.   Pulmonary:      Effort: Pulmonary effort is normal. No respiratory distress.      Breath sounds: Normal breath sounds. No wheezing.   Abdominal:      General: Bowel sounds are normal. There is no distension.      Palpations: Abdomen is soft.      Tenderness: There is abdominal tenderness. There is no guarding or rebound.   Musculoskeletal:         General: No swelling. Normal range of motion.       Cervical back: Normal range of motion and neck supple.      Right lower leg: No edema.      Left lower leg: No edema.   Skin:     General: Skin is warm and dry.   Neurological:      General: No focal deficit present.      Mental Status: She is alert and oriented to person, place, and time.      Cranial Nerves: No cranial nerve deficit.      Motor: No weakness.   Psychiatric:         Mood and Affect: Mood normal.         Behavior: Behavior normal.         Fluids    Intake/Output Summary (Last 24 hours) at 10/25/2023 1643  Last data filed at 10/25/2023 1548  Gross per 24 hour   Intake 120 ml   Output --   Net 120 ml       Laboratory  Recent Labs     10/23/23  1254 10/24/23  0520 10/25/23  0547   WBC 9.2 7.9 9.1   RBC 4.56 4.89 4.65   HEMOGLOBIN 12.0 13.1 12.6   HEMATOCRIT 38.9 41.9 39.0   MCV 85.3 85.7 83.9   MCH 26.3* 26.8* 27.1   MCHC 30.8* 31.3* 32.3   RDW 49.6 49.9 48.3   PLATELETCT 261 210 290   MPV 11.0 10.8 11.1     Recent Labs     10/23/23  1254 10/24/23  0520 10/25/23  0547   SODIUM 138 137 139   POTASSIUM 3.3* 3.5* 3.2*   CHLORIDE 104 101 102   CO2 23 25 25   GLUCOSE 95 92 90   BUN 8 6* 6*   CREATININE 0.63 0.45* 0.66   CALCIUM 9.3 9.1 9.2                   Imaging  CT-ABDOMEN-PELVIS WITH   Final Result      1.  Borderline bowel wall thickening suggested along the descending colon and sigmoid colon which may be overestimated as the bowel is not well distended. Index of suspicion is augmented by history of inflammatory bowel disease.   2.  Small amount of free fluid in the cul-de-sac. Nonspecific. Possibly physiologic related to ovulation. Similar but lesser finding on prior exam.           Assessment/Plan  * Crohn's disease (HCC)- (present on admission)  Assessment & Plan  Crohn's disease on Humira  Concern for chronic flare up   Continue IV fluid   Hold steroid pending CT abdomen  Requiring IV narcotics for pain management.  Monitor for respiratory toxicity while on IV narcotics  GI been consulted, plan for  EGD/colonoscopy tomorrow      Dehydration  Assessment & Plan  Continue on IV fluid    Intractable nausea and vomiting  Assessment & Plan  On IV fluid  Requiring IV antiemetic    Diarrhea  Assessment & Plan  Concern for Crohn's flareup  CRP elevated  CT abdomen pelvis reviewed  Continue supportive care, GI consulted         VTE prophylaxis:   SCDs/TEDs

## 2023-10-26 ENCOUNTER — ANESTHESIA EVENT (OUTPATIENT)
Dept: SURGERY | Facility: MEDICAL CENTER | Age: 43
DRG: 387 | End: 2023-10-26
Payer: COMMERCIAL

## 2023-10-26 ENCOUNTER — ANESTHESIA (OUTPATIENT)
Dept: SURGERY | Facility: MEDICAL CENTER | Age: 43
DRG: 387 | End: 2023-10-26
Payer: COMMERCIAL

## 2023-10-26 LAB
ANION GAP SERPL CALC-SCNC: 10 MMOL/L (ref 7–16)
BUN SERPL-MCNC: 5 MG/DL (ref 8–22)
CALCIUM SERPL-MCNC: 8.6 MG/DL (ref 8.5–10.5)
CHLORIDE SERPL-SCNC: 103 MMOL/L (ref 96–112)
CO2 SERPL-SCNC: 24 MMOL/L (ref 20–33)
CREAT SERPL-MCNC: 0.46 MG/DL (ref 0.5–1.4)
GFR SERPLBLD CREATININE-BSD FMLA CKD-EPI: 121 ML/MIN/1.73 M 2
GLUCOSE SERPL-MCNC: 83 MG/DL (ref 65–99)
IGA SERPL-MCNC: 847 MG/DL (ref 68–408)
PATHOLOGY CONSULT NOTE: NORMAL
POTASSIUM SERPL-SCNC: 2.9 MMOL/L (ref 3.6–5.5)
POTASSIUM SERPL-SCNC: 3.6 MMOL/L (ref 3.6–5.5)
SODIUM SERPL-SCNC: 137 MMOL/L (ref 135–145)

## 2023-10-26 PROCEDURE — A9270 NON-COVERED ITEM OR SERVICE: HCPCS

## 2023-10-26 PROCEDURE — 700111 HCHG RX REV CODE 636 W/ 250 OVERRIDE (IP): Performed by: NURSE PRACTITIONER

## 2023-10-26 PROCEDURE — 96366 THER/PROPH/DIAG IV INF ADDON: CPT

## 2023-10-26 PROCEDURE — 160035 HCHG PACU - 1ST 60 MINS PHASE I: Performed by: INTERNAL MEDICINE

## 2023-10-26 PROCEDURE — A9270 NON-COVERED ITEM OR SERVICE: HCPCS | Performed by: STUDENT IN AN ORGANIZED HEALTH CARE EDUCATION/TRAINING PROGRAM

## 2023-10-26 PROCEDURE — 700105 HCHG RX REV CODE 258: Performed by: STUDENT IN AN ORGANIZED HEALTH CARE EDUCATION/TRAINING PROGRAM

## 2023-10-26 PROCEDURE — 43239 EGD BIOPSY SINGLE/MULTIPLE: CPT | Performed by: INTERNAL MEDICINE

## 2023-10-26 PROCEDURE — 84132 ASSAY OF SERUM POTASSIUM: CPT

## 2023-10-26 PROCEDURE — 88312 SPECIAL STAINS GROUP 1: CPT

## 2023-10-26 PROCEDURE — 0DBN8ZX EXCISION OF SIGMOID COLON, VIA NATURAL OR ARTIFICIAL OPENING ENDOSCOPIC, DIAGNOSTIC: ICD-10-PCS | Performed by: INTERNAL MEDICINE

## 2023-10-26 PROCEDURE — 99232 SBSQ HOSP IP/OBS MODERATE 35: CPT | Performed by: STUDENT IN AN ORGANIZED HEALTH CARE EDUCATION/TRAINING PROGRAM

## 2023-10-26 PROCEDURE — 160203 HCHG ENDO MINUTES - 1ST 30 MINS LEVEL 4: Performed by: INTERNAL MEDICINE

## 2023-10-26 PROCEDURE — 160048 HCHG OR STATISTICAL LEVEL 1-5: Performed by: INTERNAL MEDICINE

## 2023-10-26 PROCEDURE — 700102 HCHG RX REV CODE 250 W/ 637 OVERRIDE(OP): Performed by: STUDENT IN AN ORGANIZED HEALTH CARE EDUCATION/TRAINING PROGRAM

## 2023-10-26 PROCEDURE — 700102 HCHG RX REV CODE 250 W/ 637 OVERRIDE(OP)

## 2023-10-26 PROCEDURE — 0DBP8ZX EXCISION OF RECTUM, VIA NATURAL OR ARTIFICIAL OPENING ENDOSCOPIC, DIAGNOSTIC: ICD-10-PCS | Performed by: INTERNAL MEDICINE

## 2023-10-26 PROCEDURE — 87046 STOOL CULTR AEROBIC BACT EA: CPT

## 2023-10-26 PROCEDURE — 0DBF8ZX EXCISION OF RIGHT LARGE INTESTINE, VIA NATURAL OR ARTIFICIAL OPENING ENDOSCOPIC, DIAGNOSTIC: ICD-10-PCS | Performed by: INTERNAL MEDICINE

## 2023-10-26 PROCEDURE — 87899 AGENT NOS ASSAY W/OPTIC: CPT

## 2023-10-26 PROCEDURE — 700101 HCHG RX REV CODE 250: Performed by: ANESTHESIOLOGY

## 2023-10-26 PROCEDURE — 160208 HCHG ENDO MINUTES - EA ADDL 1 MIN LEVEL 4: Performed by: INTERNAL MEDICINE

## 2023-10-26 PROCEDURE — 87045 FECES CULTURE AEROBIC BACT: CPT

## 2023-10-26 PROCEDURE — G0378 HOSPITAL OBSERVATION PER HR: HCPCS

## 2023-10-26 PROCEDURE — 700111 HCHG RX REV CODE 636 W/ 250 OVERRIDE (IP)

## 2023-10-26 PROCEDURE — 45380 COLONOSCOPY AND BIOPSY: CPT | Performed by: INTERNAL MEDICINE

## 2023-10-26 PROCEDURE — 88305 TISSUE EXAM BY PATHOLOGIST: CPT | Mod: 59

## 2023-10-26 PROCEDURE — 700111 HCHG RX REV CODE 636 W/ 250 OVERRIDE (IP): Mod: JZ | Performed by: ANESTHESIOLOGY

## 2023-10-26 PROCEDURE — 0DB68ZX EXCISION OF STOMACH, VIA NATURAL OR ARTIFICIAL OPENING ENDOSCOPIC, DIAGNOSTIC: ICD-10-PCS | Performed by: INTERNAL MEDICINE

## 2023-10-26 PROCEDURE — 160009 HCHG ANES TIME/MIN: Performed by: INTERNAL MEDICINE

## 2023-10-26 PROCEDURE — 0DBL8ZX EXCISION OF TRANSVERSE COLON, VIA NATURAL OR ARTIFICIAL OPENING ENDOSCOPIC, DIAGNOSTIC: ICD-10-PCS | Performed by: INTERNAL MEDICINE

## 2023-10-26 PROCEDURE — 0DBB8ZX EXCISION OF ILEUM, VIA NATURAL OR ARTIFICIAL OPENING ENDOSCOPIC, DIAGNOSTIC: ICD-10-PCS | Performed by: INTERNAL MEDICINE

## 2023-10-26 PROCEDURE — 160002 HCHG RECOVERY MINUTES (STAT): Performed by: INTERNAL MEDICINE

## 2023-10-26 PROCEDURE — 0DB98ZX EXCISION OF DUODENUM, VIA NATURAL OR ARTIFICIAL OPENING ENDOSCOPIC, DIAGNOSTIC: ICD-10-PCS | Performed by: INTERNAL MEDICINE

## 2023-10-26 PROCEDURE — 80048 BASIC METABOLIC PNL TOTAL CA: CPT

## 2023-10-26 RX ORDER — PREDNISONE 20 MG/1
40 TABLET ORAL DAILY
Status: DISCONTINUED | OUTPATIENT
Start: 2023-10-26 | End: 2023-10-27 | Stop reason: HOSPADM

## 2023-10-26 RX ORDER — PREDNISONE 20 MG/1
20 TABLET ORAL DAILY
Status: DISCONTINUED | OUTPATIENT
Start: 2023-11-15 | End: 2023-10-27 | Stop reason: HOSPADM

## 2023-10-26 RX ORDER — DIPHENHYDRAMINE HYDROCHLORIDE 50 MG/ML
12.5 INJECTION INTRAMUSCULAR; INTRAVENOUS
Status: DISCONTINUED | OUTPATIENT
Start: 2023-10-26 | End: 2023-10-26 | Stop reason: HOSPADM

## 2023-10-26 RX ORDER — HYDROMORPHONE HYDROCHLORIDE 1 MG/ML
0.2 INJECTION, SOLUTION INTRAMUSCULAR; INTRAVENOUS; SUBCUTANEOUS
Status: DISCONTINUED | OUTPATIENT
Start: 2023-10-26 | End: 2023-10-26 | Stop reason: HOSPADM

## 2023-10-26 RX ORDER — LIDOCAINE HYDROCHLORIDE 20 MG/ML
INJECTION, SOLUTION EPIDURAL; INFILTRATION; INTRACAUDAL; PERINEURAL PRN
Status: DISCONTINUED | OUTPATIENT
Start: 2023-10-26 | End: 2023-10-26 | Stop reason: SURG

## 2023-10-26 RX ORDER — CHOLECALCIFEROL (VITAMIN D3) 125 MCG
5 CAPSULE ORAL NIGHTLY PRN
Status: DISCONTINUED | OUTPATIENT
Start: 2023-10-26 | End: 2023-10-27 | Stop reason: HOSPADM

## 2023-10-26 RX ORDER — HYDROMORPHONE HYDROCHLORIDE 1 MG/ML
0.5 INJECTION, SOLUTION INTRAMUSCULAR; INTRAVENOUS; SUBCUTANEOUS
Status: DISCONTINUED | OUTPATIENT
Start: 2023-10-26 | End: 2023-10-26 | Stop reason: HOSPADM

## 2023-10-26 RX ORDER — ONDANSETRON 2 MG/ML
4 INJECTION INTRAMUSCULAR; INTRAVENOUS
Status: DISCONTINUED | OUTPATIENT
Start: 2023-10-26 | End: 2023-10-26 | Stop reason: HOSPADM

## 2023-10-26 RX ORDER — OXYCODONE HCL 5 MG/5 ML
5 SOLUTION, ORAL ORAL
Status: DISCONTINUED | OUTPATIENT
Start: 2023-10-26 | End: 2023-10-26 | Stop reason: HOSPADM

## 2023-10-26 RX ORDER — HYDROMORPHONE HYDROCHLORIDE 1 MG/ML
0.1 INJECTION, SOLUTION INTRAMUSCULAR; INTRAVENOUS; SUBCUTANEOUS
Status: DISCONTINUED | OUTPATIENT
Start: 2023-10-26 | End: 2023-10-26 | Stop reason: HOSPADM

## 2023-10-26 RX ORDER — POTASSIUM CHLORIDE 7.45 MG/ML
10 INJECTION INTRAVENOUS
Status: COMPLETED | OUTPATIENT
Start: 2023-10-26 | End: 2023-10-26

## 2023-10-26 RX ORDER — OXYCODONE HCL 5 MG/5 ML
10 SOLUTION, ORAL ORAL
Status: DISCONTINUED | OUTPATIENT
Start: 2023-10-26 | End: 2023-10-26 | Stop reason: HOSPADM

## 2023-10-26 RX ORDER — MIDAZOLAM HYDROCHLORIDE 1 MG/ML
1 INJECTION INTRAMUSCULAR; INTRAVENOUS
Status: DISCONTINUED | OUTPATIENT
Start: 2023-10-26 | End: 2023-10-26 | Stop reason: HOSPADM

## 2023-10-26 RX ORDER — SODIUM CHLORIDE, SODIUM LACTATE, POTASSIUM CHLORIDE, CALCIUM CHLORIDE 600; 310; 30; 20 MG/100ML; MG/100ML; MG/100ML; MG/100ML
INJECTION, SOLUTION INTRAVENOUS CONTINUOUS
Status: DISCONTINUED | OUTPATIENT
Start: 2023-10-26 | End: 2023-10-26 | Stop reason: HOSPADM

## 2023-10-26 RX ORDER — IPRATROPIUM BROMIDE AND ALBUTEROL SULFATE 2.5; .5 MG/3ML; MG/3ML
3 SOLUTION RESPIRATORY (INHALATION)
Status: DISCONTINUED | OUTPATIENT
Start: 2023-10-26 | End: 2023-10-26 | Stop reason: HOSPADM

## 2023-10-26 RX ORDER — PREDNISONE 10 MG/1
10 TABLET ORAL DAILY
Status: DISCONTINUED | OUTPATIENT
Start: 2023-11-25 | End: 2023-10-27 | Stop reason: HOSPADM

## 2023-10-26 RX ORDER — MEPERIDINE HYDROCHLORIDE 25 MG/ML
25 INJECTION INTRAMUSCULAR; INTRAVENOUS; SUBCUTANEOUS
Status: DISCONTINUED | OUTPATIENT
Start: 2023-10-26 | End: 2023-10-26 | Stop reason: HOSPADM

## 2023-10-26 RX ADMIN — SODIUM CHLORIDE: 9 INJECTION, SOLUTION INTRAVENOUS at 01:18

## 2023-10-26 RX ADMIN — LIDOCAINE HYDROCHLORIDE 20 MG: 20 INJECTION, SOLUTION EPIDURAL; INFILTRATION; INTRACAUDAL at 12:54

## 2023-10-26 RX ADMIN — POTASSIUM CHLORIDE 10 MEQ: 7.46 INJECTION, SOLUTION INTRAVENOUS at 10:35

## 2023-10-26 RX ADMIN — POTASSIUM CHLORIDE 10 MEQ: 7.46 INJECTION, SOLUTION INTRAVENOUS at 08:12

## 2023-10-26 RX ADMIN — PROPOFOL 100 MG: 10 INJECTION, EMULSION INTRAVENOUS at 13:05

## 2023-10-26 RX ADMIN — Medication 5 MG: at 22:34

## 2023-10-26 RX ADMIN — PROPOFOL 100 MG: 10 INJECTION, EMULSION INTRAVENOUS at 12:54

## 2023-10-26 RX ADMIN — FENTANYL CITRATE 100 MCG: 50 INJECTION, SOLUTION INTRAMUSCULAR; INTRAVENOUS at 12:54

## 2023-10-26 RX ADMIN — PREDNISONE 40 MG: 20 TABLET ORAL at 15:47

## 2023-10-26 RX ADMIN — POTASSIUM CHLORIDE 10 MEQ: 7.46 INJECTION, SOLUTION INTRAVENOUS at 07:14

## 2023-10-26 RX ADMIN — AMLODIPINE BESYLATE 10 MG: 10 TABLET ORAL at 04:46

## 2023-10-26 RX ADMIN — PROPOFOL 100 MG: 10 INJECTION, EMULSION INTRAVENOUS at 13:15

## 2023-10-26 RX ADMIN — SODIUM CHLORIDE: 9 INJECTION, SOLUTION INTRAVENOUS at 12:52

## 2023-10-26 RX ADMIN — MIDAZOLAM HYDROCHLORIDE 2 MG: 2 INJECTION, SOLUTION INTRAMUSCULAR; INTRAVENOUS at 12:52

## 2023-10-26 RX ADMIN — SODIUM CHLORIDE: 9 INJECTION, SOLUTION INTRAVENOUS at 22:35

## 2023-10-26 RX ADMIN — POTASSIUM CHLORIDE 10 MEQ: 7.46 INJECTION, SOLUTION INTRAVENOUS at 09:26

## 2023-10-26 ASSESSMENT — PAIN SCALES - GENERAL: PAIN_LEVEL: 2

## 2023-10-26 ASSESSMENT — ENCOUNTER SYMPTOMS
ABDOMINAL PAIN: 1
FOCAL WEAKNESS: 0
BLURRED VISION: 0
SENSORY CHANGE: 0
EYE PAIN: 0
COUGH: 0
CONSTIPATION: 0
INSOMNIA: 0
DIARRHEA: 1
CHILLS: 0
HEADACHES: 0
SHORTNESS OF BREATH: 0
PALPITATIONS: 0
VOMITING: 1
NAUSEA: 1
BLOOD IN STOOL: 0
BACK PAIN: 0
DIZZINESS: 0
FEVER: 0

## 2023-10-26 ASSESSMENT — PAIN DESCRIPTION - PAIN TYPE
TYPE: ACUTE PAIN

## 2023-10-26 ASSESSMENT — LIFESTYLE VARIABLES: SUBSTANCE_ABUSE: 0

## 2023-10-26 NOTE — PROCEDURES
OPERATIVE REPORT    PATIENT:   Mi Bay   1980       PREOPERATIVE DIAGNOSES/INDICATIONS: N/V and poor oral intake.     POSTOPERATIVE DIAGNOSIS:   Mild inflammation at duodenum and stomach, biopsied.   Grossly normal esophagus.   No definite ulcer is seen.     PROCEDURE:  ESOPHAGOGASTRODUODENOSCOPY    PHYSICIAN:  Natalia Dorsey MD. MPH.    ANESTHESIA:  Per anesthesiologist or ICU team.     LOCATION: [facility]    CONSENT:  OBTAINED. The risks, benefits and alternatives of the procedure were discussed in details. The risks include and are not limited to bleeding, infection, perforation, missed lesions, and sedations risks (cardiopulmonary compromise and allergic reaction to medications).    DESCRIPTION: The patient presented to the procedure room.  After routine checkup was performed, patient was brought into the endoscopy suite.  Patient was placed on his left lateral decubitus position. A bite block was placed in patient's mouth. Patient was sedated by anesthesia.  Vital signs were monitored throughout the procedure.  Oxygenation support was provided throughout the procedure. Upper endoscope was inserted into patient's mouth and advanced to the second portion of the duodenum under direct visualization.      Once the site was reached and examined, the upper endoscope was withdrawn.  Retroflexion was made within the stomach.  The stomach was decompressed, scope was withdrawn and the procedure was terminated.    The patient tolerated the procedure well.  There were no immediate complications.    OPERATIVE FINDINGS:  Mild inflammation at duodenum and stomach, biopsied.   Grossly normal esophagus.   No definite ulcer is seen.     RECOMMENDATIONS:  Proceed to lower GI scope.   No overt erosion or ulcer.     This note has been transcribed with digital voice recognition software and although it has been reviewed may contain grammatical or word errors

## 2023-10-26 NOTE — ANESTHESIA TIME REPORT
Anesthesia Start and Stop Event Times     Date Time Event    10/26/2023 1224 Ready for Procedure     1252 Anesthesia Start     1337 Anesthesia Stop        Responsible Staff  10/26/23    Name Role Begin End    Prashanth Jack M.D. Anesth 1252 1337        Overtime Reason:  no overtime (within assigned shift)    Comments:

## 2023-10-26 NOTE — ANESTHESIA PREPROCEDURE EVALUATION
Case: [id number] Date/Time: 10/26/23 1020    Procedures:       COLONOSCOPY (Anus)      GASTROSCOPY (Esophagus)    Anesthesia type: MAC    Pre-op diagnosis: Crohn's disease flare    Location: [facility] ROOM 26 / SURGERY SAME DAY [facility]    Surgeons: Natalia Dorsey M.D.          Relevant Problems      (positive) Chronic hepatitis C (HCC)   (positive) Chronic hepatitis C without hepatic coma (HCC)       Physical Exam    Airway   Mallampati: II  TM distance: >3 FB  Neck ROM: full       Cardiovascular - normal exam  Rhythm: regular  Rate: normal  (-) murmur     Dental - normal exam           Pulmonary - normal exam  Breath sounds clear to auscultation     Abdominal    Neurological - normal exam                 Anesthesia Plan    ASA 3       Plan - general       Airway plan will be LMA          Induction: intravenous    Postoperative Plan: Postoperative administration of opioids is intended.    Pertinent diagnostic labs and testing reviewed    Informed Consent:    Anesthetic plan and risks discussed with patient.    Use of blood products discussed with: patient whom consented to blood products.

## 2023-10-26 NOTE — PROCEDURES
OPERATIVE REPORT        PATIENT: Mi Bay  1980      PREOPERATIVE DIAGNOSIS/INDICATION: r/o IBD flare.     POSTOPERATIVE DIAGNOSES:   Pancolitis, with multiple ulcers, erosions with some oozing.   Multiple pseudopolyps. Haque 3.   Small bowel mucosa appears normal.   Biopsy is done at terminal ileum, random right colon, Transverse, left and sigmoid/rectal, total 5 jars for this procedure.     PROCEDURE: COLONOSCOPY    PHYSICIAN: Dexter Dorsey MD MPH    CONSENT:  OBTAINED. The risks, benefits and alternatives of the procedure were discussed in details. The risks include and are not limited to bleeding, infection, perforation, missed lesions, and sedations risks (cardiopulmonary compromise and allergic reaction to medications).    ANESTHESIA:  Per anesthesiologist.    LOCATION: [facility]    DESCRIPTION:  The patient presented to the procedure room.  After routine checkup was performed, patient was brought into endoscopy suite.  Patient was placed on his left lateral decubitus position.  Patient was sedated by anesthesia. Vital signs were monitored throughout procedure.  Oxygenation support was provided throughout procedure. Digital rectal examination was performed.  Then, a colonoscope was inserted into patient's anus, advanced to the terminal ileum under direct visualization.  Once the site was reached and examined, the colonoscope was withdrawn and procedure was terminated. Withdrawal time was at least 6 minutes to ensure adequate examination.    The patient tolerated the procedure well.  There were no immediate complications.    The quality of the bowel preparation was not adequate.      FINDINGS:  Pancolitis, with multiple ulcers, erosions with some oozing.   Multiple pseudopolyps. Haque 3.   Small bowel mucosa appears normal.   Biopsy is done at terminal ileum, random right colon, Transverse, left and sigmoid/rectal, total 5 jars for this procedure.     RECOMMENDATIONS:  Please refer to detailed recs in  GI rounding note.   Okay to resume diet.     Inpatient GI team will continue to follow up.        This note has been transcribed with digital voice recognition software and although it has been reviewed may contain grammatical or word errors

## 2023-10-26 NOTE — ANESTHESIA PROCEDURE NOTES
Airway    Date/Time: 10/26/2023 12:55 PM    Performed by: Prashanth Jack M.D.  Authorized by: Prashanth Jack M.D.    Location:  OR  Urgency:  Elective  Indications for Airway Management:  Anesthesia      Spontaneous Ventilation: absent    Sedation Level:  Deep  Preoxygenated: Yes    Final Airway Type:  Supraglottic airway  Final Supraglottic Airway:  Standard LMA    SGA Size:  3  Number of Attempts at Approach:  1

## 2023-10-26 NOTE — OR NURSING
1330 -from OR to PACU QR4. Connected to monitor. Report received from anesthesia & RN. VSS. 02 6 via mask, LMA in place. Pt attempting to sit up, reassurance provided. Per report, pt tachycardic during procedure into 110's. Pt currently ST at 121 with high HR of 146. Anesthesia aware.      1334-Pt now calm, resting on RA. Appears comfortable.    1345 MD at bedside     1356 report called to Jose MCKINNEY, placed on transport     1427 patient transported to room via gurney with tech, pt on room air.

## 2023-10-26 NOTE — PROGRESS NOTES
[facility] Medicine Daily Progress Note    Date of Service  10/26/2023    Chief Complaint  Mi Bay is a 43 y.o. female admitted 10/23/2023 with n/v/d.    Hospital Course  Mi Bay is a 43 y.o. female with past medical history of Crohn's disease on Humira, hepatitis C, who presented 10/23/2023 with 4 days history of worsening abdominal pain associated with nausea, vomiting, watery diarrhea.  Denies fever, melena, chest pain, shortness of breath.  Reports she receives Humira every week.  She was last hospitalized for Crohn's flareup on 11/8/2022.     On arrival to ED, remained hypertensive.   Labs reviewed and noted with normal white count, hypokalemia potassium 3.3, slightly elevated CRP.  CT abdomen pelvis with contrast pending.        Gastroenterology  consulted recommended to get CT abdomen pelvis, ESR CRP.  Plan is to start history depending on CT finding     I spoke and discussed the case with the ER physician, Dr. Barry .     Patient will be admitted to the hospital for further evaluation and management for persistent nausea vomiting associated with watery diarrhea.  Need further work-up to rule out Crohn's flareup.  CT abdomen pelvis and C- dif PCR ordered.Need close monitoring of blood counts, electrolytes and renal function     Interval Problem Update  No acute events overnight.  NPO, plan for EGD/colonoscopy today.  Appreciate GI recommendations.  Repleting K via IV.  Continue IV fluids.    I have discussed this patient's plan of care and discharge plan at IDT rounds today with Case Management, Nursing, Nursing leadership, and other members of the IDT team.    Consultants/Specialty  GI    Code Status  Full Code    Disposition  The patient is not medically cleared for discharge to home or a post-acute facility.  Anticipate discharge to: home with close outpatient follow-up    I have placed the appropriate orders for post-discharge needs.    Review of Systems  Review of Systems    Constitutional:  Negative for chills and fever.   Eyes:  Negative for blurred vision and pain.   Respiratory:  Negative for cough and shortness of breath.    Cardiovascular:  Negative for chest pain, palpitations and leg swelling.   Gastrointestinal:  Positive for abdominal pain, diarrhea, nausea and vomiting. Negative for blood in stool, constipation and melena.   Genitourinary:  Negative for dysuria and urgency.   Musculoskeletal:  Negative for back pain.   Skin:  Negative for itching and rash.   Neurological:  Negative for dizziness, sensory change, focal weakness and headaches.   Psychiatric/Behavioral:  Negative for substance abuse. The patient does not have insomnia.         Physical Exam  Temp:  [36.4 °C (97.5 °F)-36.7 °C (98 °F)] 36.6 °C (97.8 °F)  Pulse:  [62-89] 64  Resp:  [16-18] 18  BP: (142-162)/(77-98) 149/84  SpO2:  [91 %-98 %] 98 %    Physical Exam  Vitals reviewed.   Constitutional:       General: She is not in acute distress.     Appearance: She is not diaphoretic.   HENT:      Head: Normocephalic and atraumatic.      Right Ear: External ear normal.      Left Ear: External ear normal.      Nose: Nose normal. No congestion.      Mouth/Throat:      Pharynx: No oropharyngeal exudate or posterior oropharyngeal erythema.   Eyes:      Extraocular Movements: Extraocular movements intact.      Pupils: Pupils are equal, round, and reactive to light.   Cardiovascular:      Rate and Rhythm: Normal rate and regular rhythm.   Pulmonary:      Effort: Pulmonary effort is normal. No respiratory distress.      Breath sounds: Normal breath sounds. No wheezing.   Abdominal:      General: Bowel sounds are normal. There is no distension.      Palpations: Abdomen is soft.      Tenderness: There is abdominal tenderness. There is no guarding or rebound.   Musculoskeletal:         General: No swelling. Normal range of motion.      Cervical back: Normal range of motion and neck supple.      Right lower leg: No edema.       Left lower leg: No edema.   Skin:     General: Skin is warm and dry.   Neurological:      General: No focal deficit present.      Mental Status: She is alert and oriented to person, place, and time.      Cranial Nerves: No cranial nerve deficit.      Motor: No weakness.   Psychiatric:         Mood and Affect: Mood normal.         Behavior: Behavior normal.         Fluids    Intake/Output Summary (Last 24 hours) at 10/26/2023 1218  Last data filed at 10/25/2023 1548  Gross per 24 hour   Intake 120 ml   Output --   Net 120 ml       Laboratory  Recent Labs     10/23/23  1254 10/24/23  0520 10/25/23  0547   WBC 9.2 7.9 9.1   RBC 4.56 4.89 4.65   HEMOGLOBIN 12.0 13.1 12.6   HEMATOCRIT 38.9 41.9 39.0   MCV 85.3 85.7 83.9   MCH 26.3* 26.8* 27.1   MCHC 30.8* 31.3* 32.3   RDW 49.6 49.9 48.3   PLATELETCT 261 210 290   MPV 11.0 10.8 11.1     Recent Labs     10/24/23  0520 10/25/23  0547 10/26/23  0418   SODIUM 137 139 137   POTASSIUM 3.5* 3.2* 2.9*   CHLORIDE 101 102 103   CO2 25 25 24   GLUCOSE 92 90 83   BUN 6* 6* 5*   CREATININE 0.45* 0.66 0.46*   CALCIUM 9.1 9.2 8.6                   Imaging  CT-ABDOMEN-PELVIS WITH   Final Result      1.  Borderline bowel wall thickening suggested along the descending colon and sigmoid colon which may be overestimated as the bowel is not well distended. Index of suspicion is augmented by history of inflammatory bowel disease.   2.  Small amount of free fluid in the cul-de-sac. Nonspecific. Possibly physiologic related to ovulation. Similar but lesser finding on prior exam.           Assessment/Plan  * Crohn's disease (HCC)- (present on admission)  Assessment & Plan  Crohn's disease on Humira  Concern for chronic flare up   Continue IV fluid   Hold steroid pending CT abdomen  Requiring IV narcotics for pain management.  Monitor for respiratory toxicity while on IV narcotics  GI been consulted, plan for EGD/colonoscopy today      Dehydration  Assessment & Plan  Continue on IV  fluid    Intractable nausea and vomiting  Assessment & Plan  On IV fluid  Requiring IV antiemetic    Diarrhea  Assessment & Plan  Concern for Crohn's flareup  CRP elevated  CT abdomen pelvis reviewed  Continue supportive care, GI consulted         VTE prophylaxis:   SCDs/TEDs

## 2023-10-26 NOTE — ANESTHESIA POSTPROCEDURE EVALUATION
Patient: Mi Bay    Procedure Summary     Date: 10/26/23 Room / Location: [facility] ROOM 26 / SURGERY SAME DAY [facility]    Anesthesia Start: 1252 Anesthesia Stop: 1337    Procedures:       COLONOSCOPY (Anus)      GASTROSCOPY (Esophagus)      GASTROSCOPY, WITH BIOPSY (Esophagus)      COLONOSCOPY, WITH BIOPSY (Anus) Diagnosis: (duodenitits, gastritis, hiatal hernia, pancolitis)    Surgeons: Natalia Dorsey M.D. Responsible Provider: Prashanth Jack M.D.    Anesthesia Type: general ASA Status: 3          Final Anesthesia Type: general  Last vitals  BP   Blood Pressure: (!) 161/108    Temp   36.2 °C (97.2 °F)    Pulse   (!) 146   Resp   20    SpO2   99 %      Anesthesia Post Evaluation    Patient location during evaluation: PACU  Patient participation: complete - patient participated  Level of consciousness: awake and alert  Pain score: 2    Airway patency: patent  Anesthetic complications: no  Cardiovascular status: hemodynamically stable  Respiratory status: acceptable  Hydration status: euvolemic    PONV: none          No notable events documented.     Nurse Pain Score: 0 (NPRS)

## 2023-10-26 NOTE — PROGRESS NOTES
Pt alert/oriented x4, no report of pain or N/V. Tolerating IVF. NPO at midnight for procedure in AM. Call light within reach, personal belongings available, bed in lowest position, treaded socks on, and hourly rounding in place.

## 2023-10-27 ENCOUNTER — PHARMACY VISIT (OUTPATIENT)
Dept: PHARMACY | Facility: MEDICAL CENTER | Age: 43
End: 2023-10-27
Payer: COMMERCIAL

## 2023-10-27 VITALS
RESPIRATION RATE: 17 BRPM | HEART RATE: 122 BPM | DIASTOLIC BLOOD PRESSURE: 105 MMHG | BODY MASS INDEX: 26.62 KG/M2 | SYSTOLIC BLOOD PRESSURE: 164 MMHG | TEMPERATURE: 97.4 F | WEIGHT: 135.58 LBS | HEIGHT: 60 IN | OXYGEN SATURATION: 97 %

## 2023-10-27 PROBLEM — K50.119 CROHN'S COLITIS, UNSPECIFIED COMPLICATION (HCC): Status: ACTIVE | Noted: 2023-10-27

## 2023-10-27 LAB
ANION GAP SERPL CALC-SCNC: 13 MMOL/L (ref 7–16)
BUN SERPL-MCNC: 13 MG/DL (ref 8–22)
CALCIUM SERPL-MCNC: 10.3 MG/DL (ref 8.5–10.5)
CALPROTECTIN STL-MCNT: 887 UG/G
CHLORIDE SERPL-SCNC: 100 MMOL/L (ref 96–112)
CO2 SERPL-SCNC: 20 MMOL/L (ref 20–33)
CREAT SERPL-MCNC: 0.62 MG/DL (ref 0.5–1.4)
E COLI SXT1+2 STL IA: NORMAL
GFR SERPLBLD CREATININE-BSD FMLA CKD-EPI: 113 ML/MIN/1.73 M 2
GLUCOSE SERPL-MCNC: 140 MG/DL (ref 65–99)
POTASSIUM SERPL-SCNC: 3.7 MMOL/L (ref 3.6–5.5)
SIGNIFICANT IND 70042: NORMAL
SITE SITE: NORMAL
SODIUM SERPL-SCNC: 133 MMOL/L (ref 135–145)
SOURCE SOURCE: NORMAL
TTG IGA SER IA-ACNC: <2 U/ML (ref 0–3)

## 2023-10-27 PROCEDURE — 80048 BASIC METABOLIC PNL TOTAL CA: CPT

## 2023-10-27 PROCEDURE — RXMED WILLOW AMBULATORY MEDICATION CHARGE: Performed by: STUDENT IN AN ORGANIZED HEALTH CARE EDUCATION/TRAINING PROGRAM

## 2023-10-27 PROCEDURE — A9270 NON-COVERED ITEM OR SERVICE: HCPCS | Performed by: STUDENT IN AN ORGANIZED HEALTH CARE EDUCATION/TRAINING PROGRAM

## 2023-10-27 PROCEDURE — 99239 HOSP IP/OBS DSCHRG MGMT >30: CPT | Performed by: STUDENT IN AN ORGANIZED HEALTH CARE EDUCATION/TRAINING PROGRAM

## 2023-10-27 PROCEDURE — 99232 SBSQ HOSP IP/OBS MODERATE 35: CPT | Performed by: NURSE PRACTITIONER

## 2023-10-27 PROCEDURE — 700102 HCHG RX REV CODE 250 W/ 637 OVERRIDE(OP): Performed by: STUDENT IN AN ORGANIZED HEALTH CARE EDUCATION/TRAINING PROGRAM

## 2023-10-27 PROCEDURE — 770006 HCHG ROOM/CARE - MED/SURG/GYN SEMI*

## 2023-10-27 PROCEDURE — 700111 HCHG RX REV CODE 636 W/ 250 OVERRIDE (IP): Performed by: NURSE PRACTITIONER

## 2023-10-27 RX ORDER — AMLODIPINE BESYLATE 10 MG/1
10 TABLET ORAL DAILY
Qty: 30 TABLET | Refills: 0 | Status: SHIPPED | OUTPATIENT
Start: 2023-10-28 | End: 2023-11-29 | Stop reason: SDUPTHER

## 2023-10-27 RX ORDER — PREDNISONE 20 MG/1
TABLET ORAL
Qty: 50 TABLET | Refills: 0 | Status: SHIPPED | OUTPATIENT
Start: 2023-10-27 | End: 2023-11-29

## 2023-10-27 RX ORDER — LOSARTAN POTASSIUM 25 MG/1
25 TABLET ORAL
Status: DISCONTINUED | OUTPATIENT
Start: 2023-10-27 | End: 2023-10-27 | Stop reason: HOSPADM

## 2023-10-27 RX ADMIN — PREDNISONE 40 MG: 20 TABLET ORAL at 05:11

## 2023-10-27 RX ADMIN — LOSARTAN POTASSIUM 25 MG: 25 TABLET, FILM COATED ORAL at 09:11

## 2023-10-27 RX ADMIN — AMLODIPINE BESYLATE 10 MG: 10 TABLET ORAL at 05:11

## 2023-10-27 ASSESSMENT — ENCOUNTER SYMPTOMS
DIZZINESS: 0
BACK PAIN: 0
HEARTBURN: 0
DEPRESSION: 0
NAUSEA: 0
DIARRHEA: 0
CHILLS: 0
VOMITING: 0
CONSTIPATION: 0
FEVER: 0
BLURRED VISION: 0
BLOOD IN STOOL: 0
ABDOMINAL PAIN: 0
COUGH: 0
WEAKNESS: 0
SHORTNESS OF BREATH: 0

## 2023-10-27 NOTE — PROGRESS NOTES
Pt was brought down to discharge lounge. Pt's IV was removed prior. Pt is taking a Lyft home. Pt did not have any questions regarding discharge. Pt is just awaiting meds via meds to beds. Pt states having all belongings.

## 2023-10-27 NOTE — PROGRESS NOTES
..Gastroenterology Progress Note               Author:  BONNIE Peña Date & Time Created: 10/27/2023 8:15 AM       Patient ID:  Name:             Mi Bay  YOB: 1980  Age:                 43 y.o.  female  MRN:               [id number]        Medical Decision Making, by Problem:  Active Hospital Problems    Diagnosis     Crohn's colitis, unspecified complication (HCC) [K50.119]     Nausea vomiting and diarrhea [R11.2, R19.7]     Diarrhea [R19.7]     Intractable nausea and vomiting [R11.2]     Dehydration [E86.0]     Crohn's disease (HCC) [K50.90]            Presenting Chief Complaint:  Crohn's disease flare      Interval History:  10/27/2023: Patient seen. Feels significantly better, tolerating her diet. 3 loose non-bloody BM this am. Celiac negative. Path pending. Fecal calprotectin and stool culture remain pending.     10/26/2023: EGD and colonoscopy with Dr. Dorsey:  OPERATIVE FINDINGS:  Mild inflammation at duodenum and stomach, biopsied.   Grossly normal esophagus.   No definite ulcer is seen.     FINDINGS:  Pancolitis, with multiple ulcers, erosions with some oozing.   Multiple pseudopolyps. Haque 3.   Small bowel mucosa appears normal.   Biopsy is done at terminal ileum, random right colon, Transverse, left and sigmoid/rectal, total 5 jars for this procedure.     10/25/2023: Patient seen and examined. Feels significantly better with improvement in nausea and vomiting, still no BM. Potassium low. AM cortisol level elevated.     Hospital Medications:  Current Facility-Administered Medications   Medication Dose Frequency Provider Last Rate Last Admin    losartan (Cozaar) tablet 25 mg  25 mg Q DAY Marv Marquez M.D.        predniSONE (Deltasone) tablet 40 mg  40 mg DAILY SUSANA PeñaP.R.N.   40 mg at 10/27/23 0511    [START ON 11/5/2023] predniSONE (Deltasone) tablet 30 mg  30 mg DAILY SUSANA PeñaP.R.N.        Followed by    [START ON 11/15/2023]  predniSONE (Deltasone) tablet 20 mg  20 mg DAILY Anne Marie Ray A.P.R.NBerny        Followed by    [START ON 11/25/2023] predniSONE (Deltasone) tablet 10 mg  10 mg DAILY LEONOR Peña.P.R.N.        melatonin tablet 5 mg  5 mg HS PRN WIL EscobarPBerny   5 mg at 10/26/23 2234    NS infusion   Continuous Marv Marquez M.D. 125 mL/hr at 10/26/23 2235 New Bag at 10/26/23 2235    ondansetron (Zofran) syringe/vial injection 4 mg  4 mg Q4HRS PRN JUAN VelasquezRGWEN   4 mg at 10/24/23 1232    labetalol (Normodyne/Trandate) injection 10 mg  10 mg Q6HRS PRN Marv Marquez M.D.        prochlorperazine (Compazine) injection 10 mg  10 mg Q6HRS PRN Marv Marquez M.D.   10 mg at 10/24/23 1543    [Held by provider] enoxaparin (Lovenox) inj 40 mg  40 mg DAILY AT 1800 Ken Chase M.D.   40 mg at 10/23/23 1932    acetaminophen (Tylenol) tablet 650 mg  650 mg Q6HRS PRN Ken Chase M.D.        Pharmacy Consult Request ...Pain Management Review 1 Each  1 Each PHARMACY TO DOSE Ken Chase M.D.        oxyCODONE immediate-release (Roxicodone) tablet 2.5 mg  2.5 mg Q3HRS PRN Ken Chase M.D.        Or    oxyCODONE immediate-release (Roxicodone) tablet 5 mg  5 mg Q3HRS PRN Ken Chase M.D.   5 mg at 10/24/23 1514    Or    HYDROmorphone (Dilaudid) injection 0.25 mg  0.25 mg Q3HRS PRN Ken Chase M.D.   0.25 mg at 10/24/23 0916    amLODIPine (Norvasc) tablet 10 mg  10 mg Q DAY Ken Chase M.D.   10 mg at 10/27/23 0511    hydrALAZINE (Apresoline) injection 20 mg  20 mg Q4HRS PRN SARAY Velasquez.       Last reviewed on 10/26/2023 12:50 PM by Leeanne Yang R.N.       Review of Systems:  Review of Systems   Constitutional:  Negative for chills, fever and malaise/fatigue.   HENT:  Negative for hearing loss.    Eyes:  Negative for blurred vision.   Respiratory:  Negative for cough and shortness of breath.    Cardiovascular:  Negative for chest pain and leg swelling.   Gastrointestinal:  Negative for  abdominal pain, blood in stool, constipation, diarrhea, heartburn, melena, nausea and vomiting.   Genitourinary:  Negative for dysuria.   Musculoskeletal:  Negative for back pain.   Skin:  Negative for rash.   Neurological:  Negative for dizziness and weakness.   Psychiatric/Behavioral:  Negative for depression.    All other systems reviewed and are negative.        Vital signs:  Weight/BMI: Body mass index is 26.48 kg/m².  BP (!) 164/105   Pulse (!) 122   Temp 36.3 °C (97.4 °F) (Temporal)   Resp 17   Ht 1.524 m (5')   Wt 61.5 kg (135 lb 9.3 oz)   SpO2 97%   Vitals:    10/26/23 1533 10/26/23 1949 10/27/23 0326 10/27/23 0745   BP: (!) 168/108 (!) 161/112 (!) 141/90 (!) 164/105   Pulse:  98 70 (!) 122   Resp:  20 18 17   Temp:  36.4 °C (97.6 °F) 36.6 °C (97.9 °F) 36.3 °C (97.4 °F)   TempSrc:  Temporal Temporal Temporal   SpO2:  95% 96% 97%   Weight:       Height:         Oxygen Therapy:  Pulse Oximetry: 97 %, O2 (LPM): 0, O2 Delivery Device: None - Room Air    Intake/Output Summary (Last 24 hours) at 10/27/2023 0815  Last data filed at 10/26/2023 2030  Gross per 24 hour   Intake 560 ml   Output --   Net 560 ml         Physical Exam:  Physical Exam  Vitals and nursing note reviewed.   Constitutional:       General: She is not in acute distress.     Appearance: Normal appearance.   HENT:      Head: Normocephalic and atraumatic.      Right Ear: External ear normal.      Left Ear: External ear normal.      Nose: Nose normal.      Mouth/Throat:      Mouth: Mucous membranes are moist.      Pharynx: Oropharynx is clear.   Eyes:      General: No scleral icterus.  Cardiovascular:      Rate and Rhythm: Normal rate and regular rhythm.      Pulses: Normal pulses.      Heart sounds: Normal heart sounds.   Pulmonary:      Effort: Pulmonary effort is normal. No respiratory distress.      Breath sounds: Normal breath sounds.   Abdominal:      General: Abdomen is flat. Bowel sounds are normal. There is no distension.       Palpations: Abdomen is soft.      Tenderness: There is no abdominal tenderness.   Musculoskeletal:         General: Normal range of motion.      Cervical back: Normal range of motion.   Skin:     General: Skin is warm and dry.      Capillary Refill: Capillary refill takes less than 2 seconds.   Neurological:      Mental Status: She is alert and oriented to person, place, and time.   Psychiatric:         Mood and Affect: Mood normal.         Behavior: Behavior normal.             Labs:  Recent Labs     10/25/23  0547 10/26/23  0418 10/26/23  1202 10/27/23  0644   SODIUM 139 137  --  133*   POTASSIUM 3.2* 2.9* 3.6 3.7   CHLORIDE 102 103  --  100   CO2 25 24  --  20   BUN 6* 5*  --  13   CREATININE 0.66 0.46*  --  0.62   CALCIUM 9.2 8.6  --  10.3       Recent Labs     10/25/23  0547 10/26/23  0418 10/27/23  0644   GLUCOSE 90 83 140*       Recent Labs     10/25/23  0547   WBC 9.1       Recent Labs     10/25/23  0547   RBC 4.65   HEMOGLOBIN 12.6   HEMATOCRIT 39.0   PLATELETCT 290       Recent Results (from the past 24 hour(s))   POTASSIUM SERUM (K)    Collection Time: 10/26/23 12:02 PM   Result Value Ref Range    Potassium 3.6 3.6 - 5.5 mmol/L   Histology Request    Collection Time: 10/26/23 12:59 PM   Result Value Ref Range    Pathology Request Sent to [facility]    Basic Metabolic Panel    Collection Time: 10/27/23  6:44 AM   Result Value Ref Range    Sodium 133 (L) 135 - 145 mmol/L    Potassium 3.7 3.6 - 5.5 mmol/L    Chloride 100 96 - 112 mmol/L    Co2 20 20 - 33 mmol/L    Glucose 140 (H) 65 - 99 mg/dL    Bun 13 8 - 22 mg/dL    Creatinine 0.62 0.50 - 1.40 mg/dL    Calcium 10.3 8.5 - 10.5 mg/dL    Anion Gap 13.0 7.0 - 16.0   ESTIMATED GFR    Collection Time: 10/27/23  6:44 AM   Result Value Ref Range    GFR (CKD-EPI) 113 >60 mL/min/1.73 m 2       Radiology Review:  CT-ABDOMEN-PELVIS WITH   Final Result      1.  Borderline bowel wall thickening suggested along the descending colon and sigmoid colon which may be  overestimated as the bowel is not well distended. Index of suspicion is augmented by history of inflammatory bowel disease.   2.  Small amount of free fluid in the cul-de-sac. Nonspecific. Possibly physiologic related to ovulation. Similar but lesser finding on prior exam.            MDM (Data Review):   -Records reviewed and summarized in current documentation  -I personally reviewed and interpreted the laboratory results  -I personally reviewed the radiology images    Assessment/Recommendations:  Impressions:  ## Acute viral versus bacterial gastroenteritis-patient denies any change to medications, job, stress, or exposure to illness.  She presents with several day history of viral type illness with nausea, vomiting, diarrhea.  Need to rule out infectious etiology, adrenal insufficiency, and celiac disease.  C. difficile negative. AM cortisol slightly elevated.  -Stool cultures and celiac panel pending     ##Crohn's disease-patient has a 12-year history of Crohn's disease with associated joint pain.  She has not had a flare for over a year.  She has mildly elevated CRP and ESR.  CT scan with borderline bowel wall thickening.  She has not had colonoscopy in almost 5 years.  She has been compliant with her weekly Humira and [facility], reports there has been no discussion about changing her medications.    -Fecal calprotectin and stool cultures pending  -Colonoscopy reveals pancolitis with ulcerations, patient developing resistance and no response to current therapy.Biologic needs changed. She has an appointment on 12/12 with [facility] and will discuss with them.  -Steroid taper, start with prednisone 40 mg and taper by 10 mg weekly  -OK to continue weekly Humira on Tuesdays since patient already has a supply  -Of note, patient reports feeling off for several months with increasing akle and elbow joint pain, she has likely been resistant to treatment for some time  -Discharging today      Plan  discussed with patient, RN, Dr. Marquez and Dr. Dorsey    Core Quality Measures   Reviewed items::  Labs, Medications and Radiology reports reviewed

## 2023-10-27 NOTE — PROGRESS NOTES
Pt alert/oriented x4, denies pain and N/V. Tolerating IVF. Up ad akshat to the bathroom, steady gait. Call light within reach, personal belongings available, bed in lowest position, treaded socks on, and hourly rounding in place.

## 2023-10-27 NOTE — DISCHARGE SUMMARY
Discharge Summary    CHIEF COMPLAINT ON ADMISSION  Chief Complaint   Patient presents with    Nausea/Vomiting/Diarrhea     Since Wednesday. Patient states she is unable to hold down water     Abdominal Cramping     X4 days. Patient reports history of Crohns       Reason for Admission  Abd Pain     Admission Date  10/23/2023    CODE STATUS  Prior    HPI & HOSPITAL COURSE  Mi Bay is a 43 y.o. female with past medical history of Crohn's disease on Humira, hepatitis C, who presented 10/23/2023 with 4 days history of worsening abdominal pain associated with nausea, vomiting, watery diarrhea.  Denies fever, melena, chest pain, shortness of breath.  Reports she takes Humira every week.  She was last hospitalized for Crohn's flareup on 11/8/2022.     On arrival to ED, remained hypertensive.   Labs reviewed and noted with normal white count, hypokalemia potassium 3.3, slightly elevated CRP.  CT abdomen pelvis with contrast showing borderline colonic wall thickening. Patient was given supportive treatment with IV fluids and medications for nausea, vomiting, abdominal pain. Symptoms improved. GI consulted, patient underwent EGD and colonoscopy showing pancolitis with erosions and ulcers. Biopsies taken, show nonspecific inflammatory pattern; skip lesions consistent with hx Crohn's disease. Patient is treated for Crohn's flare with steroid taper. She is to continue home humira and follow up closely with her GI team for consideration of change of biologic therapy. Patient's symptoms improved. Patient also started on amlodipine for hypertension. She is discharged to home.        Therefore, she is discharged in fair and stable condition to home with close outpatient follow-up.    The patient met 2-midnight criteria for an inpatient stay at the time of discharge.    Discharge Date  10/27/2023    FOLLOW UP ITEMS POST DISCHARGE  Take medications as prescribed.  Follow up with PCP and GI.    DISCHARGE DIAGNOSES  Principal  Problem:    Crohn's disease (HCC) (POA: Yes)  Active Problems:    Nausea vomiting and diarrhea (POA: Yes)    Diarrhea (POA: Unknown)    Intractable nausea and vomiting (POA: Unknown)    Dehydration (POA: Unknown)    Crohn's colitis, unspecified complication (HCC) (POA: Yes)  Resolved Problems:    * No resolved hospital problems. *      FOLLOW UP  No future appointments.  [facility]  [address] [place]  [place] [address]  [phone]  Schedule an appointment as soon as possible for a visit in 1 week(s)        MEDICATIONS ON DISCHARGE     Medication List        START taking these medications        Instructions   amLODIPine 10 MG Tabs  Start taking on: October 28, 2023  Commonly known as: Norvasc   Take 1 Tablet by mouth every day.  Dose: 10 mg     predniSONE 20 MG Tabs  Start taking on: October 27, 2023  Commonly known as: Deltasone   Take 2 Tablets by mouth every day for 10 days, THEN 1.5 Tablets every day for 10 days, THEN 1 Tablet every day for 10 days, THEN 0.5 Tablets every day for 10 days (Please call pharmacy for refills and continuation of therapy)            CONTINUE taking these medications        Instructions   ascorbic acid 500 MG Tabs  Commonly known as: Ascorbic Acid   Take 500 mg by mouth every day.  Dose: 500 mg     cyanocobalamin 500 MCG Tabs  Commonly known as: Vitamin B-12   Take 500 mcg by mouth every day.  Dose: 500 mcg     Humira 40 MG/0.4ML Pskt  Generic drug: Adalimumab   Inject 40 mg under the skin every 7 days. Indications: Crohn's Disease  Dose: 40 mg     magnesium oxide 400 MG Tabs tablet  Commonly known as: Mag-Ox   Take 400 mg by mouth every day.  Dose: 400 mg     Probiotic Daily Caps   Take 1 Capsule by mouth every day.  Dose: 1 Capsule     therapeutic multivitamin-minerals Tabs   Take 1 Tablet by mouth every day.  Dose: 1 Tablet     vitamin D3 1000 Unit (25 mcg) Tabs  Commonly known as: Cholecalciferol   Take 1,000 Units by mouth every day.  Dose: 1,000 Units      Zinc 50 MG Tabs   Take 50 mg by mouth every day.  Dose: 50 mg              Allergies  Allergies   Allergen Reactions    Trazodone Itching    Promethazine Anxiety           Quetiapine Itching       DIET  No orders of the defined types were placed in this encounter.      ACTIVITY  As tolerated.  Weight bearing as tolerated    CONSULTATIONS  GI    PROCEDURES  EGD/colon    OPERATIVE REPORT           PATIENT: Mi Bay  1980        PREOPERATIVE DIAGNOSIS/INDICATION: r/o IBD flare.      POSTOPERATIVE DIAGNOSES:   Pancolitis, with multiple ulcers, erosions with some oozing.   Multiple pseudopolyps. Haque 3.   Small bowel mucosa appears normal.   Biopsy is done at terminal ileum, random right colon, Transverse, left and sigmoid/rectal, total 5 jars for this procedure.      PROCEDURE: COLONOSCOPY     PHYSICIAN: Dexter Dorsey MD MPH     CONSENT:  OBTAINED. The risks, benefits and alternatives of the procedure were discussed in details. The risks include and are not limited to bleeding, infection, perforation, missed lesions, and sedations risks (cardiopulmonary compromise and allergic reaction to medications).     ANESTHESIA:  Per anesthesiologist.     LOCATION: [facility]     DESCRIPTION:  The patient presented to the procedure room.  After routine checkup was performed, patient was brought into endoscopy suite.  Patient was placed on his left lateral decubitus position.  Patient was sedated by anesthesia. Vital signs were monitored throughout procedure.  Oxygenation support was provided throughout procedure. Digital rectal examination was performed.  Then, a colonoscope was inserted into patient's anus, advanced to the terminal ileum under direct visualization.  Once the site was reached and examined, the colonoscope was withdrawn and procedure was terminated. Withdrawal time was at least 6 minutes to ensure adequate examination.     The patient tolerated the procedure well.  There were no immediate  complications.     The quality of the bowel preparation was not adequate.        FINDINGS:  Pancolitis, with multiple ulcers, erosions with some oozing.   Multiple pseudopolyps. Haque 3.   Small bowel mucosa appears normal.   Biopsy is done at terminal ileum, random right colon, Transverse, left and sigmoid/rectal, total 5 jars for this procedure.      RECOMMENDATIONS:  Please refer to detailed recs in GI rounding note.   Okay to resume diet.       SURGICAL PATHOLOGY CONSULTATION       FINAL DIAGNOSIS:     A. Duodenum biopsy:          Benign duodenal mucosa with no diagnostic abnormality.          No evidence of inflammatory bowel disease or celiac sprue.   B. Gastric biopsy:          Benign gastric mucosa with no diagnostic abnormality.          No Helicobacter pylori identified on Warthin-Starry stain.   C. Terminal ileum biopsy:          Benign small bowel mucosa with no diagnostic abnormality.          No evidence of active inflammatory bowel disease.   D. Random right colon biopsy:          Chronic colitis with mild activity.          No dysplasia identified.   E. Transverse colon biopsy:          Benign colonic mucosa with no diagnostic abnormality.          No definite evidence of inflammatory bowel disease.   F. Random left colon biopsy:          Chronic colitis with mild activity.          No dysplasia identified.   G. Rectosigmoid biopsy:          Chronic proctitis with mild to moderate activity and focal           ulceration.          No dysplasia identified.     COMMENT:   The inflammatory pattern is not especially specific, but the skipping   of the transverse colon is suggestive of Crohn's colitis.     LABORATORY  Lab Results   Component Value Date    SODIUM 133 (L) 10/27/2023    POTASSIUM 3.7 10/27/2023    CHLORIDE 100 10/27/2023    CO2 20 10/27/2023    GLUCOSE 140 (H) 10/27/2023    BUN 13 10/27/2023    CREATININE 0.62 10/27/2023    CREATININE 0.6 01/09/2007        Lab Results   Component Value Date     WBC 9.1 10/25/2023    HEMOGLOBIN 12.6 10/25/2023    HEMATOCRIT 39.0 10/25/2023    PLATELETCT 290 10/25/2023        Total time of the discharge process exceeds 35 minutes.

## 2023-10-29 LAB
BACTERIA STL CULT: NORMAL
E COLI SXT1+2 STL IA: NORMAL
SIGNIFICANT IND 70042: NORMAL
SITE SITE: NORMAL
SOURCE SOURCE: NORMAL

## 2023-11-29 ENCOUNTER — OFFICE VISIT (OUTPATIENT)
Dept: MEDICAL GROUP | Facility: MEDICAL CENTER | Age: 43
End: 2023-11-29
Payer: COMMERCIAL

## 2023-11-29 VITALS
OXYGEN SATURATION: 100 % | RESPIRATION RATE: 20 BRPM | HEART RATE: 83 BPM | HEIGHT: 70 IN | BODY MASS INDEX: 19.39 KG/M2 | DIASTOLIC BLOOD PRESSURE: 78 MMHG | WEIGHT: 135.4 LBS | TEMPERATURE: 98.3 F | SYSTOLIC BLOOD PRESSURE: 118 MMHG

## 2023-11-29 DIAGNOSIS — Z13.0 SCREENING, ANEMIA, DEFICIENCY, IRON: ICD-10-CM

## 2023-11-29 DIAGNOSIS — I10 PRIMARY HYPERTENSION: ICD-10-CM

## 2023-11-29 DIAGNOSIS — B18.2 CHRONIC HEPATITIS C WITHOUT HEPATIC COMA (HCC): ICD-10-CM

## 2023-11-29 DIAGNOSIS — Z86.2 HISTORY OF ANEMIA: ICD-10-CM

## 2023-11-29 DIAGNOSIS — Z11.59 NEED FOR HEPATITIS B SCREENING TEST: ICD-10-CM

## 2023-11-29 DIAGNOSIS — K50.119 CROHN'S DISEASE OF COLON WITH COMPLICATION (HCC): ICD-10-CM

## 2023-11-29 DIAGNOSIS — F33.2 MAJOR DEPRESSIVE DISORDER, RECURRENT, SEVERE WITHOUT PSYCHOTIC FEATURES (HCC): ICD-10-CM

## 2023-11-29 DIAGNOSIS — Z23 NEED FOR INFLUENZA VACCINATION: ICD-10-CM

## 2023-11-29 DIAGNOSIS — Z13.220 SCREENING FOR HYPERLIPIDEMIA: ICD-10-CM

## 2023-11-29 DIAGNOSIS — Z13.21 ENCOUNTER FOR VITAMIN DEFICIENCY SCREENING: ICD-10-CM

## 2023-11-29 DIAGNOSIS — Z12.31 ENCOUNTER FOR SCREENING MAMMOGRAM FOR MALIGNANT NEOPLASM OF BREAST: ICD-10-CM

## 2023-11-29 DIAGNOSIS — F10.21 ALCOHOL USE DISORDER, MODERATE, IN EARLY REMISSION (HCC): ICD-10-CM

## 2023-11-29 DIAGNOSIS — Z13.29 SCREENING FOR THYROID DISORDER: ICD-10-CM

## 2023-11-29 DIAGNOSIS — F43.12 CHRONIC POST-TRAUMATIC STRESS DISORDER (PTSD): ICD-10-CM

## 2023-11-29 DIAGNOSIS — Z83.3 FAMILY HISTORY OF DIABETES MELLITUS: ICD-10-CM

## 2023-11-29 DIAGNOSIS — F41.1 GENERALIZED ANXIETY DISORDER: ICD-10-CM

## 2023-11-29 DIAGNOSIS — Z13.1 SCREENING FOR DIABETES MELLITUS: ICD-10-CM

## 2023-11-29 DIAGNOSIS — F11.11 HISTORY OF OPIOID ABUSE (HCC): ICD-10-CM

## 2023-11-29 DIAGNOSIS — Z87.898 HISTORY OF ALCOHOL USE DISORDER: ICD-10-CM

## 2023-11-29 PROCEDURE — 3074F SYST BP LT 130 MM HG: CPT | Performed by: NURSE PRACTITIONER

## 2023-11-29 PROCEDURE — 90471 IMMUNIZATION ADMIN: CPT | Performed by: NURSE PRACTITIONER

## 2023-11-29 PROCEDURE — 90686 IIV4 VACC NO PRSV 0.5 ML IM: CPT | Performed by: NURSE PRACTITIONER

## 2023-11-29 PROCEDURE — 99204 OFFICE O/P NEW MOD 45 MIN: CPT | Mod: 25 | Performed by: NURSE PRACTITIONER

## 2023-11-29 PROCEDURE — 3078F DIAST BP <80 MM HG: CPT | Performed by: NURSE PRACTITIONER

## 2023-11-29 RX ORDER — AMLODIPINE BESYLATE 10 MG/1
10 TABLET ORAL DAILY
Qty: 30 TABLET | Refills: 0 | Status: SHIPPED | OUTPATIENT
Start: 2023-11-29 | End: 2023-12-02

## 2023-11-29 RX ORDER — ASCORBIC ACID 500 MG
500 TABLET ORAL DAILY
Qty: 30 TABLET | Refills: 0
Start: 2023-11-29

## 2023-11-29 ASSESSMENT — FIBROSIS 4 INDEX: FIB4 SCORE: 0.91

## 2023-11-29 ASSESSMENT — PATIENT HEALTH QUESTIONNAIRE - PHQ9
SUM OF ALL RESPONSES TO PHQ QUESTIONS 1-9: 0
4. FEELING TIRED OR HAVING LITTLE ENERGY: NOT AT ALL
9. THOUGHTS THAT YOU WOULD BE BETTER OFF DEAD, OR OF HURTING YOURSELF: NOT AT ALL
1. LITTLE INTEREST OR PLEASURE IN DOING THINGS: NOT AT ALL
3. TROUBLE FALLING OR STAYING ASLEEP OR SLEEPING TOO MUCH: NOT AT ALL
8. MOVING OR SPEAKING SO SLOWLY THAT OTHER PEOPLE COULD HAVE NOTICED. OR THE OPPOSITE, BEING SO FIGETY OR RESTLESS THAT YOU HAVE BEEN MOVING AROUND A LOT MORE THAN USUAL: NOT AT ALL
2. FEELING DOWN, DEPRESSED, IRRITABLE, OR HOPELESS: NOT AT ALL
7. TROUBLE CONCENTRATING ON THINGS, SUCH AS READING THE NEWSPAPER OR WATCHING TELEVISION: NOT AT ALL
6. FEELING BAD ABOUT YOURSELF - OR THAT YOU ARE A FAILURE OR HAVE LET YOURSELF OR YOUR FAMILY DOWN: NOT AL ALL
5. POOR APPETITE OR OVEREATING: NOT AT ALL
SUM OF ALL RESPONSES TO PHQ9 QUESTIONS 1 AND 2: 0

## 2023-11-29 NOTE — LETTER
November 29, 2023        Patient: Mi Bay   YOB: 1980   Date of Visit: 11/29/2023           To Whom It May Concern:    It is my medical opinion that Mi Bay was unable to work on Sunday, October 29, 2023 due to a medical condition.    If you have any questions or concerns, please don't hesitate to call.        Sincerely,          BRUNO Minor.  Electronically Signed    [facility]  [address] [id number] [phone] (Phone)  [phone] (Fax)

## 2023-11-29 NOTE — ASSESSMENT & PLAN NOTE
She was recently hospitalized with flares.  She was admitted in october with flares.  Now weaning off prednisone.  No diarrhea, abd pain or bloody stool.  She was admitted with nausea and vomiting.  She also had flare last year at the same time.  Could have been more stress before start of flare.

## 2023-11-29 NOTE — PROGRESS NOTES
Subjective     Mi Bay is a 43 y.o. female who presents with Establish Care            HPI  Seen to establish care.  She ifeeling well after recent hospitalization.  She is due updated mammo and pap smear.    History of alcohol use disorder  No current issues.  Stopped drinking in 2016    Major depressive disorder, recurrent, severe without psychotic features (HCC)  Resolved. No med tx needed.  This occurred in 2016    Chronic hepatitis C without hepatic coma (HCC)  She was dx around 2012.  No current sx.  Never had tx.  Will need Hep C RNA PCR    Chronic post-traumatic stress disorder (PTSD)  She is using yoga and other therapies for tx d/t childhood trauma.  No med tx at this time.     History of anemia  No recent cbc showing anemia.  No sx of bleeding    BRISA (generalized anxiety disorder)  Nno sx or tx needed.  Using alternative methods for relief    Crohn's disease of colon with complication  She was recently hospitalized with flares.  She was admitted in october with flares.  Now weaning off prednisone.  No diarrhea, abd pain or bloody stool.  She was admitted with nausea and vomiting.  She also had flare last year at the same time.  Could have been more stress before start of flare.       Patient Active Problem List   Diagnosis    Chronic hepatitis C without hepatic coma (HCC)    Crohn's disease of colon with complication (HCC)    Major depressive disorder, recurrent, severe without psychotic features (HCC)    History of anemia    Chronic post-traumatic stress disorder (PTSD)    BRISA (generalized anxiety disorder)    History of alcohol use disorder    History of opioid abuse (HCC)     Current Outpatient Medications   Medication Sig Dispense Refill    ascorbic acid (ASCORBIC ACID) 500 MG Tab Take 1 Tablet by mouth every day. 30 Tablet 0    amLODIPine (NORVASC) 10 MG Tab Take 1 Tablet by mouth every day. 30 Tablet 0    vitamin D3 (CHOLECALCIFEROL) 1000 Unit (25 mcg) Tab Take 1,000 Units by mouth every  day.      cyanocobalamin (VITAMIN B-12) 500 MCG Tab Take 500 mcg by mouth every day.      therapeutic multivitamin-minerals (THERAGRAN-M) Tab Take 1 Tablet by mouth every day.      Zinc 50 MG Tab Take 50 mg by mouth every day.      magnesium oxide (MAG-OX) 400 MG Tab tablet Take 400 mg by mouth every day.      Probiotic Product (PROBIOTIC DAILY) Cap Take 1 Capsule by mouth every day.      Adalimumab (HUMIRA) 40 MG/0.4ML Prefilled Syringe Kit Inject 40 mg under the skin every 7 days. Indications: Crohn's Disease       No current facility-administered medications for this visit.     Trazodone, Promethazine, and Quetiapine  Past Medical History:   Diagnosis Date    Alcohol use disorder, moderate, in early remission (HCC) 11/07/2016    Anxiety     Chronic post-traumatic stress disorder (PTSD) 11/07/2016    Crohn's disease ([facility])     Depression     Hepatitis C     History of alcohol use disorder 11/29/2023    History of anemia 10/10/2016    -Anemia of chronic disease vs. Anemia secondary to blood loss initially vs other etiology.  -H/H today is 10.5/33.7( yest 10.6/33.3)  -Bedside stool guaic had been positive on admission. Occult blood today negative for blood.  -Will continue to trend. Might need further workup if it continues to trend down.    History of opioid abuse ([facility]) 11/29/2023     Social History     Socioeconomic History    Marital status: Single     Spouse name: Not on file    Number of children: Not on file    Years of education: Not on file    Highest education level: Not on file   Occupational History    Not on file   Tobacco Use    Smoking status: Never     Passive exposure: Never    Smokeless tobacco: Never   Vaping Use    Vaping Use: Never used   Substance and Sexual Activity    Alcohol use: No     Comment: last use 7/17/2016; was daily    Drug use: No    Sexual activity: Not Currently   Other Topics Concern    Not on file   Social History Narrative    Not on file     Social Determinants of Health      Financial Resource Strain: Not on file   Food Insecurity: Not on file   Transportation Needs: Not on file   Physical Activity: Not on file   Stress: Not on file   Social Connections: Not on file   Intimate Partner Violence: Not on file   Housing Stability: Not on file     Family History   Problem Relation Age of Onset    Thyroid Mother     Alcohol abuse Father     Bipolar disorder Father         father possibly bipolar    No Known Problems Brother     No Known Problems Brother     No Known Problems Brother     Diabetes Maternal Grandmother     Diabetes Paternal Grandmother      Past Surgical History:   Procedure Laterality Date    [place] COLONOSCOPY,DIAGNOSTIC N/A 10/26/2023    Procedure: COLONOSCOPY;  Surgeon: Natalia Dorsey M.D.;  Location: SURGERY SAME DAY [facility];  Service: Gastroenterology    W[place] UPPER GI ENDOSCOPY,DIAGNOSIS N/A 10/26/2023    Procedure: GASTROSCOPY;  Surgeon: Natalia Dorsey M.D.;  Location: SURGERY SAME DAY [facility];  Service: Gastroenterology    [place] UPPER GI ENDOSCOPY,BIOPSY N/A 10/26/2023    Procedure: GASTROSCOPY, WITH BIOPSY;  Surgeon: Natalia Dorsey M.D.;  Location: SURGERY SAME DAY [facility];  Service: Gastroenterology    [place] COLONOSCOPY,BIOPSY N/A 10/26/2023    Procedure: COLONOSCOPY, WITH BIOPSY;  Surgeon: Natalia Dorsey M.D.;  Location: SURGERY SAME DAY [facility];  Service: Gastroenterology    COLONOSCOPY WITH BIOPSY N/A 6/21/2016    Procedure: COLONOSCOPY WITH BIOPSY;  Surgeon: Jay Leggett M.D.;  Location: ENDOSCOPY [facility];  Service:        ROS     Review of Systems   Constitutional: Negative.  Negative for fever, chills, weight loss, malaise/fatigue and diaphoresis.   HENT: Negative.  Negative for hearing loss, ear pain, nosebleeds, congestion, sore throat, neck pain, tinnitus and ear discharge.    Eyes: Negative.  Negative for blurred vision, double vision, photophobia, pain, discharge and redness.   Respiratory: Negative.  Negative for cough, hemoptysis, sputum  "production, shortness of breath, wheezing and stridor.    Cardiovascular: Negative.  Negative for chest pain, palpitations, orthopnea, claudication, leg swelling and PND.   Gastrointestinal: Negative.  Negative for heartburn, nausea, vomiting, abdominal pain,  constipation, blood in stool and melena.   Genitourinary: Negative.  Negative for dysuria, urgency, frequency, incontinence, hematuria and flank pain.   Musculoskeletal: Negative.  Negative for myalgias, back pain, joint pain and falls.   Skin: Negative.  Negative for itching and rash.   Neurological: Negative.  Negative for dizziness, tingling, tremors, sensory change, speech change, focal weakness, seizures, loss of consciousness, weakness and headaches.   Endo/Heme/Allergies: Negative.  Negative for environmental allergies and polydipsia. Does not bruise/bleed easily.   Psychiatric/Behavioral: Negative.  Negative for depression, suicidal ideas, hallucinations, memory loss and substance abuse. The patient is not nervous/anxious and does not have insomnia.    All other systems reviewed and are negative.          Objective     /78   Pulse 83   Temp 36.8 °C (98.3 °F) (Temporal)   Resp 20   Ht 1.778 m (5' 10\")   Wt 61.4 kg (135 lb 6.4 oz)   SpO2 100%   BMI 19.43 kg/m²      Physical Exam     Physical Exam   Vitals reviewed.  Constitutional: oriented to person, place, and time. appears well-developed and well-nourished. No distress.   HENT: Head: Normocephalic and atraumatic. Bilateral tympanic membranes wnl w/o bulging.  Right Ear: External ear normal. Left Ear: External ear normal. Nose: Nose normal.  Mouth/Throat: Oropharynx is clear and moist. No oropharyngeal exudate. dung tm wnl. Eyes: Conjunctivae and EOM are normal. Pupils are equal, round, and reactive to light. Right eye exhibits no discharge. Left eye exhibits no discharge. No scleral icterus.    Neck: Normal range of motion. Neck supple. No JVD present.   Cardiovascular: Normal rate, " regular rhythm, normal heart sounds and intact distal pulses.  Exam reveals no gallop and no friction rub.  No murmur heard.  No carotid bruits   Pulmonary/Chest: Effort normal and breath sounds normal. No stridor. No respiratory distress. no wheezes or rales. exhibits no tenderness.   Abdominal: Soft. Bowel sounds are normal. exhibits no distension and no mass. No tenderness. no rebound and no guarding.   Musculoskeletal: Normal range of motion. exhibits no edema or tenderness.  dung pedal pulses 2+.  Lymphadenopathy:  no cervical or supraclavicular adenopathy.   Neurological: alert and oriented to person, place, and time. has normal reflexes. displays normal reflexes. No cranial nerve deficit. exhibits normal muscle tone. Coordination normal.   Skin: Skin is warm and dry. No rash noted. no diaphoresis. No erythema. No pallor.   Psychiatric: normal mood and affect. behavior is normal.     Assessment & Plan        1. Crohn's disease of colon with complication (HCC)      recent flare [facility] hospitalization.  now weaning steroids off.  sx improving      2. Primary hypertension  amLODIPine (NORVASC) 10 MG Tab    new dx from recent hospitalization.  stable and well controlled on norvasc.  do updated lab, f/u for review and pap smear      3. History of alcohol use disorder      resolved since 2016.  no tx needed      4. Major depressive disorder, recurrent, severe without psychotic features (HCC)      no current sx or med tx needed      5. History of opioid abuse (HCC)      resolved since 2016. no tx needed      6. Chronic hepatitis C without hepatic coma (HCC)  HCV RNA QUANT, PCR    she doesn't know what her viral levels are.  never had tx.  do updated Hep C RNA, PCR.      7. Chronic post-traumatic stress disorder (PTSD)      stable at this time.  using alternative therapies for tx      8. History of anemia      resolved since 2016. no tx needed      9. BRISA (generalized anxiety disorder)      no current sx or med tx needed       10. Family history of diabetes mellitus  HEMOGLOBIN A1C    paternal grandfather with DM.  check CMP and a1c WITH REST of lab. f/u for review      11. Screening for diabetes mellitus  HEMOGLOBIN A1C    FH of DM.  will do lab w/A1c      12. Need for hepatitis B screening test  HEP B SURFACE AB    does not know if had hep b vaccines.  will do hep b AB and give vaccine series if needed      13. Need for influenza vaccination  INFLUENZA VACCINE QUAD INJ (PF)    flu shot given today      14. Encounter for screening mammogram for malignant neoplasm of breast  MA-SCREENING MAMMO BILAT W/CAD

## 2023-11-30 NOTE — TELEPHONE ENCOUNTER
Request already responded to by other means (e.g. phone or fax)   Received request via: Pharmacy    Was the patient seen in the last year in this department? Yes    Does the patient have an active prescription (recently filled or refills available) for medication(s) requested? yes    Does the patient have long term Plus and need 100 day supply (blood pressure, diabetes and cholesterol meds only)? Patient does not have SCP     Requested Prescriptions     Pending Prescriptions Disp Refills    amLODIPine (NORVASC) 10 MG Tab [Pharmacy Med Name: AMLODIPINE BESYLATE 10 MG TAB] 30 Tablet 0     Sig: TAKE 1 TABLET BY MOUTH EVERY DAY

## 2023-12-02 RX ORDER — AMLODIPINE BESYLATE 10 MG/1
10 TABLET ORAL DAILY
Qty: 90 TABLET | Refills: 0 | Status: SHIPPED
Start: 2023-12-02 | End: 2024-02-28 | Stop reason: SDUPTHER

## 2024-01-31 ENCOUNTER — APPOINTMENT (OUTPATIENT)
Dept: MEDICAL GROUP | Facility: MEDICAL CENTER | Age: 44
End: 2024-01-31
Payer: COMMERCIAL

## 2024-02-06 ENCOUNTER — APPOINTMENT (OUTPATIENT)
Dept: MEDICAL GROUP | Facility: MEDICAL CENTER | Age: 44
End: 2024-02-06
Payer: COMMERCIAL

## 2024-02-28 ENCOUNTER — OFFICE VISIT (OUTPATIENT)
Dept: MEDICAL GROUP | Facility: MEDICAL CENTER | Age: 44
End: 2024-02-28
Payer: COMMERCIAL

## 2024-02-28 VITALS
HEIGHT: 60 IN | HEART RATE: 82 BPM | DIASTOLIC BLOOD PRESSURE: 90 MMHG | TEMPERATURE: 97.8 F | SYSTOLIC BLOOD PRESSURE: 130 MMHG | OXYGEN SATURATION: 100 % | BODY MASS INDEX: 27.51 KG/M2 | WEIGHT: 140.13 LBS

## 2024-02-28 DIAGNOSIS — F33.1 MODERATE EPISODE OF RECURRENT MAJOR DEPRESSIVE DISORDER (HCC): ICD-10-CM

## 2024-02-28 DIAGNOSIS — K50.119 CROHN'S DISEASE OF COLON WITH COMPLICATION (HCC): ICD-10-CM

## 2024-02-28 DIAGNOSIS — I10 PRIMARY HYPERTENSION: ICD-10-CM

## 2024-02-28 PROCEDURE — 99214 OFFICE O/P EST MOD 30 MIN: CPT | Performed by: NURSE PRACTITIONER

## 2024-02-28 PROCEDURE — 3080F DIAST BP >= 90 MM HG: CPT | Performed by: NURSE PRACTITIONER

## 2024-02-28 PROCEDURE — 3075F SYST BP GE 130 - 139MM HG: CPT | Performed by: NURSE PRACTITIONER

## 2024-02-28 RX ORDER — DULOXETIN HYDROCHLORIDE 30 MG/1
30 CAPSULE, DELAYED RELEASE ORAL DAILY
Qty: 30 CAPSULE | Refills: 1 | Status: SHIPPED | OUTPATIENT
Start: 2024-02-28 | End: 2024-03-28

## 2024-02-28 RX ORDER — AMLODIPINE BESYLATE 10 MG/1
10 TABLET ORAL DAILY
Qty: 90 TABLET | Refills: 0 | Status: SHIPPED | OUTPATIENT
Start: 2024-02-28

## 2024-02-28 ASSESSMENT — PATIENT HEALTH QUESTIONNAIRE - PHQ9
5. POOR APPETITE OR OVEREATING: 1 - SEVERAL DAYS
SUM OF ALL RESPONSES TO PHQ QUESTIONS 1-9: 8
CLINICAL INTERPRETATION OF PHQ2 SCORE: 2

## 2024-02-28 ASSESSMENT — FIBROSIS 4 INDEX: FIB4 SCORE: 0.91

## 2024-02-28 NOTE — PROGRESS NOTES
Subjective:     Mi Bay is a 43 y.o. female who presents with depression.    HPI:   Seen in f/u for depression.  She has been having more depression.  Not on med for tx in past.  Her only tx in past is xanax.  That was habit forming.  Over the last several weeks sheis noting sx of saddness.  She feels that these are new issues.  She is loosing interest in doing things or being with her friends. Denies SI/HI. She has been having more joint pain and abd pain.  Due to her sx she was off work 2/22, 23, 25/24. Needs letter.   She has seen Devan for her ccrohn's.  She  will f/u with him in May.  She is now on humira. It is not helping her pain.  They are thinking that her body isgetting used to it and that is why she not having good results.  They are considering repeat remicaide.  BP is elevated today but normal last viist.  Taking norvasc approp.  She is on healthy diet.  She is walking her dog for exercise.    Patient Active Problem List    Diagnosis Date Noted    History of alcohol use disorder 11/29/2023    History of opioid abuse (HCC) 11/29/2023    Chronic post-traumatic stress disorder (PTSD) 11/07/2016    BRISA (generalized anxiety disorder) 11/07/2016    History of anemia 10/10/2016    Major depressive disorder, recurrent, severe without psychotic features (HCC) 09/30/2016    Crohn's disease of colon with complication (HCC) 07/26/2016    Chronic hepatitis C without hepatic coma (HCC) 05/09/2016       Current medicines (including changes today)  Current Outpatient Medications   Medication Sig Dispense Refill    DULoxetine (CYMBALTA) 30 MG Cap DR Particles Take 1 Capsule by mouth every day. 30 Capsule 1    amLODIPine (NORVASC) 10 MG Tab Take 1 Tablet by mouth every day. 90 Tablet 0    ascorbic acid (ASCORBIC ACID) 500 MG Tab Take 1 Tablet by mouth every day. 30 Tablet 0    vitamin D3 (CHOLECALCIFEROL) 1000 Unit (25 mcg) Tab Take 1,000 Units by mouth every day.      cyanocobalamin (VITAMIN B-12) 500 MCG  Tab Take 500 mcg by mouth every day.      therapeutic multivitamin-minerals (THERAGRAN-M) Tab Take 1 Tablet by mouth every day.      Zinc 50 MG Tab Take 50 mg by mouth every day.      magnesium oxide (MAG-OX) 400 MG Tab tablet Take 400 mg by mouth every day.      Probiotic Product (PROBIOTIC DAILY) Cap Take 1 Capsule by mouth every day.      Adalimumab (HUMIRA) 40 MG/0.4ML Prefilled Syringe Kit Inject 40 mg under the skin every 7 days. Indications: Crohn's Disease       No current facility-administered medications for this visit.       Allergies   Allergen Reactions    Promethazine Anxiety, Unspecified and Rash    Trazodone Itching    Quetiapine Itching       ROS  Constitutional: Negative. Negative for fever, chills, weight loss, malaise/fatigue and diaphoresis.   HENT: Negative. Negative for hearing loss, ear pain, nosebleeds, congestion, sore throat, neck pain, tinnitus and ear discharge.   Respiratory: Negative. Negative for cough, hemoptysis, sputum production, shortness of breath, wheezing and stridor.   Cardiovascular: Negative. Negative for chest pain, palpitations, orthopnea, claudication, leg swelling and PND.   Gastrointestinal: Denies nausea, vomiting, diarrhea, constipation, heartburn, melena or hematochezia.  Genitourinary: Denies dysuria, hematuria, urinary incontinence, frequency or urgency.        Objective:     BP (!) 130/90 (BP Location: Left arm, Patient Position: Sitting, BP Cuff Size: Adult)   Pulse 82   Temp 36.6 °C (97.8 °F) (Temporal)   Ht 1.524 m (5')   Wt 63.6 kg (140 lb 2 oz)   SpO2 100%  Body mass index is 27.37 kg/m².    Physical Exam:  Vitals reviewed.  Constitutional: Oriented to person, place, and time. appears well-developed and well-nourished. No distress.   Cardiovascular: Normal rate, regular rhythm, normal heart sounds and intact distal pulses. Exam reveals no gallop and no friction rub. No murmur heard. No carotid bruits.   Pulmonary/Chest: Effort normal and breath sounds  normal. No stridor. No respiratory distress. no wheezes or rales. exhibits no tenderness.   Musculoskeletal: Normal range of motion. exhibits no edema. dung pedal pulses 2+.  Lymphadenopathy: No cervical or supraclavicular adenopathy.   Neurological: Alert and oriented to person, place, and time. exhibits normal muscle tone.  Skin: Skin is warm and dry. No diaphoresis.   Psychiatric: Normal mood and affect. Behavior is normal.      Assessment and Plan:     The following treatment plan was discussed:    1. Moderate episode of recurrent major depressive disorder (HCC)  DULoxetine (CYMBALTA) 30 MG Cap DR Particles    Referral to Psychology    not controlled. start cymbalta daily. refer counseling. f/u 1 mo for lab review and sx eval.  also set up appt for pap smear      2. Primary hypertension  amLODIPine (NORVASC) 10 MG Tab    stable on med but BP elevated today. monitor bp at home. consider additional med if remains elevated.      3. Crohn's disease of colon with complication (HCC)      more recent pain. followed by Devan Hodge            Followup: Return in about 4 weeks (around 3/27/2024), or for lab review, sx eval.

## 2024-02-28 NOTE — LETTER
February 28, 2024        Patient: Mi Bay   YOB: 1980   Date of Visit: 2/28/2024           To Whom It May Concern:    It is my medical opinion that Mi Bay was unable to work on February 22, 23, and 25, 2024 due to a medical condition.    If you have any questions or concerns, please don't hesitate to call.        Sincerely,          BRUNO Minor.  Electronically Signed    [facility]  [id number] [id number] [phone] (Phone)  [phone] (Fax)

## 2024-05-03 DIAGNOSIS — F33.1 MODERATE EPISODE OF RECURRENT MAJOR DEPRESSIVE DISORDER (HCC): ICD-10-CM

## 2024-05-03 RX ORDER — DULOXETIN HYDROCHLORIDE 30 MG/1
30 CAPSULE, DELAYED RELEASE ORAL DAILY
Qty: 30 CAPSULE | Refills: 0 | Status: SHIPPED | OUTPATIENT
Start: 2024-05-03

## 2024-05-30 DIAGNOSIS — I10 PRIMARY HYPERTENSION: ICD-10-CM

## 2024-05-30 NOTE — TELEPHONE ENCOUNTER
Received request via: Patient    Was the patient seen in the last year in this department? Yes    Does the patient have an active prescription (recently filled or refills available) for medication(s) requested? No    Pharmacy Name: [facility] [address]    Does the patient have longterm Plus and need 100 day supply (blood pressure, diabetes and cholesterol meds only)? Patient does not have SCP

## 2024-06-01 RX ORDER — AMLODIPINE BESYLATE 10 MG/1
10 TABLET ORAL DAILY
Qty: 30 TABLET | Refills: 0 | Status: SHIPPED | OUTPATIENT
Start: 2024-06-01 | End: 2024-06-02

## 2024-06-02 DIAGNOSIS — F33.1 MODERATE EPISODE OF RECURRENT MAJOR DEPRESSIVE DISORDER (HCC): ICD-10-CM

## 2024-06-02 DIAGNOSIS — I10 PRIMARY HYPERTENSION: ICD-10-CM

## 2024-06-02 RX ORDER — AMLODIPINE BESYLATE 10 MG/1
10 TABLET ORAL DAILY
Qty: 30 TABLET | Refills: 0 | Status: SHIPPED | OUTPATIENT
Start: 2024-06-02 | End: 2024-06-28

## 2024-06-02 NOTE — TELEPHONE ENCOUNTER
Dear Mi,     Your prescription for   Requested Prescriptions     Signed Prescriptions Disp Refills    amLODIPine (NORVASC) 10 MG Tab 30 Tablet 0     Sig: TAKE 1 TABLET BY MOUTH EVERY DAY     Authorizing Provider: JAROCHO LYONS        has been sent to the   [facility]/pharmacy #[id number] - [place], [place] - [address]  [address]  [place] [address]  Phone: [phone] Fax: [phone]      Please contact your pharmacy to see when it will be ready for you to .      Thank you,     Trena Contreras, Med Ass't

## 2024-06-04 RX ORDER — DULOXETIN HYDROCHLORIDE 30 MG/1
30 CAPSULE, DELAYED RELEASE ORAL DAILY
Qty: 90 CAPSULE | Refills: 3 | Status: SHIPPED | OUTPATIENT
Start: 2024-06-04

## 2024-06-28 DIAGNOSIS — I10 PRIMARY HYPERTENSION: ICD-10-CM

## 2024-06-28 RX ORDER — AMLODIPINE BESYLATE 10 MG/1
10 TABLET ORAL DAILY
Qty: 30 TABLET | Refills: 0 | Status: SHIPPED | OUTPATIENT
Start: 2024-06-28 | End: 2024-06-30

## 2024-06-28 NOTE — TELEPHONE ENCOUNTER
Received request via: Pharmacy    Was the patient seen in the last year in this department? Yes    Does the patient have an active prescription (recently filled or refills available) for medication(s) requested? No    [facility]    Does the patient have care home Plus and need 100 day supply (blood pressure, diabetes and cholesterol meds only)? Patient does not have SCP

## 2024-06-30 DIAGNOSIS — I10 PRIMARY HYPERTENSION: ICD-10-CM

## 2024-06-30 RX ORDER — AMLODIPINE BESYLATE 10 MG/1
10 TABLET ORAL DAILY
Qty: 30 TABLET | Refills: 0 | Status: SHIPPED | OUTPATIENT
Start: 2024-06-30

## 2024-07-01 RX ORDER — AMLODIPINE BESYLATE 10 MG/1
10 TABLET ORAL DAILY
Qty: 15 TABLET | Refills: 0 | Status: SHIPPED | OUTPATIENT
Start: 2024-07-01 | End: 2024-07-07

## 2024-07-02 DIAGNOSIS — I10 PRIMARY HYPERTENSION: ICD-10-CM

## 2024-07-07 RX ORDER — AMLODIPINE BESYLATE 10 MG/1
10 TABLET ORAL DAILY
Qty: 15 TABLET | Refills: 0 | Status: SHIPPED | OUTPATIENT
Start: 2024-07-07 | End: 2024-07-24

## 2024-07-15 ENCOUNTER — HOSPITAL ENCOUNTER (OUTPATIENT)
Dept: LAB | Facility: MEDICAL CENTER | Age: 44
End: 2024-07-15
Attending: NURSE PRACTITIONER
Payer: COMMERCIAL

## 2024-07-15 DIAGNOSIS — Z13.1 SCREENING FOR DIABETES MELLITUS: ICD-10-CM

## 2024-07-15 DIAGNOSIS — Z86.2 HISTORY OF ANEMIA: ICD-10-CM

## 2024-07-15 DIAGNOSIS — F10.21 ALCOHOL USE DISORDER, MODERATE, IN EARLY REMISSION (HCC): ICD-10-CM

## 2024-07-15 DIAGNOSIS — Z13.0 SCREENING, ANEMIA, DEFICIENCY, IRON: ICD-10-CM

## 2024-07-15 DIAGNOSIS — Z11.59 NEED FOR HEPATITIS B SCREENING TEST: ICD-10-CM

## 2024-07-15 DIAGNOSIS — Z13.220 SCREENING FOR HYPERLIPIDEMIA: ICD-10-CM

## 2024-07-15 DIAGNOSIS — B18.2 CHRONIC HEPATITIS C WITHOUT HEPATIC COMA (HCC): ICD-10-CM

## 2024-07-15 DIAGNOSIS — Z13.29 SCREENING FOR THYROID DISORDER: ICD-10-CM

## 2024-07-15 DIAGNOSIS — Z13.21 ENCOUNTER FOR VITAMIN DEFICIENCY SCREENING: ICD-10-CM

## 2024-07-15 DIAGNOSIS — Z83.3 FAMILY HISTORY OF DIABETES MELLITUS: ICD-10-CM

## 2024-07-15 LAB
25(OH)D3 SERPL-MCNC: 56 NG/ML (ref 30–100)
ALBUMIN SERPL BCP-MCNC: 4.4 G/DL (ref 3.2–4.9)
ALBUMIN SERPL BCP-MCNC: 4.5 G/DL (ref 3.2–4.9)
ALBUMIN/GLOB SERPL: 1.1 G/DL
ALBUMIN/GLOB SERPL: 1.3 G/DL
ALP SERPL-CCNC: 77 U/L (ref 30–99)
ALP SERPL-CCNC: 78 U/L (ref 30–99)
ALT SERPL-CCNC: 27 U/L (ref 2–50)
ALT SERPL-CCNC: 28 U/L (ref 2–50)
ANION GAP SERPL CALC-SCNC: 12 MMOL/L (ref 7–16)
ANION GAP SERPL CALC-SCNC: 14 MMOL/L (ref 7–16)
AST SERPL-CCNC: 29 U/L (ref 12–45)
AST SERPL-CCNC: 31 U/L (ref 12–45)
BASOPHILS # BLD AUTO: 0.2 % (ref 0–1.8)
BASOPHILS # BLD AUTO: 0.3 % (ref 0–1.8)
BASOPHILS # BLD: 0.02 K/UL (ref 0–0.12)
BASOPHILS # BLD: 0.03 K/UL (ref 0–0.12)
BILIRUB SERPL-MCNC: 0.2 MG/DL (ref 0.1–1.5)
BILIRUB SERPL-MCNC: 0.2 MG/DL (ref 0.1–1.5)
BUN SERPL-MCNC: 7 MG/DL (ref 8–22)
BUN SERPL-MCNC: 7 MG/DL (ref 8–22)
CALCIUM ALBUM COR SERPL-MCNC: 9 MG/DL (ref 8.5–10.5)
CALCIUM ALBUM COR SERPL-MCNC: 9.1 MG/DL (ref 8.5–10.5)
CALCIUM SERPL-MCNC: 9.4 MG/DL (ref 8.5–10.5)
CALCIUM SERPL-MCNC: 9.4 MG/DL (ref 8.5–10.5)
CHLORIDE SERPL-SCNC: 100 MMOL/L (ref 96–112)
CHLORIDE SERPL-SCNC: 99 MMOL/L (ref 96–112)
CHOLEST SERPL-MCNC: 187 MG/DL (ref 100–199)
CO2 SERPL-SCNC: 23 MMOL/L (ref 20–33)
CO2 SERPL-SCNC: 25 MMOL/L (ref 20–33)
CREAT SERPL-MCNC: 0.79 MG/DL (ref 0.5–1.4)
CREAT SERPL-MCNC: 0.85 MG/DL (ref 0.5–1.4)
EOSINOPHIL # BLD AUTO: 0.06 K/UL (ref 0–0.51)
EOSINOPHIL # BLD AUTO: 0.06 K/UL (ref 0–0.51)
EOSINOPHIL NFR BLD: 0.6 % (ref 0–6.9)
EOSINOPHIL NFR BLD: 0.6 % (ref 0–6.9)
ERYTHROCYTE [DISTWIDTH] IN BLOOD BY AUTOMATED COUNT: 42.7 FL (ref 35.9–50)
ERYTHROCYTE [DISTWIDTH] IN BLOOD BY AUTOMATED COUNT: 43.6 FL (ref 35.9–50)
EST. AVERAGE GLUCOSE BLD GHB EST-MCNC: 103 MG/DL
GFR SERPLBLD CREATININE-BSD FMLA CKD-EPI: 87 ML/MIN/1.73 M 2
GFR SERPLBLD CREATININE-BSD FMLA CKD-EPI: 95 ML/MIN/1.73 M 2
GLOBULIN SER CALC-MCNC: 3.6 G/DL (ref 1.9–3.5)
GLOBULIN SER CALC-MCNC: 4.1 G/DL (ref 1.9–3.5)
GLUCOSE SERPL-MCNC: 90 MG/DL (ref 65–99)
GLUCOSE SERPL-MCNC: 90 MG/DL (ref 65–99)
HBA1C MFR BLD: 5.2 % (ref 4–5.6)
HCT VFR BLD AUTO: 43.8 % (ref 37–47)
HCT VFR BLD AUTO: 44 % (ref 37–47)
HDLC SERPL-MCNC: 50 MG/DL
HGB BLD-MCNC: 14.8 G/DL (ref 12–16)
HGB BLD-MCNC: 14.8 G/DL (ref 12–16)
IMM GRANULOCYTES # BLD AUTO: 0.02 K/UL (ref 0–0.11)
IMM GRANULOCYTES # BLD AUTO: 0.03 K/UL (ref 0–0.11)
IMM GRANULOCYTES NFR BLD AUTO: 0.2 % (ref 0–0.9)
IMM GRANULOCYTES NFR BLD AUTO: 0.3 % (ref 0–0.9)
LDLC SERPL CALC-MCNC: 105 MG/DL
LYMPHOCYTES # BLD AUTO: 2.69 K/UL (ref 1–4.8)
LYMPHOCYTES # BLD AUTO: 2.86 K/UL (ref 1–4.8)
LYMPHOCYTES NFR BLD: 26.7 % (ref 22–41)
LYMPHOCYTES NFR BLD: 26.9 % (ref 22–41)
MCH RBC QN AUTO: 30.8 PG (ref 27–33)
MCH RBC QN AUTO: 30.9 PG (ref 27–33)
MCHC RBC AUTO-ENTMCNC: 33.6 G/DL (ref 32.2–35.5)
MCHC RBC AUTO-ENTMCNC: 33.8 G/DL (ref 32.2–35.5)
MCV RBC AUTO: 91.3 FL (ref 81.4–97.8)
MCV RBC AUTO: 91.9 FL (ref 81.4–97.8)
MONOCYTES # BLD AUTO: 0.63 K/UL (ref 0–0.85)
MONOCYTES # BLD AUTO: 0.7 K/UL (ref 0–0.85)
MONOCYTES NFR BLD AUTO: 6.3 % (ref 0–13.4)
MONOCYTES NFR BLD AUTO: 6.5 % (ref 0–13.4)
NEUTROPHILS # BLD AUTO: 6.58 K/UL (ref 1.82–7.42)
NEUTROPHILS # BLD AUTO: 7.06 K/UL (ref 1.82–7.42)
NEUTROPHILS NFR BLD: 65.7 % (ref 44–72)
NEUTROPHILS NFR BLD: 65.7 % (ref 44–72)
NRBC # BLD AUTO: 0 K/UL
NRBC # BLD AUTO: 0 K/UL
NRBC BLD-RTO: 0 /100 WBC (ref 0–0.2)
NRBC BLD-RTO: 0 /100 WBC (ref 0–0.2)
PLATELET # BLD AUTO: 291 K/UL (ref 164–446)
PLATELET # BLD AUTO: 297 K/UL (ref 164–446)
PMV BLD AUTO: 11.3 FL (ref 9–12.9)
PMV BLD AUTO: 11.3 FL (ref 9–12.9)
POTASSIUM SERPL-SCNC: 3.8 MMOL/L (ref 3.6–5.5)
POTASSIUM SERPL-SCNC: 3.9 MMOL/L (ref 3.6–5.5)
PROT SERPL-MCNC: 8.1 G/DL (ref 6–8.2)
PROT SERPL-MCNC: 8.5 G/DL (ref 6–8.2)
RBC # BLD AUTO: 4.79 M/UL (ref 4.2–5.4)
RBC # BLD AUTO: 4.8 M/UL (ref 4.2–5.4)
SODIUM SERPL-SCNC: 136 MMOL/L (ref 135–145)
SODIUM SERPL-SCNC: 137 MMOL/L (ref 135–145)
TRIGL SERPL-MCNC: 162 MG/DL (ref 0–149)
TSH SERPL DL<=0.005 MIU/L-ACNC: 1.2 UIU/ML (ref 0.38–5.33)
WBC # BLD AUTO: 10 K/UL (ref 4.8–10.8)
WBC # BLD AUTO: 10.7 K/UL (ref 4.8–10.8)

## 2024-07-15 PROCEDURE — 87522 HEPATITIS C REVRS TRNSCRPJ: CPT

## 2024-07-15 PROCEDURE — 80061 LIPID PANEL: CPT

## 2024-07-15 PROCEDURE — 82397 CHEMILUMINESCENT ASSAY: CPT

## 2024-07-15 PROCEDURE — 36415 COLL VENOUS BLD VENIPUNCTURE: CPT

## 2024-07-15 PROCEDURE — 82306 VITAMIN D 25 HYDROXY: CPT

## 2024-07-15 PROCEDURE — 80145 DRUG ASSAY ADALIMUMAB: CPT

## 2024-07-15 PROCEDURE — 83036 HEMOGLOBIN GLYCOSYLATED A1C: CPT

## 2024-07-15 PROCEDURE — 85025 COMPLETE CBC W/AUTO DIFF WBC: CPT

## 2024-07-15 PROCEDURE — 80053 COMPREHEN METABOLIC PANEL: CPT | Mod: 91

## 2024-07-15 PROCEDURE — 80053 COMPREHEN METABOLIC PANEL: CPT

## 2024-07-15 PROCEDURE — 85025 COMPLETE CBC W/AUTO DIFF WBC: CPT | Mod: 91

## 2024-07-15 PROCEDURE — 86706 HEP B SURFACE ANTIBODY: CPT

## 2024-07-15 PROCEDURE — 84443 ASSAY THYROID STIM HORMONE: CPT

## 2024-07-16 LAB — HBV SURFACE AB SERPL IA-ACNC: <3.5 MIU/ML (ref 0–10)

## 2024-07-23 DIAGNOSIS — I10 PRIMARY HYPERTENSION: ICD-10-CM

## 2024-07-23 RX ORDER — AMLODIPINE BESYLATE 10 MG/1
10 TABLET ORAL DAILY
Qty: 90 TABLET | Refills: 1 | Status: CANCELLED | OUTPATIENT
Start: 2024-07-23

## 2024-07-24 LAB
HCV RNA SERPL NAA+PROBE-ACNC: NOT DETECTED IU/ML
HCV RNA SERPL NAA+PROBE-LOG IU: NOT DETECTED LOG IU/ML
HCV RNA SERPL QL NAA+PROBE: NOT DETECTED

## 2024-07-24 RX ORDER — AMLODIPINE BESYLATE 10 MG/1
10 TABLET ORAL DAILY
Qty: 30 TABLET | Refills: 0 | Status: SHIPPED | OUTPATIENT
Start: 2024-07-24

## 2024-07-25 LAB — ADALIMUMAB SERPL-MCNC: NORMAL UG/ML

## 2024-09-17 ENCOUNTER — APPOINTMENT (OUTPATIENT)
Dept: MEDICAL GROUP | Facility: MEDICAL CENTER | Age: 44
End: 2024-09-17
Payer: COMMERCIAL

## 2024-10-15 ENCOUNTER — APPOINTMENT (OUTPATIENT)
Dept: MEDICAL GROUP | Facility: MEDICAL CENTER | Age: 44
End: 2024-10-15
Payer: COMMERCIAL

## 2024-10-15 VITALS
WEIGHT: 145.5 LBS | HEIGHT: 60 IN | SYSTOLIC BLOOD PRESSURE: 124 MMHG | HEART RATE: 86 BPM | BODY MASS INDEX: 28.57 KG/M2 | OXYGEN SATURATION: 97 % | DIASTOLIC BLOOD PRESSURE: 86 MMHG | TEMPERATURE: 98.1 F

## 2024-10-15 DIAGNOSIS — Z12.31 ENCOUNTER FOR SCREENING MAMMOGRAM FOR MALIGNANT NEOPLASM OF BREAST: ICD-10-CM

## 2024-10-15 DIAGNOSIS — F43.12 CHRONIC POST-TRAUMATIC STRESS DISORDER (PTSD): ICD-10-CM

## 2024-10-15 DIAGNOSIS — E78.2 ELEVATED CHOLESTEROL WITH HIGH TRIGLYCERIDES: ICD-10-CM

## 2024-10-15 DIAGNOSIS — I10 PRIMARY HYPERTENSION: ICD-10-CM

## 2024-10-15 DIAGNOSIS — F33.1 MODERATE EPISODE OF RECURRENT MAJOR DEPRESSIVE DISORDER (HCC): ICD-10-CM

## 2024-10-15 PROCEDURE — 3079F DIAST BP 80-89 MM HG: CPT | Performed by: NURSE PRACTITIONER

## 2024-10-15 PROCEDURE — 3074F SYST BP LT 130 MM HG: CPT | Performed by: NURSE PRACTITIONER

## 2024-10-15 PROCEDURE — 99214 OFFICE O/P EST MOD 30 MIN: CPT | Performed by: NURSE PRACTITIONER

## 2024-10-15 RX ORDER — AMLODIPINE BESYLATE 10 MG/1
10 TABLET ORAL DAILY
Qty: 90 TABLET | Refills: 3 | Status: SHIPPED | OUTPATIENT
Start: 2024-10-15

## 2024-10-15 RX ORDER — AMLODIPINE BESYLATE 10 MG/1
10 TABLET ORAL DAILY
Qty: 30 TABLET | Refills: 0 | Status: SHIPPED | OUTPATIENT
Start: 2024-10-15 | End: 2024-10-15 | Stop reason: SDUPTHER

## 2024-10-15 RX ORDER — UPADACITINIB 45 MG/1
90 TABLET, EXTENDED RELEASE ORAL
COMMUNITY
Start: 2024-07-16

## 2024-10-15 RX ORDER — DULOXETIN HYDROCHLORIDE 30 MG/1
30 CAPSULE, DELAYED RELEASE ORAL DAILY
Qty: 90 CAPSULE | Refills: 3 | Status: SHIPPED | OUTPATIENT
Start: 2024-10-15

## 2024-10-15 ASSESSMENT — FIBROSIS 4 INDEX: FIB4 SCORE: 0.88

## 2024-12-09 ENCOUNTER — APPOINTMENT (OUTPATIENT)
Dept: MEDICAL GROUP | Facility: MEDICAL CENTER | Age: 44
End: 2024-12-09
Payer: COMMERCIAL

## 2024-12-10 ENCOUNTER — HOSPITAL ENCOUNTER (OUTPATIENT)
Facility: MEDICAL CENTER | Age: 44
End: 2024-12-10
Attending: NURSE PRACTITIONER
Payer: COMMERCIAL

## 2024-12-10 ENCOUNTER — OFFICE VISIT (OUTPATIENT)
Dept: MEDICAL GROUP | Facility: MEDICAL CENTER | Age: 44
End: 2024-12-10
Payer: COMMERCIAL

## 2024-12-10 VITALS
TEMPERATURE: 98.2 F | DIASTOLIC BLOOD PRESSURE: 86 MMHG | WEIGHT: 140.8 LBS | BODY MASS INDEX: 27.64 KG/M2 | SYSTOLIC BLOOD PRESSURE: 128 MMHG | HEART RATE: 96 BPM | OXYGEN SATURATION: 99 % | HEIGHT: 60 IN

## 2024-12-10 DIAGNOSIS — F33.1 MODERATE EPISODE OF RECURRENT MAJOR DEPRESSIVE DISORDER (HCC): ICD-10-CM

## 2024-12-10 DIAGNOSIS — F41.1 GAD (GENERALIZED ANXIETY DISORDER): ICD-10-CM

## 2024-12-10 DIAGNOSIS — K50.119 CROHN'S COLITIS, UNSPECIFIED COMPLICATION (HCC): ICD-10-CM

## 2024-12-10 DIAGNOSIS — K50.119 CROHN'S DISEASE OF COLON WITH COMPLICATION (HCC): ICD-10-CM

## 2024-12-10 PROCEDURE — 3079F DIAST BP 80-89 MM HG: CPT | Performed by: PHYSICIAN ASSISTANT

## 2024-12-10 PROCEDURE — 99214 OFFICE O/P EST MOD 30 MIN: CPT | Performed by: PHYSICIAN ASSISTANT

## 2024-12-10 PROCEDURE — 3074F SYST BP LT 130 MM HG: CPT | Performed by: PHYSICIAN ASSISTANT

## 2024-12-10 PROCEDURE — 83993 ASSAY FOR CALPROTECTIN FECAL: CPT

## 2024-12-10 RX ORDER — DULOXETIN HYDROCHLORIDE 60 MG/1
60 CAPSULE, DELAYED RELEASE ORAL DAILY
Qty: 90 CAPSULE | Refills: 2 | Status: SHIPPED | OUTPATIENT
Start: 2024-12-10

## 2024-12-10 RX ORDER — PREDNISONE 20 MG/1
TABLET ORAL
Qty: 50 TABLET | Refills: 0 | Status: SHIPPED | OUTPATIENT
Start: 2024-12-10 | End: 2025-01-19

## 2024-12-10 ASSESSMENT — FIBROSIS 4 INDEX: FIB4 SCORE: 0.88

## 2024-12-10 NOTE — PROGRESS NOTES
Subjective:     History of Present Illness  The patient presents for evaluation of Crohn's disease, depression, anxiety, and high blood pressure.    She has been experiencing a challenging period due to flare-ups of her Crohn's disease over the past few weeks. She missed approximately 1.5 weeks of her Rinvoq medication due to insurance issues but received it yesterday. Despite this, her Crohn's symptoms persist, and she does not have a scheduled appointment until 01/29/2025. The severity of her symptoms, including joint pain, which is indicative of a flare-up for her, has led to frequent absences from work. She reports vomiting at least 4 times daily but does not believe hospitalization is necessary. She has not sought emergency care and is uncertain about maintaining her stability. She does not use pain medication and prefers to avoid them. She is under the care of Dr. Bain for her Crohn's disease and has previously been treated with steroids during severe flare-ups. Her last steroid treatment was in 09/2023, prescribed by Dr. Thompson, and lasted for approximately 1.5 months. She is currently on Rinvoq, having switched from Humira, and has a follow-up appointment with Dr. Thompson on 01/29/2025 to assess her response to Rinvoq.    Her anxiety and depression are exacerbated by her current health status. She has a history of PTSD and is currently on duloxetine 30 mg daily, which she finds beneficial. She is considering increasing her Cymbalta dosage and is seeking time off work until her condition stabilizes.    For her hypertension, she takes amlodipine 10 mg daily.    MEDICATIONS  Current: Rinvoq, amlodipine, duloxetine  Past: Humira      Current medicines (including changes today)  Current Outpatient Medications   Medication Sig Dispense Refill    predniSONE (DELTASONE) 20 MG Tab Take 2 Tablets by mouth every day for 10 days, THEN 1.5 Tablets every day for 10 days, THEN 1 Tablet every day for 10 days, THEN 0.5  Tablets every day for 10 days. 50 Tablet 0    DULoxetine (CYMBALTA) 60 MG Cap DR Particles delayed-release capsule Take 1 Capsule by mouth every day. 90 Capsule 2    Upadacitinib ER (RINVOQ) 45 MG TABLET SR 24 HR 90 Tablets.      amLODIPine (NORVASC) 10 MG Tab Take 1 Tablet by mouth every day. 90 Tablet 3    vitamin D3 (CHOLECALCIFEROL) 1000 Unit (25 mcg) Tab Take 1,000 Units by mouth every day.      cyanocobalamin (VITAMIN B-12) 500 MCG Tab Take 500 mcg by mouth every day.      therapeutic multivitamin-minerals (THERAGRAN-M) Tab Take 1 Tablet by mouth every day.      Zinc 50 MG Tab Take 50 mg by mouth every day.      magnesium oxide (MAG-OX) 400 MG Tab tablet Take 400 mg by mouth every day.      Probiotic Product (PROBIOTIC DAILY) Cap Take 1 Capsule by mouth every day.      ascorbic acid (ASCORBIC ACID) 500 MG Tab Take 1 Tablet by mouth every day. 30 Tablet 0     No current facility-administered medications for this visit.     She  has a past medical history of Alcohol use disorder, moderate, in early remission ([facility]) (11/07/2016), Anxiety, Chronic post-traumatic stress disorder (PTSD) (11/07/2016), Crohn's disease ([facility]), Depression, Hepatitis C, History of alcohol use disorder (11/29/2023), History of anemia (10/10/2016), and History of opioid abuse ([facility]) (11/29/2023).    ROS   No chest pain, no shortness of breath, no abdominal pain  Positive ROS as per HPI.  All other systems reviewed and are negative.     Objective:     /86   Pulse 96   Temp 36.8 °C (98.2 °F) (Temporal)   Ht 1.524 m (5')   Wt 63.9 kg (140 lb 12.8 oz)   SpO2 99%  Body mass index is 27.5 kg/m².   Physical Exam    Constitutional: Alert, no distress.  Skin: Warm, dry, good turgor, no rashes in visible areas.  Eye: Equal, round and reactive, conjunctiva clear, lids normal.  ENMT: Lips without lesions, good dentition, oropharynx clear.  Neck: Trachea midline, no masses, no thyromegaly. No cervical or supraclavicular  lymphadenopathy  Respiratory: Unlabored respiratory effort, lungs clear to auscultation, no wheezes, no ronchi.  Cardiovascular: Normal S1, S2, no murmur, no edema.  Abdomen: Soft, non-tender, no masses, no hepatosplenomegaly.  Psych: Alert and oriented x3, normal affect and mood.      Results          Assessment and Plan:   The following treatment plan was discussed    Assessment & Plan  1. Crohn's disease.  She has been experiencing flare-ups and missed 1.5 weeks of Rinvoq due to insurance issues. She reports severe joint pain and vomiting 4 times a day. A prescription for a high-dose prednisone taper has been issued, starting with 40 mg and tapering down to 5 mg. She is advised to continue her Rinvoq treatment. Laboratory tests have been ordered to assess electrolyte levels and white blood cell count, which should be conducted prior to initiating steroid therapy. If symptoms worsen, she may need to go to the ER or get a CAT scan of the abdomen.    2. Depression.  She is currently on duloxetine 30 mg daily. The dosage of duloxetine will be increased to 60 mg daily to help manage her depression and anxiety. A prescription for duloxetine 60 mg has been sent to the pharmacy. She is advised to monitor her symptoms and adjust the dosage as needed after 6 weeks to 3 months.    3. Anxiety.  Her anxiety is currently exacerbated due to missing work and feeling unwell. The increase in duloxetine to 60 mg daily is also aimed at managing her generalized anxiety.    4. Hypertension.  Her blood pressure is well controlled with her current medication regimen of amlodipine 10 mg daily. No changes are needed at this time.    Follow-up  The patient will follow up in 1 to 2 weeks.      ORDERS:  1. Crohn's disease of colon with complication (HCC)    - predniSONE (DELTASONE) 20 MG Tab; Take 2 Tablets by mouth every day for 10 days, THEN 1.5 Tablets every day for 10 days, THEN 1 Tablet every day for 10 days, THEN 0.5 Tablets every day  for 10 days.  Dispense: 50 Tablet; Refill: 0  - CBC WITH DIFFERENTIAL; Future  - Comp Metabolic Panel; Future    2. Moderate episode of recurrent major depressive disorder (HCC)    - DULoxetine (CYMBALTA) 60 MG Cap DR Particles delayed-release capsule; Take 1 Capsule by mouth every day.  Dispense: 90 Capsule; Refill: 2    3. Crohn's colitis, unspecified complication (HCC)    - predniSONE (DELTASONE) 20 MG Tab; Take 2 Tablets by mouth every day for 10 days, THEN 1.5 Tablets every day for 10 days, THEN 1 Tablet every day for 10 days, THEN 0.5 Tablets every day for 10 days.  Dispense: 50 Tablet; Refill: 0  - CBC WITH DIFFERENTIAL; Future  - Comp Metabolic Panel; Future    4. BRISA (generalized anxiety disorder)    - DULoxetine (CYMBALTA) 60 MG Cap DR Particles delayed-release capsule; Take 1 Capsule by mouth every day.  Dispense: 90 Capsule; Refill: 2          Follow Up: 4 weeks    Please note that this dictation was created using voice recognition software. I have made every reasonable attempt to correct obvious errors, but I expect that there are errors of grammar and possibly content that I did not discover before finalizing the note.      Attestation      Verbal consent was acquired by the patient to use [employer] ambient listening note generation during this visit Yes

## 2024-12-11 ENCOUNTER — HOSPITAL ENCOUNTER (OUTPATIENT)
Facility: MEDICAL CENTER | Age: 44
End: 2024-12-11
Attending: NURSE PRACTITIONER
Payer: COMMERCIAL

## 2024-12-11 LAB — LACTOFERRIN STL QL IA: POSITIVE

## 2024-12-11 PROCEDURE — 83630 LACTOFERRIN FECAL (QUAL): CPT

## 2024-12-16 LAB — CALPROTECTIN STL-MCNT: 105 UG/G

## 2024-12-19 ENCOUNTER — OFFICE VISIT (OUTPATIENT)
Dept: MEDICAL GROUP | Facility: MEDICAL CENTER | Age: 44
End: 2024-12-19
Payer: COMMERCIAL

## 2024-12-19 VITALS
SYSTOLIC BLOOD PRESSURE: 138 MMHG | HEART RATE: 93 BPM | BODY MASS INDEX: 28.53 KG/M2 | WEIGHT: 145.3 LBS | TEMPERATURE: 99.2 F | OXYGEN SATURATION: 99 % | DIASTOLIC BLOOD PRESSURE: 90 MMHG | HEIGHT: 60 IN

## 2024-12-19 DIAGNOSIS — K50.119 CROHN'S DISEASE OF COLON WITH COMPLICATION (HCC): ICD-10-CM

## 2024-12-19 DIAGNOSIS — F43.12 CHRONIC POST-TRAUMATIC STRESS DISORDER (PTSD): ICD-10-CM

## 2024-12-19 DIAGNOSIS — K50.119 CROHN'S COLITIS, UNSPECIFIED COMPLICATION (HCC): ICD-10-CM

## 2024-12-19 DIAGNOSIS — F33.1 MODERATE EPISODE OF RECURRENT MAJOR DEPRESSIVE DISORDER (HCC): ICD-10-CM

## 2024-12-19 DIAGNOSIS — F41.1 GAD (GENERALIZED ANXIETY DISORDER): ICD-10-CM

## 2024-12-19 PROCEDURE — 3075F SYST BP GE 130 - 139MM HG: CPT | Performed by: NURSE PRACTITIONER

## 2024-12-19 PROCEDURE — 3080F DIAST BP >= 90 MM HG: CPT | Performed by: NURSE PRACTITIONER

## 2024-12-19 PROCEDURE — 99214 OFFICE O/P EST MOD 30 MIN: CPT | Performed by: NURSE PRACTITIONER

## 2024-12-19 ASSESSMENT — FIBROSIS 4 INDEX: FIB4 SCORE: 0.88
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
## 2025-01-07 ENCOUNTER — HOSPITAL ENCOUNTER (OUTPATIENT)
Dept: LAB | Facility: MEDICAL CENTER | Age: 45
End: 2025-01-07
Attending: PHYSICIAN ASSISTANT
Payer: COMMERCIAL

## 2025-01-07 DIAGNOSIS — K50.119 CROHN'S DISEASE OF COLON WITH COMPLICATION (HCC): ICD-10-CM

## 2025-01-07 DIAGNOSIS — K50.119 CROHN'S COLITIS, UNSPECIFIED COMPLICATION (HCC): ICD-10-CM

## 2025-01-07 LAB
ALBUMIN SERPL BCP-MCNC: 5.1 G/DL (ref 3.2–4.9)
ALBUMIN/GLOB SERPL: 1.5 G/DL
ALP SERPL-CCNC: 61 U/L (ref 30–99)
ALT SERPL-CCNC: 26 U/L (ref 2–50)
ANION GAP SERPL CALC-SCNC: 13 MMOL/L (ref 7–16)
AST SERPL-CCNC: 35 U/L (ref 12–45)
BASOPHILS # BLD AUTO: 0.3 % (ref 0–1.8)
BASOPHILS # BLD: 0.02 K/UL (ref 0–0.12)
BILIRUB SERPL-MCNC: 0.2 MG/DL (ref 0.1–1.5)
BUN SERPL-MCNC: 11 MG/DL (ref 8–22)
CALCIUM ALBUM COR SERPL-MCNC: 9.4 MG/DL (ref 8.5–10.5)
CALCIUM SERPL-MCNC: 10.3 MG/DL (ref 8.5–10.5)
CHLORIDE SERPL-SCNC: 103 MMOL/L (ref 96–112)
CO2 SERPL-SCNC: 24 MMOL/L (ref 20–33)
CREAT SERPL-MCNC: 0.82 MG/DL (ref 0.5–1.4)
EOSINOPHIL # BLD AUTO: 0.03 K/UL (ref 0–0.51)
EOSINOPHIL NFR BLD: 0.5 % (ref 0–6.9)
ERYTHROCYTE [DISTWIDTH] IN BLOOD BY AUTOMATED COUNT: 47.4 FL (ref 35.9–50)
GFR SERPLBLD CREATININE-BSD FMLA CKD-EPI: 90 ML/MIN/1.73 M 2
GLOBULIN SER CALC-MCNC: 3.3 G/DL (ref 1.9–3.5)
GLUCOSE SERPL-MCNC: 104 MG/DL (ref 65–99)
HCT VFR BLD AUTO: 37.2 % (ref 37–47)
HGB BLD-MCNC: 12.4 G/DL (ref 12–16)
IMM GRANULOCYTES # BLD AUTO: 0 K/UL (ref 0–0.11)
IMM GRANULOCYTES NFR BLD AUTO: 0 % (ref 0–0.9)
LYMPHOCYTES # BLD AUTO: 3.15 K/UL (ref 1–4.8)
LYMPHOCYTES NFR BLD: 51.3 % (ref 22–41)
MCH RBC QN AUTO: 31.7 PG (ref 27–33)
MCHC RBC AUTO-ENTMCNC: 33.3 G/DL (ref 32.2–35.5)
MCV RBC AUTO: 95.1 FL (ref 81.4–97.8)
MONOCYTES # BLD AUTO: 0.49 K/UL (ref 0–0.85)
MONOCYTES NFR BLD AUTO: 8 % (ref 0–13.4)
NEUTROPHILS # BLD AUTO: 2.45 K/UL (ref 1.82–7.42)
NEUTROPHILS NFR BLD: 39.9 % (ref 44–72)
NRBC # BLD AUTO: 0 K/UL
NRBC BLD-RTO: 0 /100 WBC (ref 0–0.2)
PLATELET # BLD AUTO: 286 K/UL (ref 164–446)
PMV BLD AUTO: 11.4 FL (ref 9–12.9)
POTASSIUM SERPL-SCNC: 4.3 MMOL/L (ref 3.6–5.5)
PROT SERPL-MCNC: 8.4 G/DL (ref 6–8.2)
RBC # BLD AUTO: 3.91 M/UL (ref 4.2–5.4)
SODIUM SERPL-SCNC: 140 MMOL/L (ref 135–145)
WBC # BLD AUTO: 6.1 K/UL (ref 4.8–10.8)

## 2025-01-07 PROCEDURE — 80053 COMPREHEN METABOLIC PANEL: CPT

## 2025-01-07 PROCEDURE — 85025 COMPLETE CBC W/AUTO DIFF WBC: CPT

## 2025-01-07 PROCEDURE — 36415 COLL VENOUS BLD VENIPUNCTURE: CPT

## 2025-01-08 ENCOUNTER — APPOINTMENT (OUTPATIENT)
Dept: MEDICAL GROUP | Facility: MEDICAL CENTER | Age: 45
End: 2025-01-08
Payer: COMMERCIAL

## 2025-01-08 VITALS
WEIGHT: 144 LBS | BODY MASS INDEX: 28.12 KG/M2 | SYSTOLIC BLOOD PRESSURE: 104 MMHG | TEMPERATURE: 97 F | OXYGEN SATURATION: 96 % | DIASTOLIC BLOOD PRESSURE: 60 MMHG | HEART RATE: 81 BPM

## 2025-01-08 DIAGNOSIS — F33.1 MODERATE EPISODE OF RECURRENT MAJOR DEPRESSIVE DISORDER (HCC): ICD-10-CM

## 2025-01-08 DIAGNOSIS — K50.119 CROHN'S DISEASE OF COLON WITH COMPLICATION (HCC): ICD-10-CM

## 2025-01-08 PROCEDURE — 3074F SYST BP LT 130 MM HG: CPT | Performed by: NURSE PRACTITIONER

## 2025-01-08 PROCEDURE — 3078F DIAST BP <80 MM HG: CPT | Performed by: NURSE PRACTITIONER

## 2025-01-08 PROCEDURE — 99214 OFFICE O/P EST MOD 30 MIN: CPT | Performed by: NURSE PRACTITIONER

## 2025-01-08 ASSESSMENT — FIBROSIS 4 INDEX: FIB4 SCORE: 1.06

## 2025-01-09 NOTE — PROGRESS NOTES
Subjective:     History of Present Illness  The patient is a 44-year-old female seen in follow-up for depression.    She reports an improvement in her physical well-being, with increased sleep duration. She is currently on the increased dose of Cymbalta 60 mg, which she believes is beneficial for her. She has not yet initiated counseling sessions as she has not received any communication from the counselor.  Gave pt referral information.    She does not experience diarrhea or black, tarry stools but notes excessive gas. She recalls a recent episode of prolonged time spent in the bathroom due to uncertainty about whether the discomfort was due to gas or a bowel movement. She reports no presence of blood in her stool. Her appetite remains satisfactory, and she has been incorporating bone broth into diet, although she expresses concern about potential blood pressure elevation. She has a scheduled appointment with a gastroenterologist on 01/29/2024.  She completed a regimen of prednisone.      MEDICATIONS  Current: Prednisone, Cymbalta    Results  - Laboratory Studies:    - GFR: 90    - CMP: wnl except alb & protein sl elevated    - CBC: wnl except neutrophils dec and lymphs elevated, r/t her crohns flare      Current medicines (including changes today)  Current Outpatient Medications   Medication Sig Dispense Refill    predniSONE (DELTASONE) 20 MG Tab Take 2 Tablets by mouth every day for 10 days, THEN 1.5 Tablets every day for 10 days, THEN 1 Tablet every day for 10 days, THEN 0.5 Tablets every day for 10 days. 50 Tablet 0    DULoxetine (CYMBALTA) 60 MG Cap DR Particles delayed-release capsule Take 1 Capsule by mouth every day. 90 Capsule 2    Upadacitinib ER (RINVOQ) 45 MG TABLET SR 24 HR 90 Tablets.      amLODIPine (NORVASC) 10 MG Tab Take 1 Tablet by mouth every day. 90 Tablet 3    ascorbic acid (ASCORBIC ACID) 500 MG Tab Take 1 Tablet by mouth every day. 30 Tablet 0    vitamin D3 (CHOLECALCIFEROL) 1000 Unit (25  mcg) Tab Take 1,000 Units by mouth every day.      cyanocobalamin (VITAMIN B-12) 500 MCG Tab Take 500 mcg by mouth every day.      therapeutic multivitamin-minerals (THERAGRAN-M) Tab Take 1 Tablet by mouth every day.      Zinc 50 MG Tab Take 50 mg by mouth every day.      magnesium oxide (MAG-OX) 400 MG Tab tablet Take 400 mg by mouth every day.      Probiotic Product (PROBIOTIC DAILY) Cap Take 1 Capsule by mouth every day.       No current facility-administered medications for this visit.     She  has a past medical history of Alcohol use disorder, moderate, in early remission ([facility]) (11/07/2016), Anxiety, Chronic post-traumatic stress disorder (PTSD) (11/07/2016), Crohn's disease ([facility]), Depression, Hepatitis C, History of alcohol use disorder (11/29/2023), History of anemia (10/10/2016), and History of opioid abuse ([facility]) (11/29/2023).      Complete Blood Count:  Lab Results   Component Value Date/Time    WBC 6.1 01/07/2025 1605    RBC 3.91 (L) 01/07/2025 1605    HEMOGLOBIN 12.4 01/07/2025 1605    HEMATOCRIT 37.2 01/07/2025 1605    MCV 95.1 01/07/2025 1605    MCH 31.7 01/07/2025 1605    MCHC 33.3 01/07/2025 1605    RDW 47.4 01/07/2025 1605    MPV 11.4 01/07/2025 1605    LYMPHOCYTES 51.30 (H) 01/07/2025 1605    LYMPHS 3.15 01/07/2025 1605    MONOCYTES 8.00 01/07/2025 1605    MONOS 0.49 01/07/2025 1605    EOSINOPHILS 0.50 01/07/2025 1605    EOS 0.03 01/07/2025 1605    BASOPHILS 0.30 01/07/2025 1605    BASO 0.02 01/07/2025 1605    NRBC 0.00 01/07/2025 1605       Complete Metabolic Panel:  Lab Results   Component Value Date/Time    SODIUM 140 01/07/2025 04:05 PM    POTASSIUM 4.3 01/07/2025 04:05 PM    CHLORIDE 103 01/07/2025 04:05 PM    CO2 24 01/07/2025 04:05 PM    ANION 13.0 01/07/2025 04:05 PM    GLUCOSE 104 (H) 01/07/2025 04:05 PM    BUN 11 01/07/2025 04:05 PM    CREATININE 0.82 01/07/2025 04:05 PM    CREATININE 0.6 01/09/2007 05:15 AM    ASTSGOT 35 01/07/2025 04:05 PM    ALTSGPT 26 01/07/2025 04:05 PM    TBILIRUBIN  0.2 01/07/2025 04:05 PM    ALBUMIN 5.1 (H) 01/07/2025 04:05 PM    TOTPROTEIN 8.4 (H) 01/07/2025 04:05 PM    GLOBULIN 3.3 01/07/2025 04:05 PM    AGRATIO 1.5 01/07/2025 04:05 PM       GFR:    Lab Results   Component Value Date/Time    GFRCKD 90 01/07/2025 1605       ROS   Review of Systems   Constitutional: Negative.  Negative for fever, chills, weight loss, malaise/fatigue and diaphoresis.   HENT: Negative.  Negative for hearing loss, ear pain, nosebleeds, congestion, sore throat, neck pain, tinnitus and ear discharge.    Respiratory: Negative.  Negative for cough, hemoptysis, sputum production, shortness of breath, wheezing and stridor.    Cardiovascular: Negative.  Negative for chest pain, palpitations, orthopnea, claudication, leg swelling and PND.   Gastrointestinal: denies nausea, vomiting, constipation, heartburn, melena or hematochezia.  Genitourinary: Denies dysuria, hematuria, urinary incontinence, frequency or urgency.    Musculoskeletal: Negative.  Negative for myalgias and back pain.   Neurological: Negative.  Negative for dizziness, tingling, tremors, weakness and headaches.   Psych:  Denies anxiety or insomnia.  All other systems reviewed and are negative.         Objective:     /60   Pulse 81   Temp 36.1 °C (97 °F) (Temporal)   Wt 65.3 kg (144 lb)   SpO2 96%  Body mass index is 28.12 kg/m².   Physical Exam  Lungs were auscultated.  Heart was examined.  Abdomen was examined.  Physical Exam   Vitals reviewed.  Constitutional: oriented to person, place, and time. appears well-developed and well-nourished. No distress.   Neck: No JVD present.  Cardiovascular: Normal rate, regular rhythm, normal heart sounds and intact distal pulses.  Exam reveals no gallop and no friction rub.  No murmur heard.  No carotid bruits.   Pulmonary/Chest: Effort normal and breath sounds normal. No stridor. No respiratory distress. no wheezes or rales. exhibits no tenderness.   Musculoskeletal: Normal range of motion.  exhibits no edema. dung pedal pulses 2+.  Lymphadenopathy: no cervical or supraclavicular adenopathy.   Abd:  No CVAT,  Soft,  Bs noted in all quadrants.  No HSM.  Mild suprapubic abdominal tenderness.  Neurological: alert and oriented to person, place, and time. exhibits normal muscle tone. Coordination normal.   Skin: Skin is warm and dry. no diaphoresis.   Psychiatric: normal mood and affect. behavior is normal.     Assessment and Plan:   The following treatment plan was discussed  Assessment & Plan  1. Depression.  Her condition appears to be improving with the current treatment regimen of Cymbalta 60 mg. She reports better sleep and reduced guilt about resting. She has been provided with contact information for behavioral health services at [address], [place], and is encouraged to follow up with them.    2. Crohns flare  She reports no diarrhea or black, tarry stools but mentions occasional gas and prolonged time on the toilet without significant discomfort or blood in the stool. She has a scheduled appointment with a gastroenterologist on 01/29/2025. She is advised to continue monitoring her symptoms and follow up with the gastroenterologist as planned.    3. Weight management.  She has lost a pound since her last visit. She is advised to incorporate more protein into her diet to prevent further weight loss. Protein shakes and other sources of protein are recommended.  she is advised to maintain adequate hydration and follow a balanced diet.      4. Elevated blood pressure.  Her blood pressure was high during her visits in December 2024 but is currently low. She is advised to continue her current regimen and monitor her blood pressure regularly.      Follow-up  The patient will follow up in 6 months.      ORDERS:  1. Crohn's disease of colon with complication (HCC)      2. Moderate episode of recurrent major depressive disorder (HCC)        Please note that this dictation was created using voice recognition  software. I have made every reasonable attempt to correct obvious errors, but I expect that there are errors of grammar and possibly content that I did not discover before finalizing the note.      Attestation      Verbal consent was acquired by the patient to use [employer] ambient listening note generation during this visit Yes

## 2025-01-20 ENCOUNTER — TELEMEDICINE (OUTPATIENT)
Dept: MEDICAL GROUP | Facility: MEDICAL CENTER | Age: 45
End: 2025-01-20
Payer: COMMERCIAL

## 2025-01-20 VITALS — WEIGHT: 144 LBS | HEIGHT: 60 IN | BODY MASS INDEX: 28.27 KG/M2

## 2025-01-20 DIAGNOSIS — F33.1 MODERATE EPISODE OF RECURRENT MAJOR DEPRESSIVE DISORDER (HCC): ICD-10-CM

## 2025-01-20 DIAGNOSIS — K50.119 CROHN'S DISEASE OF COLON WITH COMPLICATION (HCC): ICD-10-CM

## 2025-01-20 DIAGNOSIS — F41.1 GAD (GENERALIZED ANXIETY DISORDER): ICD-10-CM

## 2025-01-20 PROCEDURE — 99214 OFFICE O/P EST MOD 30 MIN: CPT | Mod: 95 | Performed by: NURSE PRACTITIONER

## 2025-01-20 RX ORDER — BUPROPION HYDROCHLORIDE 75 MG/1
75 TABLET ORAL 2 TIMES DAILY
Qty: 60 TABLET | Refills: 1 | Status: SHIPPED | OUTPATIENT
Start: 2025-01-20

## 2025-01-20 ASSESSMENT — FIBROSIS 4 INDEX: FIB4 SCORE: 1.06

## 2025-01-20 NOTE — PROGRESS NOTES
Virtual Visit: Established Patient   This visit was conducted via Teams using secure and encrypted videoconferencing technology.   The patient was in their home in the [facility].    The patient's identity was confirmed and verbal consent was obtained for this virtual visit.    Subjective:   CC:   depression    [place] is a 44 y.o. female presenting for evaluation and management of:  depression.      She is feeling better but her pain is worse and she is having more depression d/t the pain.  She has appt w/psychiatrist for counseling.  She feels that she would like to add on wellbutrin.  A friend told her about it.  She is taking the cymbalta 60 mg approp and it is helping her sx some.  She is taking prednisone and that is making her anxiety and depression worse.  The  prednisone is only helping some for the pain. She also has appt with GI in 1/29/25.  Will see psychiatry 2/25/25.  Counselor is not scheduled yet.   She would like to extend her CAITLYN until after she sees the psychiatrist.  She is having joint pain that she r/t arthritis.  She is also having the chronic abd pain but that is stable.  Limiting her diet to help control sx.          ROS   Review of Systems   Constitutional: Negative.  Negative for fever, chills, weight loss, malaise/fatigue and diaphoresis.   HENT: Negative.  Negative for hearing loss, ear pain, nosebleeds, congestion, sore throat, neck pain, tinnitus and ear discharge.    Respiratory: Negative.  Negative for cough, hemoptysis, sputum production, shortness of breath, wheezing and stridor.    Cardiovascular: Negative.  Negative for chest pain, palpitations, orthopnea, claudication, leg swelling and PND.   Gastrointestinal: denies constipation, heartburn, melena or hematochezia.  Genitourinary: Denies dysuria, hematuria, urinary incontinence, frequency or urgency.    Musculoskeletal: Negative.  Negative for myalgias and back pain.  + arthralgias   Neurological: Negative.   Negative for dizziness, tingling, tremors, weakness and headaches.   All other systems reviewed and are negative.      Current medicines (including changes today)  Current Outpatient Medications   Medication Sig Dispense Refill    DULoxetine (CYMBALTA) 60 MG Cap DR Particles delayed-release capsule Take 1 Capsule by mouth every day. 90 Capsule 2    Upadacitinib ER (RINVOQ) 45 MG TABLET SR 24 HR 90 Tablets.      amLODIPine (NORVASC) 10 MG Tab Take 1 Tablet by mouth every day. 90 Tablet 3    ascorbic acid (ASCORBIC ACID) 500 MG Tab Take 1 Tablet by mouth every day. 30 Tablet 0    vitamin D3 (CHOLECALCIFEROL) 1000 Unit (25 mcg) Tab Take 1,000 Units by mouth every day.      cyanocobalamin (VITAMIN B-12) 500 MCG Tab Take 500 mcg by mouth every day.      therapeutic multivitamin-minerals (THERAGRAN-M) Tab Take 1 Tablet by mouth every day.      Zinc 50 MG Tab Take 50 mg by mouth every day.      magnesium oxide (MAG-OX) 400 MG Tab tablet Take 400 mg by mouth every day.      Probiotic Product (PROBIOTIC DAILY) Cap Take 1 Capsule by mouth every day.       No current facility-administered medications for this visit.       Patient Active Problem List    Diagnosis Date Noted    History of alcohol use disorder 11/29/2023    History of opioid abuse (HCC) 11/29/2023    Chronic post-traumatic stress disorder (PTSD) 11/07/2016    BRISA (generalized anxiety disorder) 11/07/2016    History of anemia 10/10/2016    Major depressive disorder, recurrent, severe without psychotic features (HCC) 09/30/2016    Crohn's disease of colon with complication (HCC) 07/26/2016    Chronic hepatitis C without hepatic coma (HCC) 05/09/2016        Objective:   Ht 1.524 m (5')   Wt 65.3 kg (144 lb)   BMI 28.12 kg/m²     Physical Exam:  Constitutional: Alert, no distress, well-groomed.  Skin: No rashes in visible areas.  Eye: Round. Conjunctiva clear, lids normal. No icterus.   ENMT: Lips pink without lesions, good dentition, moist mucous membranes.  Phonation normal.  Neck: No masses, no thyromegaly. Moves freely without pain.  Respiratory: Unlabored respiratory effort, no cough or audible wheeze  Psych: Alert and oriented x3, normal affect and mood.     Assessment and Plan:   The following treatment plan was discussed:   1. Crohn's disease of colon with complication (HCC)      f/u with Devan on 1/29/25 for crohn's worsening.  discuss amt of time to be ooff work. let me know if he recommends extending CAITLYN      2. Moderate episode of recurrent major depressive disorder (HCC)      sx not well controlled on cymbalta 60 mg dialy.  add wellbutrin 75 mg bid.  f/u if sx not improved on med.      3. BRISA (generalized anxiety disorder)      f/u with psychiatrist on 2/25 as sched              Follow-up: Return if symptoms worsen or fail to improve.

## 2025-01-23 ENCOUNTER — APPOINTMENT (OUTPATIENT)
Dept: MEDICAL GROUP | Facility: MEDICAL CENTER | Age: 45
End: 2025-01-23
Payer: COMMERCIAL

## 2025-02-11 NOTE — CARE PLAN
The patient is Stable - Low risk of patient condition declining or worsening    Shift Goals  Clinical Goals: Fluids, N/V mgmt, pain mgmt  Patient Goals: rest, recovery  Family Goals: ELVA    Progress made toward(s) clinical / shift goals:  pt will be free of injuries this shift, pt will not have any nausea this shift, pt will have pain < 4 this shift    Patient is not progressing towards the following goals: pt pain at 6-8 despite pharmacological and non-pharmacological interventions    Problem: Pain - Standard  Goal: Alleviation of pain or a reduction in pain to the patient’s comfort goal  Outcome: Not Met     
The patient is Stable - Low risk of patient condition declining or worsening    Shift Goals  Clinical Goals: labs WDL  Patient Goals: rest; have procedures; eat  Family Goals: maureen    Progress made toward(s) clinical / shift goals:  medicating per mar, repeat labs ordered and monitored.     Patient is not progressing towards the following goals:      
The patient is Stable - Low risk of patient condition declining or worsening    Shift Goals  Clinical Goals: monitor abnormal labs throughout shift  Patient Goals: rest; have procedures; eat  Family Goals: maureen    Progress made toward(s) clinical / shift goals:      Patient is not progressing towards the following goals:      Problem: Knowledge Deficit - Standard  Goal: Patient and family/care givers will demonstrate understanding of plan of care, disease process/condition, diagnostic tests and medications  Outcome: Not Progressing     
The patient is Stable - Low risk of patient condition declining or worsening    Shift Goals  Clinical Goals: nausea management, pain management  Patient Goals: rest, comfort  Family Goals: maureen    Progress made toward(s) clinical / shift goals:  Educated patient on pain rating scales, frequent pain assessments in place, medicating per MAR, non pharmaceutical pain relief such as heat pads and positioning in use.        Patient is not progressing towards the following goals:      
The patient is Watcher - Medium risk of patient condition declining or worsening    Shift Goals  Clinical Goals: nausea management, pain management  Patient Goals: rest, comfort  Family Goals: maureen    Progress made toward(s) clinical / shift goals:  patient is a&o x4. Oxygen saturations maintained on room air. Patient reported no pain as of yet and has been sleeping. No reports of nausea or vomiting. Patient belongings and call light at bedside. Bed in low position. Treaded socks on patient. All needs addressed at this time.    Patient is not progressing towards the following goals:      
(3) no apparent problem

## 2025-02-17 DIAGNOSIS — F41.1 GAD (GENERALIZED ANXIETY DISORDER): ICD-10-CM

## 2025-02-17 DIAGNOSIS — K50.119 CROHN'S DISEASE OF COLON WITH COMPLICATION (HCC): ICD-10-CM

## 2025-02-17 DIAGNOSIS — F33.1 MODERATE EPISODE OF RECURRENT MAJOR DEPRESSIVE DISORDER (HCC): ICD-10-CM

## 2025-02-17 RX ORDER — BUPROPION HYDROCHLORIDE 75 MG/1
75 TABLET ORAL 2 TIMES DAILY
Qty: 180 TABLET | Refills: 1 | Status: SHIPPED | OUTPATIENT
Start: 2025-02-17 | End: 2025-02-25

## 2025-02-25 ENCOUNTER — OFFICE VISIT (OUTPATIENT)
Dept: BEHAVIORAL HEALTH | Facility: CLINIC | Age: 45
End: 2025-02-25
Payer: COMMERCIAL

## 2025-02-25 DIAGNOSIS — F43.12 CHRONIC POST-TRAUMATIC STRESS DISORDER (PTSD): ICD-10-CM

## 2025-02-25 DIAGNOSIS — F41.1 GENERALIZED ANXIETY DISORDER: ICD-10-CM

## 2025-02-25 DIAGNOSIS — F33.2 MAJOR DEPRESSIVE DISORDER, RECURRENT, SEVERE WITHOUT PSYCHOTIC FEATURES (HCC): ICD-10-CM

## 2025-02-25 RX ORDER — BUPROPION HYDROCHLORIDE 150 MG/1
150 TABLET ORAL EVERY MORNING
Qty: 90 TABLET | Refills: 0 | Status: SHIPPED | OUTPATIENT
Start: 2025-02-25 | End: 2025-05-26

## 2025-02-25 NOTE — PROGRESS NOTES
"PSYCHIATRIC EVALUATION    Evaluation completed by: Tisha Kendall M.D.   Date of Service: 02/25/2025  Appointment type: in-office appointment.  Attending: Melo Stout MD  Information below was collected from: patient    CHIEF COMPLIANT:  Crohn's disease and depression, anxiety, PTSD    HPI:   Mi Bay is a 44 y.o. old female who presents today for new psychiatric evaluation for the assessment of depression, anxiety. 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 She reports that she has tried to establish care with therapist but cannot be seen for another few months. Discussed [email or web address] resource to find a therapist who is a possibly also a person of color/queer.     Patient has been prescribed duloxetine 60mg (started for 2 months in 2024 and increased to 60mg in January 2025) and Wellbutrin IR 75mg BID (started around 3 weeks ago, only taking once daily). Patient reports that duloxetine has helped improved mood slightly with less feelings of depression. Wellbutrin has helped to improve motivation and focus and she has been able to organize her life a bit better. She feels guilty about being 44 and not being a \"real adult\" in society's standards. She reports due to childhood trauma she has a lot of negative thoughts of herself and constantly talking poorly about herself.  She has a pet dog that she takes care of which reminds her to drink water, take showers. She reports most days she wakes up in pain or is rushing to be on time. Patient reports poor sleep, not sleeping " "restfully due to racing thoughts. She worries about anything and everything. Patient denies any suicidal ideations or behaviors currently or in the past.     Patient reports that she had previously been involved in \"hustle culture\" in her early 20s and lived a very fast-paced life about chasing after money and career success. She was using substances heavily mostly alcohol, cocaine, marijuana, pain medications. She started drinking at age 13-14 and had feelings of depression and SI at the time due to being queer growing up. She reports that she drank alcohol heavily from 4584-6251 and been to rehab multiple times. After completing residential treatment program after family intervention, she has been abstinent from alcohol and substances since 2017. Patient reports that she was psychiatrically hospitalized 3 times during this period, for episodes of acting erratically, not sleeping, spending money, inviting random strangers over. She reports that she has not had an episode like this since 2017 when she stopped using substances.     She is currently following with Hina CHAO (PCP) and Dr. Devries and his associates (Crohn's doctor).         PSYCHIATRIC REVIEW OF SYSTEMS: current symptoms as reported by pt.  Depression: Depressed mood, Difficulty sleeping, Excessive feelings of guilt, Low energy, Low appetite, and Difficulty concentrating  Yadi: Decreased need for sleep  Anxiety/Panic Attacks: Denies any anxiety associated symptoms, insomnia, racing thoughts, feelings of losing control, difficulty concentrating  Trauma: negative beliefs of self/others/world, irritable, hypervigilance, and increased startle response  Psychosis: Patient reports no signs or symptoms indicative of psychosis  ADHD: fails to give close attention to details or makes careless mistakes in school, has difficulty sustaining attention in tasks or play activities, has difficulty organizing tasks and activities, does not follow through on " instructions and fails to finish schoolwork, loses things that are necessary for tasks and activities, is easily distracted by extraneous stimuli, is often forgetful in daily activities, avoids engaging in tasks that require sustained attention    REVIEW OF SYSTEMS   ROS    PAST PSYCHIATRIC HISTORY  Inpatient Psychiatric Hospitalizations:  2014, 2016, 2017 (due to possible manic episodes, all in [place], brought in by family members  Outpatient Psychiatric Care: denies any therapist currently  Previous: therapist off and on since [id number]  Psychiatric Medications: duloxetine 60mg BID (since January 2024), Wellbutrin 75mg BID (3 weeks)   Previous:  per chart review, sertraline 50mg, Abilify 5mg, naltrexone 50mg, mirtazapine 15mg   Self Harm: denies  Suicide Attempts: denies, reports SI around age 13  Access to Firearms: denies    FAMILY PSYCHIATRIC HISTORY  Maternal history with depression and substance use  Paternal history with possible presentation of of bipolar disorder due to periods of erratic behavior  Siblings - 4 younger brothers without any mental health     SOCIAL HISTORY  Patient grew up in [place], [place]. Patient moved to [place] in 2019 with mother and brother; she lives with her brother and pet dog. She works from home as [employer] ([employer]). She identifies as Apieron.     SUBSTANCE USE  Alcohol: history of alcohol use, been to rehab quite a few times in her life (most recent in March-October 2017); abstinent from alcohol since  Tobacco: denies  Marijuana: history of marijuana  Other: cocaine, ecstasy, pain medications    PAST MEDICAL HISTORY  Past Medical History:   Diagnosis Date    Alcohol use disorder, moderate, in early remission (HCC) 11/07/2016    Anxiety     Chronic post-traumatic stress disorder (PTSD) 11/07/2016    Crohn's disease (HCC)     Depression     Hepatitis C     History of alcohol use disorder 11/29/2023    History of anemia 10/10/2016    -Anemia of  chronic disease vs. Anemia secondary to blood loss initially vs other etiology.  -H/H today is 10.5/33.7( yest 10.6/33.3)  -Bedside stool guaic had been positive on admission. Occult blood today negative for blood.  -Will continue to trend. Might need further workup if it continues to trend down.    History of opioid abuse (HCC) 11/29/2023     Allergies   Allergen Reactions    Promethazine Anxiety, Unspecified and Rash    Trazodone Itching    Quetiapine Itching     Past Surgical History:   Procedure Laterality Date    NE COLONOSCOPY-FLEXIBLE N/A 10/26/2023    Procedure: COLONOSCOPY;  Surgeon: Natalia Dorsey M.D.;  Location: SURGERY SAME DAY [facility];  Service: Gastroenterology    NE UPPER GI ENDOSCOPY,DIAGNOSIS N/A 10/26/2023    Procedure: GASTROSCOPY;  Surgeon: Natalia Dorsey M.D.;  Location: SURGERY SAME DAY [facility];  Service: Gastroenterology    NE UPPER GI ENDOSCOPY,BIOPSY N/A 10/26/2023    Procedure: GASTROSCOPY, WITH BIOPSY;  Surgeon: Natalia Dorsey M.D.;  Location: SURGERY SAME DAY [facility];  Service: Gastroenterology    NE COLONOSCOPY,BIOPSY N/A 10/26/2023    Procedure: COLONOSCOPY, WITH BIOPSY;  Surgeon: Natalia Dorsey M.D.;  Location: SURGERY SAME DAY [facility];  Service: Gastroenterology    COLONOSCOPY WITH BIOPSY N/A 6/21/2016    Procedure: COLONOSCOPY WITH BIOPSY;  Surgeon: Jay Leggett M.D.;  Location: ENDOSCOPY [facility];  Service:       Family History   Problem Relation Age of Onset    Thyroid Mother     Alcohol abuse Father     Bipolar disorder Father         father possibly bipolar    No Known Problems Brother     No Known Problems Brother     No Known Problems Brother     Diabetes Maternal Grandmother     Diabetes Paternal Grandmother      Social History     Socioeconomic History    Marital status: Single   Tobacco Use    Smoking status: Never     Passive exposure: Never    Smokeless tobacco: Never   Vaping Use    Vaping status: Never Used   Substance and Sexual Activity    Alcohol  "use: No     Comment: last use 7/17/2016; was daily    Drug use: No    Sexual activity: Not Currently     Past Surgical History:   Procedure Laterality Date    UT COLONOSCOPY-FLEXIBLE N/A 10/26/2023    Procedure: COLONOSCOPY;  Surgeon: Natalia Dorsey M.D.;  Location: SURGERY SAME DAY [facility];  Service: Gastroenterology    UT UPPER GI ENDOSCOPY,DIAGNOSIS N/A 10/26/2023    Procedure: GASTROSCOPY;  Surgeon: Natalia Dorsey M.D.;  Location: SURGERY SAME DAY [facility];  Service: Gastroenterology    UT UPPER GI ENDOSCOPY,BIOPSY N/A 10/26/2023    Procedure: GASTROSCOPY, WITH BIOPSY;  Surgeon: Natalia Dorsey M.D.;  Location: SURGERY SAME DAY [facility];  Service: Gastroenterology    UT COLONOSCOPY,BIOPSY N/A 10/26/2023    Procedure: COLONOSCOPY, WITH BIOPSY;  Surgeon: Natalia Dorsey M.D.;  Location: SURGERY SAME DAY [facility];  Service: Gastroenterology    COLONOSCOPY WITH BIOPSY N/A 6/21/2016    Procedure: COLONOSCOPY WITH BIOPSY;  Surgeon: Jay Leggett M.D.;  Location: ENDOSCOPY [facility];  Service:        PSYCHIATRIC EXAMINATION   There were no vitals taken for this visit.  Musculoskeletal: No abnormal movements noted and wnl  Appearance: well-developed and well-nourished, cooperative and engaged  Thought Process:  linear and non-linear  Abnormal or Psychotic Thoughts: No evidence of auditory or visual hallucinations, and no overt delusions noted  Speech: regular rate, rhythm, volume, tone, and syntax  Mood: \"depressed, anxiety\"  Affect: euthymic, broad range  SI/HI: Denies SI and HI  Orientation: alert and oriented  Recent and Remote Memory: no gross impairment in immediate, recent, or remote memory  Attention Span and Concentration:  Insight/Judgement into symptoms: good  Neurological Testing (MSSE Score and/or clock drawing): MMSE not performed during this encounter     MEDICAL HISTORY:   Crohn's disease since 2006.  Hypertension (amlodipine 10mg daily) for 8 months  Denies history of seizure, TBI/concussions, " "denies history of cardiac arrhythmias, denies thyroid disorders    SCREENINGS:      10/23/2023     9:01 PM 11/29/2023     8:02 AM 2/28/2024     9:00 AM   Depression Screen (PHQ-2/PHQ-9)   PHQ-2 Total Score 0 0    PHQ-2 Total Score   2   PHQ-9 Total Score  0    PHQ-9 Total Score   8            ASSESSMENT  Mi Bay is a 44 y.o. old female who presents today for new psychiatric evaluation for the assessment of depression, anxiety, PTSD, Crohn's disease (diagnosed in 2006), history of alcohol use and cocaine use (in remission since 2017). Patient reports that she has been struggling with feelings of anxiety and depression, which are closely tied to her Crohn's disease flare ups. She is currently experiencing an active flare up and treated with autoimmune medication. Patient has been prescribed duloxetine 60mg daily for mood by PCP (increased in January 2025) and Wellbutrin IR 75mg BID (3 weeks ago, only takes morning dose). She wakes up in pain almost daily, improved slightly since starting duloxetine. Patient endorses inability to focus (getting feedback from work to \"slow down\") and negative self-talk and being hard on herself. She has a strong history of childhood trauma, and has difficulty feeling safe and socializing with others that she does not feel safe around, negative view of self and interpersonal difficulty. Due to her concurrent autoimmune disease and symptoms of depression, anxiety, pain and difficulty sleeping will start amitryptyline 25mg qHS. Will transition her dose of Wellbutrin IR to XL for optimized duration and reduced withdrawal side effects. Patient is actively seeking to establish care with individual psychotherapist in the community (preferably person of color or queer), educated patient on www.[employer] resource. Follow up in 4 weeks.       CURRENT RISK ASSESSMENT       Suicide: Low       Homicide: Low       Self-Harm: Low       Relapse: Low       Crisis Safety Plan Reviewed " Not Indicated    DIAGNOSES/PLAN  Problem List Items Addressed This Visit          Psychiatry Problems    Major depressive disorder, recurrent, severe without psychotic features (HCC)    Relevant Medications    buPROPion (WELLBUTRIN XL) 150 MG XL tablet    amitriptyline (ELAVIL) 25 MG Tab    Chronic post-traumatic stress disorder (PTSD)    Relevant Medications    buPROPion (WELLBUTRIN XL) 150 MG XL tablet    amitriptyline (ELAVIL) 25 MG Tab    BRISA (generalized anxiety disorder)    Relevant Medications    buPROPion (WELLBUTRIN XL) 150 MG XL tablet    amitriptyline (ELAVIL) 25 MG Tab   -Continue duloxetine 60mg daily  -Transition from Wellbutrin IR 75mg BID and start Wellbutrin XL 150mg daily  -Start amitriptyline 25mg qHS      Medication options, alternatives (including no medications) and medication risks/benefits/side effects were discussed in detail.  The patient was advised to call, message clinician on [employer], or come in to the clinic if symptoms worsen or if questions/issues regarding their medications arise.  The patient verbalized understanding and agreement.    The patient was educated to call 911, call the suicide hotline, or go to the local ER if having thoughts of suicide or homicide.  The patient verbalized understanding and agreement.   The proposed treatment plan was discussed with the patient who was provided the opportunity to ask questions and make suggestions regarding alternative treatment. Patient verbalized understanding and expressed agreement with the plan.      Follow up in 4 weeks      This appointment was supervised by attending psychiatrist, Melo Stout MD who agrees with assessment and treatment plan.  See attending attestation for more details.

## 2025-03-06 ENCOUNTER — HOSPITAL ENCOUNTER (OUTPATIENT)
Dept: LAB | Facility: MEDICAL CENTER | Age: 45
End: 2025-03-06
Attending: NURSE PRACTITIONER
Payer: COMMERCIAL

## 2025-03-06 PROCEDURE — 36415 COLL VENOUS BLD VENIPUNCTURE: CPT

## 2025-03-06 PROCEDURE — 84433 ASY THIOPURIN S-MTHYLTRNSFRS: CPT

## 2025-03-10 LAB — TPMT BLD-CCNC: 24.3 U/ML (ref 24–44)

## 2025-04-02 ENCOUNTER — APPOINTMENT (OUTPATIENT)
Dept: BEHAVIORAL HEALTH | Facility: CLINIC | Age: 45
End: 2025-04-02
Payer: COMMERCIAL

## 2025-04-09 ENCOUNTER — OFFICE VISIT (OUTPATIENT)
Dept: BEHAVIORAL HEALTH | Facility: CLINIC | Age: 45
End: 2025-04-09
Payer: COMMERCIAL

## 2025-04-09 DIAGNOSIS — F33.2 MAJOR DEPRESSIVE DISORDER, RECURRENT, SEVERE WITHOUT PSYCHOTIC FEATURES (HCC): ICD-10-CM

## 2025-04-09 DIAGNOSIS — F43.12 CHRONIC POST-TRAUMATIC STRESS DISORDER (PTSD): ICD-10-CM

## 2025-04-09 DIAGNOSIS — F41.1 GENERALIZED ANXIETY DISORDER: ICD-10-CM

## 2025-04-09 DIAGNOSIS — F41.1 GAD (GENERALIZED ANXIETY DISORDER): ICD-10-CM

## 2025-04-09 DIAGNOSIS — F33.1 MODERATE EPISODE OF RECURRENT MAJOR DEPRESSIVE DISORDER (HCC): ICD-10-CM

## 2025-04-09 PROCEDURE — 99214 OFFICE O/P EST MOD 30 MIN: CPT | Mod: GC

## 2025-04-09 RX ORDER — AMITRIPTYLINE HYDROCHLORIDE 50 MG/1
50 TABLET ORAL NIGHTLY
Qty: 90 TABLET | Refills: 0 | Status: SHIPPED | OUTPATIENT
Start: 2025-04-09 | End: 2025-07-08

## 2025-04-09 RX ORDER — DULOXETIN HYDROCHLORIDE 60 MG/1
60 CAPSULE, DELAYED RELEASE ORAL DAILY
Qty: 90 CAPSULE | Refills: 2 | Status: SHIPPED | OUTPATIENT
Start: 2025-04-09

## 2025-04-09 ASSESSMENT — PATIENT HEALTH QUESTIONNAIRE - PHQ9
CLINICAL INTERPRETATION OF PHQ2 SCORE: 1
SUM OF ALL RESPONSES TO PHQ QUESTIONS 1-9: 7
5. POOR APPETITE OR OVEREATING: 1 - SEVERAL DAYS

## 2025-04-09 NOTE — PROGRESS NOTES
"Psychiatric Follow Up Note  Evaluation completed by: Tisha Kendall M.D.   Date of Service: 04/09/2025  Appointment type: in-office appointment.  Attending:  Melo Stout MD  Information below was collected from: patient    CHIEF COMPLIANT:  Crohn's disease and depression, anxiety, PTSD    HPI:   Mi Bay is a 44 y.o. old female who presents today for regularly scheduled follow up for assessment of depression, anxiety, PTSD. Patient last seen on 2/25 when plan was to continue duloxetine 60mg daily, increase Wellbutrin XL to 150mg daily, and start amitriptyline. Patient reports since last seen she felt that her mood has \"stabilized\" and has had noticeable benefit from medications, including improved mood, motivation to complete tasks and sleep. She denies her mood being excessively elated and denies any neela symptoms. She also denies any periods of excessive depression and anxiety, reports few days of depression around 1-2 and some moments of negative self-talk that she is working to be more compassionate to herself. Patient reports that she has felt increasingly motivated to complete tasks on her to do list and has been slightly more productive. She noticed benefit of improved sleep for a few days with less difficulty with staying asleep when she first initiated amitriptyline, but effect has since plateau. Discussed increasing amitriptyline dose to reach therapeutic dose for sleep and mood. Patient continues to look at therapy resources online, specifically individuals who specialize in holistic practices, but has not decided on which provider yet because she has not decided if she wants to do face-to-face or online. Denies any SI/HI.     PSYCHIATRIC REVIEW OF SYSTEMS:current symptoms as reported by pt.  Depression: Difficulty sleeping  Neela: Patient denies any change in mood, increased energy, or marked irritability  Anxiety/Panic Attacks: racing thoughts, difficulty concentrating  Trauma: negative " beliefs of self/others/world and feeling detached from others  Psychosis: Patient reports no signs or symptoms indicative of psychosis    CURRENT MEDICATIONS    Current Outpatient Medications:     DULoxetine (CYMBALTA) 60 MG Cap DR Particles delayed-release capsule, Take 1 Capsule by mouth every day., Disp: 90 Capsule, Rfl: 2    amitriptyline (ELAVIL) 50 MG Tab, Take 1 Tablet by mouth every evening for 90 days., Disp: 90 Tablet, Rfl: 0    buPROPion (WELLBUTRIN XL) 150 MG XL tablet, Take 1 Tablet by mouth every morning for 90 days., Disp: 90 Tablet, Rfl: 0    Upadacitinib ER (RINVOQ) 45 MG TABLET SR 24 HR, 90 Tablets., Disp: , Rfl:     amLODIPine (NORVASC) 10 MG Tab, Take 1 Tablet by mouth every day., Disp: 90 Tablet, Rfl: 3    ascorbic acid (ASCORBIC ACID) 500 MG Tab, Take 1 Tablet by mouth every day., Disp: 30 Tablet, Rfl: 0    vitamin D3 (CHOLECALCIFEROL) 1000 Unit (25 mcg) Tab, Take 1,000 Units by mouth every day., Disp: , Rfl:     cyanocobalamin (VITAMIN B-12) 500 MCG Tab, Take 500 mcg by mouth every day., Disp: , Rfl:     therapeutic multivitamin-minerals (THERAGRAN-M) Tab, Take 1 Tablet by mouth every day., Disp: , Rfl:     Zinc 50 MG Tab, Take 50 mg by mouth every day., Disp: , Rfl:     magnesium oxide (MAG-OX) 400 MG Tab tablet, Take 400 mg by mouth every day., Disp: , Rfl:     Probiotic Product (PROBIOTIC DAILY) Cap, Take 1 Capsule by mouth every day., Disp: , Rfl:      REVIEW OF SYSTEMS   ROS  Neurologic: no tics, tremors, dyskinesias. The patient denies dizzniess, syncope, falls. Ambulates independently    PAST MEDICAL HISTORY  Past Medical History:   Diagnosis Date    Alcohol use disorder, moderate, in early remission (HCC) 11/07/2016    Anxiety     Chronic post-traumatic stress disorder (PTSD) 11/07/2016    Crohn's disease (HCC)     Depression     Hepatitis C     History of alcohol use disorder 11/29/2023    History of anemia 10/10/2016    -Anemia of chronic disease vs. Anemia secondary to blood loss  initially vs other etiology.  -H/H today is 10.5/33.7( yest 10.6/33.3)  -Bedside stool guaic had been positive on admission. Occult blood today negative for blood.  -Will continue to trend. Might need further workup if it continues to trend down.    History of opioid abuse (HCC) 11/29/2023     Allergies   Allergen Reactions    Promethazine Anxiety, Unspecified and Rash    Trazodone Itching    Quetiapine Itching     Past Surgical History:   Procedure Laterality Date    OH COLONOSCOPY-FLEXIBLE N/A 10/26/2023    Procedure: COLONOSCOPY;  Surgeon: Natalia Dorsey M.D.;  Location: SURGERY SAME DAY [facility];  Service: Gastroenterology    OH UPPER GI ENDOSCOPY,DIAGNOSIS N/A 10/26/2023    Procedure: GASTROSCOPY;  Surgeon: Natalia Dorsey M.D.;  Location: SURGERY SAME DAY [facility];  Service: Gastroenterology    OH UPPER GI ENDOSCOPY,BIOPSY N/A 10/26/2023    Procedure: GASTROSCOPY, WITH BIOPSY;  Surgeon: Natalia Dorsey M.D.;  Location: SURGERY SAME DAY [facility];  Service: Gastroenterology    OH COLONOSCOPY,BIOPSY N/A 10/26/2023    Procedure: COLONOSCOPY, WITH BIOPSY;  Surgeon: Natalia Dorsey M.D.;  Location: SURGERY SAME DAY [facility];  Service: Gastroenterology    COLONOSCOPY WITH BIOPSY N/A 6/21/2016    Procedure: COLONOSCOPY WITH BIOPSY;  Surgeon: Jay Leggett M.D.;  Location: ENDOSCOPY [facility];  Service:       Family History   Problem Relation Age of Onset    Thyroid Mother     Alcohol abuse Father     Bipolar disorder Father         father possibly bipolar    No Known Problems Brother     No Known Problems Brother     No Known Problems Brother     Diabetes Maternal Grandmother     Diabetes Paternal Grandmother      Social History     Socioeconomic History    Marital status: Single   Tobacco Use    Smoking status: Never     Passive exposure: Never    Smokeless tobacco: Never   Vaping Use    Vaping status: Never Used   Substance and Sexual Activity    Alcohol use: No     Comment: last use 7/17/2016; was daily     "Drug use: No    Sexual activity: Not Currently     Past Surgical History:   Procedure Laterality Date    MD COLONOSCOPY-FLEXIBLE N/A 10/26/2023    Procedure: COLONOSCOPY;  Surgeon: Natalia Dorsey M.D.;  Location: SURGERY SAME DAY [facility];  Service: Gastroenterology    MD UPPER GI ENDOSCOPY,DIAGNOSIS N/A 10/26/2023    Procedure: GASTROSCOPY;  Surgeon: Natalia Dorsey M.D.;  Location: SURGERY SAME DAY [facility];  Service: Gastroenterology    MD UPPER GI ENDOSCOPY,BIOPSY N/A 10/26/2023    Procedure: GASTROSCOPY, WITH BIOPSY;  Surgeon: Natalia Dorsey M.D.;  Location: SURGERY SAME DAY [facility];  Service: Gastroenterology    MD COLONOSCOPY,BIOPSY N/A 10/26/2023    Procedure: COLONOSCOPY, WITH BIOPSY;  Surgeon: Natalia Dorsey M.D.;  Location: SURGERY SAME DAY [facility];  Service: Gastroenterology    COLONOSCOPY WITH BIOPSY N/A 6/21/2016    Procedure: COLONOSCOPY WITH BIOPSY;  Surgeon: Jay Leggett M.D.;  Location: ENDOSCOPY [facility];  Service:        PSYCHIATRIC EXAMINATION   There were no vitals taken for this visit.  Musculoskeletal: No abnormal movements noted and wnl  Appearance: well-developed and well-nourished, cooperative and engaged  Thought Process:  linear and non-linear  Abnormal or Psychotic Thoughts: No evidence of auditory or visual hallucinations, and no overt delusions noted  Speech: regular rate, rhythm, volume, tone, and syntax  Mood: \"good\"  Affect: euthymic  SI/HI: Denies SI and HI  Orientation: alert and oriented  Recent and Remote Memory: no gross impairment in immediate, recent, or remote memory  Attention Span and Concentration: intact  Insight/Judgement into symptoms: good  Neurological Testing (MSSE Score and/or clock drawing): MMSE not performed during this encounter     SCREENINGS:      11/29/2023     8:02 AM 2/28/2024     9:00 AM 4/9/2025     8:30 AM   Depression Screen (PHQ-2/PHQ-9)   PHQ-2 Total Score 0     PHQ-2 Total Score  2 1   PHQ-9 Total Score 0     PHQ-9 Total Score  8 7 "          CURRENT RISK ASSESSMENT       Suicide: Low       Homicide: Low       Self-Harm: Low       Relapse: Low       Crisis Safety Plan Reviewed Not Indicated    ASSESSMENT/DIAGNOSES/PLAN  Mi Bay is a 44F with history of Chrohn's disease (since 2006), depression, anxiety, PTSD. Patient last seen on 2/25 when plan was to continue duloxetine 60mg daily, transition formulation and increase dose of Wellbutrin to XL 150mg daily, and start amitriptyline 25mg qHS for mood and sleep. Patient reports that mood, motivation and sleep have all improved. Patient has not had any episodes of severe anxiety, depression, or negative self-talk. She is able to enjoy engaging in activities and her motivation has improved. She reports that she still has some days or moments of depression, approximately 1-2 days in the past 2 weeks. She had noticeable improvement in sleep when first initiating amitriptyline, discussed trial of increased dose to 50mg qHS to manage sleep and mood appropriately and discussed risks and benefits of anticholinergic effects of medication at higher does to which patient expressed understanding. Will follow up in 8 weeks and discussed transition of care to new resident after July.   Problem List Items Addressed This Visit          Psychiatry Problems    Major depressive disorder, recurrent, severe without psychotic features (HCC)    Relevant Medications    DULoxetine (CYMBALTA) 60 MG Cap DR Particles delayed-release capsule    amitriptyline (ELAVIL) 50 MG Tab    Chronic post-traumatic stress disorder (PTSD)    Relevant Medications    DULoxetine (CYMBALTA) 60 MG Cap DR Particles delayed-release capsule    amitriptyline (ELAVIL) 50 MG Tab    BRISA (generalized anxiety disorder)    Relevant Medications    DULoxetine (CYMBALTA) 60 MG Cap DR Particles delayed-release capsule    amitriptyline (ELAVIL) 50 MG Tab     Other Visit Diagnoses         Moderate episode of recurrent major depressive disorder (HCC)         Relevant Medications    DULoxetine (CYMBALTA) 60 MG Cap DR Particles delayed-release capsule    amitriptyline (ELAVIL) 50 MG Tab      BRISA (generalized anxiety disorder)        Relevant Medications    DULoxetine (CYMBALTA) 60 MG Cap DR Particles delayed-release capsule    amitriptyline (ELAVIL) 50 MG Tab         -Continue duloxetine 60mg daily  -Continue Wellbutrin XL 150mg daily  -Increase amitriptyline from 25mg qHS to 50mg qHS     Medication options, alternatives (including no medications) and medication risks/benefits/side effects were discussed in detail.  The patient was advised to call, message clinician on [facility], or come in to the clinic if symptoms worsen or if questions/issues regarding their medications arise.  The patient verbalized understanding and agreement.    The patient was educated to call 911, call the suicide hotline, or go to the local ER if having thoughts of suicide or homicide.  The patient verbalized understanding and agreement.   The proposed treatment plan was discussed with the patient who was provided the opportunity to ask questions and make suggestions regarding alternative treatment. Patient verbalized understanding and expressed agreement with the plan.      Follow up in 8 weeks    This appointment was supervised by attending psychiatrist, Melo Stout MD who agrees with assessment and treatment plan.  See attending attestation for more details.

## 2025-04-14 ENCOUNTER — HOSPITAL ENCOUNTER (OUTPATIENT)
Dept: LAB | Facility: MEDICAL CENTER | Age: 45
End: 2025-04-14
Attending: NURSE PRACTITIONER
Payer: COMMERCIAL

## 2025-04-14 LAB
BASOPHILS # BLD AUTO: 0.5 % (ref 0–1.8)
BASOPHILS # BLD: 0.03 K/UL (ref 0–0.12)
EOSINOPHIL # BLD AUTO: 0.03 K/UL (ref 0–0.51)
EOSINOPHIL NFR BLD: 0.5 % (ref 0–6.9)
ERYTHROCYTE [DISTWIDTH] IN BLOOD BY AUTOMATED COUNT: 45.9 FL (ref 35.9–50)
HCT VFR BLD AUTO: 35.3 % (ref 37–47)
HGB BLD-MCNC: 11.8 G/DL (ref 12–16)
IMM GRANULOCYTES # BLD AUTO: 0.01 K/UL (ref 0–0.11)
IMM GRANULOCYTES NFR BLD AUTO: 0.2 % (ref 0–0.9)
LYMPHOCYTES # BLD AUTO: 1.57 K/UL (ref 1–4.8)
LYMPHOCYTES NFR BLD: 26.8 % (ref 22–41)
MCH RBC QN AUTO: 31.6 PG (ref 27–33)
MCHC RBC AUTO-ENTMCNC: 33.4 G/DL (ref 32.2–35.5)
MCV RBC AUTO: 94.4 FL (ref 81.4–97.8)
MONOCYTES # BLD AUTO: 0.39 K/UL (ref 0–0.85)
MONOCYTES NFR BLD AUTO: 6.7 % (ref 0–13.4)
NEUTROPHILS # BLD AUTO: 3.83 K/UL (ref 1.82–7.42)
NEUTROPHILS NFR BLD: 65.3 % (ref 44–72)
NRBC # BLD AUTO: 0 K/UL
NRBC BLD-RTO: 0 /100 WBC (ref 0–0.2)
PLATELET # BLD AUTO: 633 K/UL (ref 164–446)
PMV BLD AUTO: 9.4 FL (ref 9–12.9)
RBC # BLD AUTO: 3.74 M/UL (ref 4.2–5.4)
WBC # BLD AUTO: 5.9 K/UL (ref 4.8–10.8)

## 2025-04-14 PROCEDURE — 80053 COMPREHEN METABOLIC PANEL: CPT

## 2025-04-14 PROCEDURE — 85025 COMPLETE CBC W/AUTO DIFF WBC: CPT

## 2025-04-14 PROCEDURE — 36415 COLL VENOUS BLD VENIPUNCTURE: CPT

## 2025-04-15 LAB
ALBUMIN SERPL BCP-MCNC: 4.4 G/DL (ref 3.2–4.9)
ALBUMIN/GLOB SERPL: 1.1 G/DL
ALP SERPL-CCNC: 59 U/L (ref 30–99)
ALT SERPL-CCNC: 24 U/L (ref 2–50)
ANION GAP SERPL CALC-SCNC: 13 MMOL/L (ref 7–16)
AST SERPL-CCNC: 27 U/L (ref 12–45)
BILIRUB SERPL-MCNC: 0.2 MG/DL (ref 0.1–1.5)
BUN SERPL-MCNC: 14 MG/DL (ref 8–22)
CALCIUM ALBUM COR SERPL-MCNC: 9.8 MG/DL (ref 8.5–10.5)
CALCIUM SERPL-MCNC: 10.1 MG/DL (ref 8.5–10.5)
CHLORIDE SERPL-SCNC: 104 MMOL/L (ref 96–112)
CO2 SERPL-SCNC: 21 MMOL/L (ref 20–33)
CREAT SERPL-MCNC: 0.96 MG/DL (ref 0.5–1.4)
GFR SERPLBLD CREATININE-BSD FMLA CKD-EPI: 74 ML/MIN/1.73 M 2
GLOBULIN SER CALC-MCNC: 4 G/DL (ref 1.9–3.5)
GLUCOSE SERPL-MCNC: 91 MG/DL (ref 65–99)
POTASSIUM SERPL-SCNC: 4.3 MMOL/L (ref 3.6–5.5)
PROT SERPL-MCNC: 8.4 G/DL (ref 6–8.2)
SODIUM SERPL-SCNC: 138 MMOL/L (ref 135–145)

## 2025-05-23 RX ORDER — BUPROPION HYDROCHLORIDE 150 MG/1
150 TABLET ORAL EVERY MORNING
Qty: 90 TABLET | Refills: 0 | Status: SHIPPED | OUTPATIENT
Start: 2025-05-23

## (undated) DEVICE — FORCEP RADIAL JAW 4 STANDARD CAPACITY W/NEEDLE 240CM (40EA/BX)

## (undated) DEVICE — TOWEL STOP TIMEOUT SAFETY FLAG (40EA/CA)

## (undated) DEVICE — CIRCUIT VENTILATOR PEDIATRIC WITH FILTER  (20EA/CS)

## (undated) DEVICE — WATER IRRIGATION STERILE 1000ML (12EA/CA)

## (undated) DEVICE — BLOCK BITE MAXI DENTAL RETENTION RIM (100EA/BX)

## (undated) DEVICE — CONTAINER, SPECIMEN, STERILE

## (undated) DEVICE — CANISTER SUCTION RIGID RED 1500CC (40EA/CA)

## (undated) DEVICE — SODIUM CHL IRRIGATION 0.9% 1000ML (12EA/CA)

## (undated) DEVICE — ELECTRODE 850 FOAM ADHESIVE - HYDROGEL RADIOTRNSPRNT (50/PK)

## (undated) DEVICE — TUBING CLEARLINK DUO-VENT - C-FLO (48EA/CA)

## (undated) DEVICE — PORT AUXILLARY WATER (50EA/BX)

## (undated) DEVICE — MASK PANORAMIC OXYGEN PRO2 (30EA/CA)

## (undated) DEVICE — SET LEADWIRE 5 LEAD BEDSIDE DISPOSABLE ECG (1SET OF 5/EA)

## (undated) DEVICE — MANIFOLD NEPTUNE 1 PORT (20/PK)

## (undated) DEVICE — TUBE CONNECTING SUCTION - CLEAR PLASTIC STERILE 72 IN (50EA/CA)

## (undated) DEVICE — DEFENDO SUCTION VALVE

## (undated) DEVICE — KIT PROCEDURE DOUBLE ENDO ONLY (5/CA)

## (undated) DEVICE — LACTATED RINGERS INJ 1000 ML - (14EA/CA 60CA/PF)

## (undated) DEVICE — MASK AIRWAY FLEXIBLE SINGLE-USE SIZE 4 ADULTS (10EA/BX)

## (undated) DEVICE — FILM CASSETTE ENDO

## (undated) DEVICE — SENSOR OXIMETER ADULT SPO2 RD SET (20EA/BX)